# Patient Record
Sex: MALE | Race: BLACK OR AFRICAN AMERICAN | NOT HISPANIC OR LATINO | Employment: OTHER | URBAN - METROPOLITAN AREA
[De-identification: names, ages, dates, MRNs, and addresses within clinical notes are randomized per-mention and may not be internally consistent; named-entity substitution may affect disease eponyms.]

---

## 2022-01-21 ENCOUNTER — TELEPHONE (OUTPATIENT)
Dept: UROLOGY | Facility: CLINIC | Age: 67
End: 2022-01-21
Payer: COMMERCIAL

## 2022-01-21 NOTE — TELEPHONE ENCOUNTER
Called and spoke to patient. States that he was seen Dr. Arjun Gruber. States they are seeking a second opinion for a urolift consult. States that they will fax over records they have. Will also request additional records.

## 2022-01-21 NOTE — TELEPHONE ENCOUNTER
"Booked in by phone room on 2/4 for "urolift follow up"  Please call patient to clarify  Is this a consult to discuss urolift? Or has he had one elsewhere and following up now here?  Seen urology before? Get records  As well, and prior psa? Request all prior and or from prior pcp before seeing dr york for cdl    "

## 2022-02-01 NOTE — TELEPHONE ENCOUNTER
Please apologize as not avail 2/4  Will need to rebook  2/21 pvt slot ok  Can confirm we have his prior uro records

## 2022-02-02 NOTE — TELEPHONE ENCOUNTER
Called and spoke to patient. Advised that MD is not available on 2/4 and appt will need to be rescheduled. Appt scheduled 2/21 for 9:30am. Patient confirmed.

## 2022-03-10 ENCOUNTER — OFFICE VISIT (OUTPATIENT)
Dept: UROLOGY | Facility: CLINIC | Age: 67
End: 2022-03-10
Payer: COMMERCIAL

## 2022-03-10 VITALS
HEART RATE: 63 BPM | SYSTOLIC BLOOD PRESSURE: 117 MMHG | DIASTOLIC BLOOD PRESSURE: 77 MMHG | BODY MASS INDEX: 26.35 KG/M2 | WEIGHT: 184.06 LBS | HEIGHT: 70 IN

## 2022-03-10 DIAGNOSIS — R39.9 LOWER URINARY TRACT SYMPTOMS (LUTS): Primary | ICD-10-CM

## 2022-03-10 DIAGNOSIS — R35.0 URINARY FREQUENCY: ICD-10-CM

## 2022-03-10 DIAGNOSIS — N52.9 ERECTILE DYSFUNCTION, UNSPECIFIED ERECTILE DYSFUNCTION TYPE: ICD-10-CM

## 2022-03-10 LAB — POC RESIDUAL URINE VOLUME: 76 ML (ref 0–100)

## 2022-03-10 PROCEDURE — 99204 PR OFFICE/OUTPT VISIT, NEW, LEVL IV, 45-59 MIN: ICD-10-PCS | Mod: S$GLB,,, | Performed by: UROLOGY

## 2022-03-10 PROCEDURE — 51798 US URINE CAPACITY MEASURE: CPT | Mod: S$GLB,,, | Performed by: UROLOGY

## 2022-03-10 PROCEDURE — 3008F PR BODY MASS INDEX (BMI) DOCUMENTED: ICD-10-PCS | Mod: CPTII,S$GLB,, | Performed by: UROLOGY

## 2022-03-10 PROCEDURE — 1126F PR PAIN SEVERITY QUANTIFIED, NO PAIN PRESENT: ICD-10-PCS | Mod: CPTII,S$GLB,, | Performed by: UROLOGY

## 2022-03-10 PROCEDURE — 1101F PT FALLS ASSESS-DOCD LE1/YR: CPT | Mod: CPTII,S$GLB,, | Performed by: UROLOGY

## 2022-03-10 PROCEDURE — 3078F DIAST BP <80 MM HG: CPT | Mod: CPTII,S$GLB,, | Performed by: UROLOGY

## 2022-03-10 PROCEDURE — 99999 PR PBB SHADOW E&M-EST. PATIENT-LVL III: ICD-10-PCS | Mod: PBBFAC,,, | Performed by: UROLOGY

## 2022-03-10 PROCEDURE — 3008F BODY MASS INDEX DOCD: CPT | Mod: CPTII,S$GLB,, | Performed by: UROLOGY

## 2022-03-10 PROCEDURE — 1126F AMNT PAIN NOTED NONE PRSNT: CPT | Mod: CPTII,S$GLB,, | Performed by: UROLOGY

## 2022-03-10 PROCEDURE — 3078F PR MOST RECENT DIASTOLIC BLOOD PRESSURE < 80 MM HG: ICD-10-PCS | Mod: CPTII,S$GLB,, | Performed by: UROLOGY

## 2022-03-10 PROCEDURE — 1159F PR MEDICATION LIST DOCUMENTED IN MEDICAL RECORD: ICD-10-PCS | Mod: CPTII,S$GLB,, | Performed by: UROLOGY

## 2022-03-10 PROCEDURE — 3074F SYST BP LT 130 MM HG: CPT | Mod: CPTII,S$GLB,, | Performed by: UROLOGY

## 2022-03-10 PROCEDURE — 1160F PR REVIEW ALL MEDS BY PRESCRIBER/CLIN PHARMACIST DOCUMENTED: ICD-10-PCS | Mod: CPTII,S$GLB,, | Performed by: UROLOGY

## 2022-03-10 PROCEDURE — 3074F PR MOST RECENT SYSTOLIC BLOOD PRESSURE < 130 MM HG: ICD-10-PCS | Mod: CPTII,S$GLB,, | Performed by: UROLOGY

## 2022-03-10 PROCEDURE — 3288F PR FALLS RISK ASSESSMENT DOCUMENTED: ICD-10-PCS | Mod: CPTII,S$GLB,, | Performed by: UROLOGY

## 2022-03-10 PROCEDURE — 1101F PR PT FALLS ASSESS DOC 0-1 FALLS W/OUT INJ PAST YR: ICD-10-PCS | Mod: CPTII,S$GLB,, | Performed by: UROLOGY

## 2022-03-10 PROCEDURE — 99999 PR PBB SHADOW E&M-EST. PATIENT-LVL III: CPT | Mod: PBBFAC,,, | Performed by: UROLOGY

## 2022-03-10 PROCEDURE — 51798 POCT BLADDER SCAN: ICD-10-PCS | Mod: S$GLB,,, | Performed by: UROLOGY

## 2022-03-10 PROCEDURE — 3288F FALL RISK ASSESSMENT DOCD: CPT | Mod: CPTII,S$GLB,, | Performed by: UROLOGY

## 2022-03-10 PROCEDURE — 1160F RVW MEDS BY RX/DR IN RCRD: CPT | Mod: CPTII,S$GLB,, | Performed by: UROLOGY

## 2022-03-10 PROCEDURE — 99204 OFFICE O/P NEW MOD 45 MIN: CPT | Mod: S$GLB,,, | Performed by: UROLOGY

## 2022-03-10 PROCEDURE — 1159F MED LIST DOCD IN RCRD: CPT | Mod: CPTII,S$GLB,, | Performed by: UROLOGY

## 2022-03-10 RX ORDER — ACETAMINOPHEN 500 MG
1 TABLET ORAL DAILY
COMMUNITY
Start: 2022-01-01 | End: 2023-10-31

## 2022-03-10 RX ORDER — IBUPROFEN 100 MG/5ML
1 SUSPENSION, ORAL (FINAL DOSE FORM) ORAL DAILY
COMMUNITY
Start: 2022-01-01 | End: 2023-10-31

## 2022-03-10 RX ORDER — TAMSULOSIN HYDROCHLORIDE 0.4 MG/1
0.4 CAPSULE ORAL
COMMUNITY
Start: 2021-12-06 | End: 2022-06-03

## 2022-03-10 NOTE — PROGRESS NOTES
"[facility] New Patient/H&P:    Jos Espino is a 66 y.o. male who presents for lower urinary tract symptoms.    Patient with a history of HLD who has a several year history of bothersome lower urinary tract symptoms that have been progressively worsening.     He reports weak stream, frequency, incomplete emptying, intermittency, straining and nocturia x 2. He has been on Flomax for numerous years with no significant improvement. Drinks mostly water throughout the day.     He underwent attempted Urolift of an enlarged median lobe with Dr. Gruber in [place] on 1/14/22. Per the op note, he was able to place one implant in the median lobe that did not appear to be placed well. Procedure aborted. Here reporting that his symptoms have persisted and he is bothered.     UA negative on 1/20/22.     He also has moderate erectile dysfunction. He tried Viagra, but discontinued as his wife was not satisfied. Not bothered by his erectile function.     Two brothers with prostate cancer. Works as a .     Denies any fever, chills, flank pain, gross hematuria, bone pain, unintentional weight loss      PSA  1.03  12/6/21    IPSS QoL  17 4 3/10/22    PVR  76 mL  3/10/22      Past Medical History:   Diagnosis Date    Hyperlipidemia        History reviewed. No pertinent surgical history.    Family History   Problem Relation Age of Onset    Hypertension Brother     Cancer Brother         prostate       Review of patient's allergies indicates:  No Known Allergies    Medications Reviewed: see MAR      FOCUSED PHYSICAL EXAM:    Vitals:    03/10/22 0931   BP: 117/77   Pulse: 63     Body mass index is 26.41 kg/m². Weight: 83.5 kg (184 lb 1.4 oz) Height: 5' 10" (177.8 cm)       General: Alert, cooperative, no distress, appears stated age  Abdomen: Soft, non-tender, no CVA tenderness, non-distended      LABS:    Recent Results (from the past 336 hour(s))   POCT Bladder Scan    Collection Time: 03/10/22  " 9:40 AM   Result Value Ref Range    POC Residual Urine Volume 76 0 - 100 mL           Assessment/Diagnosis:    1. Lower urinary tract symptoms (LUTS)     2. Urinary frequency  POCT Bladder Scan   3. Erectile dysfunction, unspecified erectile dysfunction type         Plans:    - I spent 45 minutes of the day of this encounter preparing for, treating and managing this patient. Extensive discussion with patient regarding the etiology and management of his lower urinary tract symptoms. Explained that LUTS are multifactorial and can be secondary to an enlarged prostate, PO intake of bladder irritants, overactive bladder, constipation, malignancy, trauma, infection, stones or medications.   - Unclear based on records, if the implant is still in place. Given his symptoms, recommend further evaluation with cystoscopy and TRUS in clinic next available.   - Would likely benefit from TURP based on findings.

## 2022-03-10 NOTE — H&P (VIEW-ONLY)
"[facility] New Patient/H&P:    Jos Espino is a 66 y.o. male who presents for lower urinary tract symptoms.    Patient with a history of HLD who has a several year history of bothersome lower urinary tract symptoms that have been progressively worsening.     He reports weak stream, frequency, incomplete emptying, intermittency, straining and nocturia x 2. He has been on Flomax for numerous years with no significant improvement. Drinks mostly water throughout the day.     He underwent attempted Urolift of an enlarged median lobe with Dr. Gruber in [place] on 1/14/22. Per the op note, he was able to place one implant in the median lobe that did not appear to be placed well. Procedure aborted. Here reporting that his symptoms have persisted and he is bothered.     UA negative on 1/20/22.     He also has moderate erectile dysfunction. He tried Viagra, but discontinued as his wife was not satisfied. Not bothered by his erectile function.     Two brothers with prostate cancer. Works as a .     Denies any fever, chills, flank pain, gross hematuria, bone pain, unintentional weight loss      PSA  1.03  12/6/21    IPSS QoL  17 4 3/10/22    PVR  76 mL  3/10/22      Past Medical History:   Diagnosis Date    Hyperlipidemia        History reviewed. No pertinent surgical history.    Family History   Problem Relation Age of Onset    Hypertension Brother     Cancer Brother         prostate       Review of patient's allergies indicates:  No Known Allergies    Medications Reviewed: see MAR      FOCUSED PHYSICAL EXAM:    Vitals:    03/10/22 0931   BP: 117/77   Pulse: 63     Body mass index is 26.41 kg/m². Weight: 83.5 kg (184 lb 1.4 oz) Height: 5' 10" (177.8 cm)       General: Alert, cooperative, no distress, appears stated age  Abdomen: Soft, non-tender, no CVA tenderness, non-distended      LABS:    Recent Results (from the past 336 hour(s))   POCT Bladder Scan    Collection Time: 03/10/22  " 9:40 AM   Result Value Ref Range    POC Residual Urine Volume 76 0 - 100 mL           Assessment/Diagnosis:    1. Lower urinary tract symptoms (LUTS)     2. Urinary frequency  POCT Bladder Scan   3. Erectile dysfunction, unspecified erectile dysfunction type         Plans:    - I spent 45 minutes of the day of this encounter preparing for, treating and managing this patient. Extensive discussion with patient regarding the etiology and management of his lower urinary tract symptoms. Explained that LUTS are multifactorial and can be secondary to an enlarged prostate, PO intake of bladder irritants, overactive bladder, constipation, malignancy, trauma, infection, stones or medications.   - Unclear based on records, if the implant is still in place. Given his symptoms, recommend further evaluation with cystoscopy and TRUS in clinic next available.   - Would likely benefit from TURP based on findings.

## 2022-03-18 ENCOUNTER — PROCEDURE VISIT (OUTPATIENT)
Dept: UROLOGY | Facility: CLINIC | Age: 67
End: 2022-03-18
Payer: COMMERCIAL

## 2022-03-18 VITALS
HEART RATE: 90 BPM | WEIGHT: 184.06 LBS | DIASTOLIC BLOOD PRESSURE: 80 MMHG | HEIGHT: 70 IN | SYSTOLIC BLOOD PRESSURE: 135 MMHG | BODY MASS INDEX: 26.35 KG/M2

## 2022-03-18 DIAGNOSIS — R35.0 URINARY FREQUENCY: ICD-10-CM

## 2022-03-18 DIAGNOSIS — R39.12 BENIGN PROSTATIC HYPERPLASIA WITH WEAK URINARY STREAM: ICD-10-CM

## 2022-03-18 DIAGNOSIS — N40.1 BENIGN PROSTATIC HYPERPLASIA WITH WEAK URINARY STREAM: ICD-10-CM

## 2022-03-18 DIAGNOSIS — R39.9 LOWER URINARY TRACT SYMPTOMS (LUTS): Primary | ICD-10-CM

## 2022-03-18 PROCEDURE — 52000 PR CYSTOURETHROSCOPY: ICD-10-PCS | Mod: S$GLB,,, | Performed by: UROLOGY

## 2022-03-18 PROCEDURE — 76872 PR US TRANSRECTAL: ICD-10-PCS | Mod: 26,S$GLB,, | Performed by: UROLOGY

## 2022-03-18 PROCEDURE — 52000 CYSTOURETHROSCOPY: CPT | Mod: S$GLB,,, | Performed by: UROLOGY

## 2022-03-18 PROCEDURE — 76872 US TRANSRECTAL: CPT | Mod: 26,S$GLB,, | Performed by: UROLOGY

## 2022-03-18 PROCEDURE — 87086 URINE CULTURE/COLONY COUNT: CPT | Performed by: UROLOGY

## 2022-03-18 RX ORDER — CEPHALEXIN 500 MG/1
500 CAPSULE ORAL EVERY 8 HOURS
Qty: 9 CAPSULE | Refills: 0 | Status: SHIPPED | OUTPATIENT
Start: 2022-03-18 | End: 2022-03-21

## 2022-03-18 NOTE — PROCEDURES
[facility]  Operative/Discharge Note    Date: 03/18/2022    Pre-Op Diagnosis: BPH    Post-Op Diagnosis: Same    Procedure(s) Performed:   1.  Cystoscopy  2.  Transrectal ultrasound     Specimen(s): Urine culture    Staff Surgeon: Ricky Rousseau MD    Assistant Surgeon: Ricky Rousseau Jr, MD    Anesthesia: Local anesthesia topical 2% lidocaine gel    Indications: Jos Espino is a 66 y.o. male with BPH s/p unsuccessful Urolift.     Findings: 32 cc prostate with an enlarged median lobe and mild coaptation of the lateral lobes. Otherwise unremarkable.     Estimated Blood Loss: min    Drains: None    RYDER: 30 grams, smooth, anodular    Procedure in Detail:  After risks, benefits and possible complications of cystoscopy and TRUS were explained, the patient elected to undergo the procedure and informed consent was obtained. All questions were answered in the peggy-operative area. The patient was transferred to the cystoscopy suite and placed in the lateral position.     TRUS probe was inserted into the rectum and the prostate measurement was 32 cc.     The patient was placed in the lithotomy position and then prepped and draped in the usual sterile fashion. Lidocaine jelly was administered for local anesthesia. Time out was performed.    A flexible cystoscope was introduced into the bladder per urethra. This passed easily.  The entire urethra was visualized which showed no masses or strictures.  The prostate was 2 cm in length and appeared obstructing mostly from an enlarged median lobe. The right and left ureteral orifices were identified in the normal anatomic position and were seen effluxing clear urine.  Formal cystoscopy was performed which revealed no masses or lesions suspicious for malignancy, no trabeculations, no bladder stones and no bladder diverticuli.      The patient tolerated the procedure well and was transferred to recovery in stable condition.    Disposition: Home    Discharge home today status post  uncomplicated procedure as above  Diet - resume home diet  Follow up: Scheduled for TURP on 4/7/22. Phone pre-op prior. Could potentially go home given that it is mostly an enlarged median lobe.   Instructions: Follow up urine culture. RX for Keflex.   Meds:     Medication List          Accurate as of March 18, 2022  9:04 AM. If you have any questions, ask your nurse or doctor.            START taking these medications    cephALEXin 500 MG capsule  Commonly known as: KEFLEX  Take 1 capsule (500 mg total) by mouth every 8 (eight) hours. for 3 days  Started by: Ricky Rousseau Jr, MD        CONTINUE taking these medications    ascorbic acid (vitamin C) 1000 MG tablet  Commonly known as: VITAMIN C     atorvastatin 10 MG tablet  Commonly known as: LIPITOR     cholecalciferol (vitamin D3) 50 mcg (2,000 unit) Cap  Commonly known as: VITAMIN D3     tamsulosin 0.4 mg Cap  Commonly known as: FLOMAX           Where to Get Your Medications      These medications were sent to [place], [place] - [address] [address]    Phone: [phone]   · cephALEXin 500 MG capsule         Ricky Rousseau Jr, MD

## 2022-03-19 LAB — BACTERIA UR CULT: NO GROWTH

## 2022-03-28 ENCOUNTER — TELEPHONE (OUTPATIENT)
Dept: UROLOGY | Facility: CLINIC | Age: 67
End: 2022-03-28
Payer: MEDICARE

## 2022-03-28 NOTE — TELEPHONE ENCOUNTER
----- Message from Mauricio Ghosh sent at 3/28/2022 10:18 AM CDT -----  Regarding: advice  Contact: self  Type: Needs Medical Advice  Who Called:  wife-   Symptoms (please be specific):    How long has patient had these symptoms:    Pharmacy name and phone #:    Best Call Back Number: [phone]  Additional Information:  Patient's wife need clarification for pre-Admit appointment.

## 2022-03-28 NOTE — TELEPHONE ENCOUNTER
Spoke with SravanthiEspino and informed her the pre admit appointment is a phone call. Patients wife verbalized understanding.

## 2022-03-29 ENCOUNTER — HOSPITAL ENCOUNTER (OUTPATIENT)
Dept: PREADMISSION TESTING | Facility: HOSPITAL | Age: 67
Discharge: HOME | End: 2022-03-29
Payer: MEDICARE

## 2022-03-29 VITALS — WEIGHT: 180 LBS | BODY MASS INDEX: 25.77 KG/M2 | HEIGHT: 70 IN

## 2022-04-06 ENCOUNTER — ANESTHESIA EVENT (OUTPATIENT)
Dept: SURGERY | Facility: HOSPITAL | Age: 67
End: 2022-04-06
Payer: MEDICARE

## 2022-04-06 ENCOUNTER — TELEPHONE (OUTPATIENT)
Dept: UROLOGY | Facility: CLINIC | Age: 67
End: 2022-04-06
Payer: MEDICARE

## 2022-04-06 NOTE — TELEPHONE ENCOUNTER
Called and spoke to patient regarding procedure for tomorrow. Patient asking for arrival time for procedure. Informed that surgery will call before 6pm with arrival time. Patient voiced okay.

## 2022-04-06 NOTE — TELEPHONE ENCOUNTER
----- Message from Paola Dillard sent at 4/6/2022  1:51 PM CDT -----  Contact: Blanca at [phone]  Type: Needs Medical Advice  Who Called:  Pt wife Blanca     Best Call Back Number: [phone]    Additional Information: Pts wife is calling to get the details for his procedure. Pls call back and advise.       Pt does not use [employer] so pls call only

## 2022-04-07 ENCOUNTER — HOSPITAL ENCOUNTER (OUTPATIENT)
Facility: HOSPITAL | Age: 67
Discharge: HOME | End: 2022-04-08
Attending: UROLOGY | Admitting: UROLOGY
Payer: MEDICARE

## 2022-04-07 ENCOUNTER — ANESTHESIA (OUTPATIENT)
Dept: SURGERY | Facility: HOSPITAL | Age: 67
End: 2022-04-07
Payer: MEDICARE

## 2022-04-07 DIAGNOSIS — R39.9 LOWER URINARY TRACT SYMPTOMS (LUTS): ICD-10-CM

## 2022-04-07 DIAGNOSIS — N40.1 BENIGN PROSTATIC HYPERPLASIA WITH WEAK URINARY STREAM: ICD-10-CM

## 2022-04-07 DIAGNOSIS — R39.12 BENIGN PROSTATIC HYPERPLASIA WITH WEAK URINARY STREAM: ICD-10-CM

## 2022-04-07 DIAGNOSIS — N40.0 BPH (BENIGN PROSTATIC HYPERPLASIA): ICD-10-CM

## 2022-04-07 DIAGNOSIS — N13.8 BENIGN PROSTATIC HYPERPLASIA WITH URINARY OBSTRUCTION: Primary | ICD-10-CM

## 2022-04-07 DIAGNOSIS — N40.1 BENIGN PROSTATIC HYPERPLASIA WITH URINARY OBSTRUCTION: Primary | ICD-10-CM

## 2022-04-07 LAB
ANION GAP SERPL CALC-SCNC: 6 MMOL/L (ref 8–16)
BASOPHILS # BLD AUTO: 0.07 K/UL (ref 0–0.2)
BASOPHILS NFR BLD: 1.2 % (ref 0–1.9)
BUN SERPL-MCNC: 11 MG/DL (ref 8–23)
CALCIUM SERPL-MCNC: 9.7 MG/DL (ref 8.7–10.5)
CHLORIDE SERPL-SCNC: 106 MMOL/L (ref 95–110)
CO2 SERPL-SCNC: 28 MMOL/L (ref 23–29)
CREAT SERPL-MCNC: 1.1 MG/DL (ref 0.5–1.4)
DIFFERENTIAL METHOD: ABNORMAL
EOSINOPHIL # BLD AUTO: 0.3 K/UL (ref 0–0.5)
EOSINOPHIL NFR BLD: 4.4 % (ref 0–8)
ERYTHROCYTE [DISTWIDTH] IN BLOOD BY AUTOMATED COUNT: 12.7 % (ref 11.5–14.5)
EST. GFR  (AFRICAN AMERICAN): >60 ML/MIN/1.73 M^2
EST. GFR  (NON AFRICAN AMERICAN): >60 ML/MIN/1.73 M^2
GLUCOSE SERPL-MCNC: 99 MG/DL (ref 70–110)
HCT VFR BLD AUTO: 41.5 % (ref 40–54)
HGB BLD-MCNC: 13.8 G/DL (ref 14–18)
IMM GRANULOCYTES # BLD AUTO: 0.02 K/UL (ref 0–0.04)
IMM GRANULOCYTES NFR BLD AUTO: 0.4 % (ref 0–0.5)
LYMPHOCYTES # BLD AUTO: 2 K/UL (ref 1–4.8)
LYMPHOCYTES NFR BLD: 35.4 % (ref 18–48)
MCH RBC QN AUTO: 27.4 PG (ref 27–31)
MCHC RBC AUTO-ENTMCNC: 33.3 G/DL (ref 32–36)
MCV RBC AUTO: 82 FL (ref 82–98)
MONOCYTES # BLD AUTO: 0.6 K/UL (ref 0.3–1)
MONOCYTES NFR BLD: 11.3 % (ref 4–15)
NEUTROPHILS # BLD AUTO: 2.7 K/UL (ref 1.8–7.7)
NEUTROPHILS NFR BLD: 47.3 % (ref 38–73)
NRBC BLD-RTO: 0 /100 WBC
PLATELET # BLD AUTO: 169 K/UL (ref 150–450)
PMV BLD AUTO: 13.2 FL (ref 9.2–12.9)
POTASSIUM SERPL-SCNC: 4.2 MMOL/L (ref 3.5–5.1)
RBC # BLD AUTO: 5.04 M/UL (ref 4.6–6.2)
SODIUM SERPL-SCNC: 140 MMOL/L (ref 136–145)
WBC # BLD AUTO: 5.68 K/UL (ref 3.9–12.7)

## 2022-04-07 PROCEDURE — 88305 TISSUE EXAM BY PATHOLOGIST: CPT | Performed by: PATHOLOGY

## 2022-04-07 PROCEDURE — 63600175 PHARM REV CODE 636 W HCPCS: Performed by: NURSE ANESTHETIST, CERTIFIED REGISTERED

## 2022-04-07 PROCEDURE — 37000009 HC ANESTHESIA EA ADD 15 MINS: Performed by: UROLOGY

## 2022-04-07 PROCEDURE — D9220A PRA ANESTHESIA: ICD-10-PCS | Mod: CRNA,,, | Performed by: NURSE ANESTHETIST, CERTIFIED REGISTERED

## 2022-04-07 PROCEDURE — 94799 UNLISTED PULMONARY SVC/PX: CPT

## 2022-04-07 PROCEDURE — 37000008 HC ANESTHESIA 1ST 15 MINUTES: Performed by: UROLOGY

## 2022-04-07 PROCEDURE — 99900103 DSU ONLY-NO CHARGE-INITIAL HR (STAT): Performed by: UROLOGY

## 2022-04-07 PROCEDURE — 36415 COLL VENOUS BLD VENIPUNCTURE: CPT | Performed by: UROLOGY

## 2022-04-07 PROCEDURE — 88305 TISSUE EXAM BY PATHOLOGIST: ICD-10-PCS | Mod: 26,,, | Performed by: PATHOLOGY

## 2022-04-07 PROCEDURE — 52601 PROSTATECTOMY (TURP): CPT | Mod: ,,, | Performed by: UROLOGY

## 2022-04-07 PROCEDURE — 27201423 OPTIME MED/SURG SUP & DEVICES STERILE SUPPLY: Performed by: UROLOGY

## 2022-04-07 PROCEDURE — 25000003 PHARM REV CODE 250: Performed by: ANESTHESIOLOGY

## 2022-04-07 PROCEDURE — 36000707: Performed by: UROLOGY

## 2022-04-07 PROCEDURE — D9220A PRA ANESTHESIA: ICD-10-PCS | Mod: ANES,,, | Performed by: ANESTHESIOLOGY

## 2022-04-07 PROCEDURE — 52601 PR TRANSURETHRAL ELEC-SURG PROSTATECTOM: ICD-10-PCS | Mod: ,,, | Performed by: UROLOGY

## 2022-04-07 PROCEDURE — 71000039 HC RECOVERY, EACH ADD'L HOUR: Performed by: UROLOGY

## 2022-04-07 PROCEDURE — 85025 COMPLETE CBC W/AUTO DIFF WBC: CPT | Performed by: UROLOGY

## 2022-04-07 PROCEDURE — 99900104 DSU ONLY-NO CHARGE-EA ADD'L HR (STAT): Performed by: UROLOGY

## 2022-04-07 PROCEDURE — 71000033 HC RECOVERY, INTIAL HOUR: Performed by: UROLOGY

## 2022-04-07 PROCEDURE — 63600175 PHARM REV CODE 636 W HCPCS: Performed by: UROLOGY

## 2022-04-07 PROCEDURE — 25000003 PHARM REV CODE 250: Performed by: NURSE ANESTHETIST, CERTIFIED REGISTERED

## 2022-04-07 PROCEDURE — 27200651 HC AIRWAY, LMA: Performed by: ANESTHESIOLOGY

## 2022-04-07 PROCEDURE — 36000706: Performed by: UROLOGY

## 2022-04-07 PROCEDURE — 88305 TISSUE EXAM BY PATHOLOGIST: CPT | Mod: 26,,, | Performed by: PATHOLOGY

## 2022-04-07 PROCEDURE — 80048 BASIC METABOLIC PNL TOTAL CA: CPT | Performed by: UROLOGY

## 2022-04-07 PROCEDURE — D9220A PRA ANESTHESIA: Mod: ANES,,, | Performed by: ANESTHESIOLOGY

## 2022-04-07 PROCEDURE — D9220A PRA ANESTHESIA: Mod: CRNA,,, | Performed by: NURSE ANESTHETIST, CERTIFIED REGISTERED

## 2022-04-07 PROCEDURE — 94761 N-INVAS EAR/PLS OXIMETRY MLT: CPT

## 2022-04-07 PROCEDURE — 99900035 HC TECH TIME PER 15 MIN (STAT)

## 2022-04-07 RX ORDER — PHENYLEPHRINE HYDROCHLORIDE 10 MG/ML
INJECTION INTRAVENOUS
Status: DISCONTINUED | OUTPATIENT
Start: 2022-04-07 | End: 2022-04-07

## 2022-04-07 RX ORDER — CEFAZOLIN SODIUM 2 G/50ML
2 SOLUTION INTRAVENOUS
Status: COMPLETED | OUTPATIENT
Start: 2022-04-07 | End: 2022-04-07

## 2022-04-07 RX ORDER — METOCLOPRAMIDE HYDROCHLORIDE 5 MG/ML
10 INJECTION INTRAMUSCULAR; INTRAVENOUS EVERY 10 MIN PRN
Status: DISCONTINUED | OUTPATIENT
Start: 2022-04-07 | End: 2022-04-07 | Stop reason: HOSPADM

## 2022-04-07 RX ORDER — OXYCODONE HYDROCHLORIDE 5 MG/1
5 TABLET ORAL
Status: DISCONTINUED | OUTPATIENT
Start: 2022-04-07 | End: 2022-04-07 | Stop reason: HOSPADM

## 2022-04-07 RX ORDER — SODIUM CHLORIDE 0.9 % (FLUSH) 0.9 %
10 SYRINGE (ML) INJECTION
Status: DISCONTINUED | OUTPATIENT
Start: 2022-04-07 | End: 2022-04-08 | Stop reason: HOSPADM

## 2022-04-07 RX ORDER — ACETAMINOPHEN 10 MG/ML
INJECTION, SOLUTION INTRAVENOUS
Status: DISCONTINUED | OUTPATIENT
Start: 2022-04-07 | End: 2022-04-07

## 2022-04-07 RX ORDER — ACETAMINOPHEN 325 MG/1
650 TABLET ORAL EVERY 4 HOURS PRN
Status: DISCONTINUED | OUTPATIENT
Start: 2022-04-07 | End: 2022-04-08 | Stop reason: HOSPADM

## 2022-04-07 RX ORDER — ATORVASTATIN CALCIUM 10 MG/1
10 TABLET, FILM COATED ORAL DAILY
Status: DISCONTINUED | OUTPATIENT
Start: 2022-04-08 | End: 2022-04-08 | Stop reason: HOSPADM

## 2022-04-07 RX ORDER — SODIUM CHLORIDE, SODIUM LACTATE, POTASSIUM CHLORIDE, CALCIUM CHLORIDE 600; 310; 30; 20 MG/100ML; MG/100ML; MG/100ML; MG/100ML
INJECTION, SOLUTION INTRAVENOUS CONTINUOUS
Status: DISCONTINUED | OUTPATIENT
Start: 2022-04-07 | End: 2022-04-08

## 2022-04-07 RX ORDER — LIDOCAINE HYDROCHLORIDE 10 MG/ML
1 INJECTION, SOLUTION EPIDURAL; INFILTRATION; INTRACAUDAL; PERINEURAL ONCE
Status: DISCONTINUED | OUTPATIENT
Start: 2022-04-07 | End: 2022-04-07 | Stop reason: HOSPADM

## 2022-04-07 RX ORDER — FENTANYL CITRATE 50 UG/ML
25 INJECTION, SOLUTION INTRAMUSCULAR; INTRAVENOUS EVERY 5 MIN PRN
Status: DISCONTINUED | OUTPATIENT
Start: 2022-04-07 | End: 2022-04-07 | Stop reason: HOSPADM

## 2022-04-07 RX ORDER — HYDROCODONE BITARTRATE AND ACETAMINOPHEN 5; 325 MG/1; MG/1
1 TABLET ORAL EVERY 4 HOURS PRN
Status: DISCONTINUED | OUTPATIENT
Start: 2022-04-07 | End: 2022-04-08 | Stop reason: HOSPADM

## 2022-04-07 RX ORDER — CEFAZOLIN SODIUM 2 G/50ML
2 SOLUTION INTRAVENOUS
Status: DISCONTINUED | OUTPATIENT
Start: 2022-04-07 | End: 2022-04-08 | Stop reason: HOSPADM

## 2022-04-07 RX ORDER — ATROPA BELLADONNA AND OPIUM 16.2; 3 MG/1; MG/1
30 SUPPOSITORY RECTAL DAILY PRN
Status: DISCONTINUED | OUTPATIENT
Start: 2022-04-07 | End: 2022-04-08 | Stop reason: HOSPADM

## 2022-04-07 RX ORDER — ONDANSETRON HYDROCHLORIDE 2 MG/ML
INJECTION, SOLUTION INTRAMUSCULAR; INTRAVENOUS
Status: DISCONTINUED | OUTPATIENT
Start: 2022-04-07 | End: 2022-04-07

## 2022-04-07 RX ORDER — DEXAMETHASONE SODIUM PHOSPHATE 4 MG/ML
INJECTION, SOLUTION INTRA-ARTICULAR; INTRALESIONAL; INTRAMUSCULAR; INTRAVENOUS; SOFT TISSUE
Status: DISCONTINUED | OUTPATIENT
Start: 2022-04-07 | End: 2022-04-07

## 2022-04-07 RX ORDER — TAMSULOSIN HYDROCHLORIDE 0.4 MG/1
0.4 CAPSULE ORAL DAILY
Status: DISCONTINUED | OUTPATIENT
Start: 2022-04-08 | End: 2022-04-08 | Stop reason: HOSPADM

## 2022-04-07 RX ORDER — MIDAZOLAM HYDROCHLORIDE 1 MG/ML
INJECTION INTRAMUSCULAR; INTRAVENOUS
Status: DISCONTINUED | OUTPATIENT
Start: 2022-04-07 | End: 2022-04-07

## 2022-04-07 RX ORDER — LIDOCAINE HCL/PF 100 MG/5ML
SYRINGE (ML) INTRAVENOUS
Status: DISCONTINUED | OUTPATIENT
Start: 2022-04-07 | End: 2022-04-07

## 2022-04-07 RX ORDER — FENTANYL CITRATE 50 UG/ML
INJECTION, SOLUTION INTRAMUSCULAR; INTRAVENOUS
Status: DISCONTINUED | OUTPATIENT
Start: 2022-04-07 | End: 2022-04-07

## 2022-04-07 RX ORDER — PROPOFOL 10 MG/ML
VIAL (ML) INTRAVENOUS
Status: DISCONTINUED | OUTPATIENT
Start: 2022-04-07 | End: 2022-04-07

## 2022-04-07 RX ORDER — ONDANSETRON 2 MG/ML
4 INJECTION INTRAMUSCULAR; INTRAVENOUS EVERY 12 HOURS PRN
Status: DISCONTINUED | OUTPATIENT
Start: 2022-04-07 | End: 2022-04-08 | Stop reason: HOSPADM

## 2022-04-07 RX ADMIN — LIDOCAINE HYDROCHLORIDE 75 MG: 20 INJECTION INTRAVENOUS at 12:04

## 2022-04-07 RX ADMIN — MIDAZOLAM HYDROCHLORIDE 2 MG: 1 INJECTION, SOLUTION INTRAMUSCULAR; INTRAVENOUS at 12:04

## 2022-04-07 RX ADMIN — FENTANYL CITRATE 50 MCG: 50 INJECTION, SOLUTION INTRAMUSCULAR; INTRAVENOUS at 12:04

## 2022-04-07 RX ADMIN — SODIUM CHLORIDE, SODIUM GLUCONATE, SODIUM ACETATE, POTASSIUM CHLORIDE, MAGNESIUM CHLORIDE, SODIUM PHOSPHATE, DIBASIC, AND POTASSIUM PHOSPHATE: .53; .5; .37; .037; .03; .012; .00082 INJECTION, SOLUTION INTRAVENOUS at 11:04

## 2022-04-07 RX ADMIN — ONDANSETRON 4 MG: 2 INJECTION, SOLUTION INTRAMUSCULAR; INTRAVENOUS at 12:04

## 2022-04-07 RX ADMIN — CEFAZOLIN SODIUM 2 G: 2 SOLUTION INTRAVENOUS at 09:04

## 2022-04-07 RX ADMIN — SODIUM CHLORIDE, SODIUM LACTATE, POTASSIUM CHLORIDE, AND CALCIUM CHLORIDE: .6; .31; .03; .02 INJECTION, SOLUTION INTRAVENOUS at 03:04

## 2022-04-07 RX ADMIN — DEXAMETHASONE SODIUM PHOSPHATE 4 MG: 4 INJECTION, SOLUTION INTRA-ARTICULAR; INTRALESIONAL; INTRAMUSCULAR; INTRAVENOUS; SOFT TISSUE at 12:04

## 2022-04-07 RX ADMIN — CEFAZOLIN SODIUM 2 G: 2 SOLUTION INTRAVENOUS at 12:04

## 2022-04-07 RX ADMIN — GLYCOPYRROLATE 0.2 MG: 0.2 INJECTION, SOLUTION INTRAMUSCULAR; INTRAVITREAL at 12:04

## 2022-04-07 RX ADMIN — ACETAMINOPHEN 1000 MG: 10 INJECTION, SOLUTION INTRAVENOUS at 12:04

## 2022-04-07 RX ADMIN — PROPOFOL 120 MG: 10 INJECTION, EMULSION INTRAVENOUS at 12:04

## 2022-04-07 RX ADMIN — PHENYLEPHRINE HYDROCHLORIDE 100 MCG: 10 INJECTION INTRAVENOUS at 12:04

## 2022-04-07 NOTE — NURSING
Patient arrived from PACU, CBI infusing, clear to very light pink tinge urine noted in magdaleno bag. No complaints of pain at this time. Patient AAx4, VSS.

## 2022-04-07 NOTE — ASSESSMENT & PLAN NOTE
POD 0 s/p TURP with Dr. Rousseau  Continue to follow urology recommendations.  Needs aggressive incentive spirometry.  Follow hemoglobin and hematocrit closely.  Pain control with narcotics and antiemetics as needed.  SCDs for DVT prophylaxis

## 2022-04-07 NOTE — H&P
[facility] Medicine  History & Physical    Patient Name: Jos Espino  MRN: [id number]  Patient Class: OP- Outpatient Recovery  Admission Date: 4/7/2022  Attending Physician: Dr. Shanta Cummings MD  Primary Care Provider: Primary Doctor No         Patient information was obtained from patient and past medical records.     Subjective:     Principal Problem:Enlarged prostate with lower urinary tract symptoms (LUTS)    Chief Complaint: urinary frequency       HPI: Jos Espino is a 66 yr old male with PMHx significant for hyperlipidemia and LUTS presenting today s/p TURP with Dr. Rousseau. Pt reports he has been experiencing weak stream, frequency, incomplete emptying, straining and nocturia for the past several years.  He was taking Flomax without improvement.  He underwent an attempted urolift with Dr. Gruber in [place] 01/14/2022 and procedure was aborted due to unable to place implant in the median lobe.  Patient reports his symptopms persisted post attempted urolift and he was evaluated by Dr. Rousseau in his office and opted for surgical intervention to assess LUTS and recent UroLift procedure.  Patient seen postoperatively and doing well.  He is slightly drowsy from anesthesia but arouses easily and responds appropriately.  He denies any acute pain or complaints at this time.  He denies chest pain, shortness breast, nausea or vomiting.  Patient will be observed by Hospital Medicine overnight for any postoperative complications.          Past Medical History:   Diagnosis Date    Hyperlipidemia        History reviewed. No pertinent surgical history.    Review of patient's allergies indicates:  No Known Allergies    No current facility-administered medications on file prior to encounter.     Current Outpatient Medications on File Prior to Encounter   Medication Sig    ascorbic acid, vitamin C, (VITAMIN C) 1000 MG tablet Take 1 tablet by mouth once daily.    atorvastatin (LIPITOR) 10 MG  tablet Take 10 mg by mouth once daily.    cholecalciferol, vitamin D3, (VITAMIN D3) 50 mcg (2,000 unit) Cap Take 1 capsule by mouth once daily.    tamsulosin (FLOMAX) 0.4 mg Cap Take 0.4 mg by mouth.     Family History       Problem Relation (Age of Onset)    Cancer Brother    Hypertension Brother          Tobacco Use    Smoking status: Never Smoker    Smokeless tobacco: Never Used   Substance and Sexual Activity    Alcohol use: Yes     Comment: seldom    Drug use: No    Sexual activity: Yes     Partners: Female     Birth control/protection: None     Review of Systems   Constitutional:  Negative for chills, fatigue and fever.   HENT:  Negative for congestion, sore throat and trouble swallowing.    Respiratory:  Negative for cough and shortness of breath.    Cardiovascular:  Negative for chest pain.   Gastrointestinal:  Negative for abdominal pain, diarrhea, nausea and vomiting.   Musculoskeletal:  Negative for arthralgias, back pain and gait problem.   Skin:  Negative for rash.   Neurological:  Negative for dizziness, facial asymmetry and headaches.   Hematological:  Negative for adenopathy.   Psychiatric/Behavioral:  Negative for agitation, behavioral problems and confusion.    All other systems reviewed and are negative.  Objective:     Vital Signs (Most Recent):  Temp: 97.5 °F (36.4 °C) (04/07/22 1330)  Pulse: (!) 58 (04/07/22 1330)  Resp: 12 (04/07/22 1330)  BP: (!) 141/81 (04/07/22 1330)  SpO2: 100 % (04/07/22 1330)   Vital Signs (24h Range):  Temp:  [97.2 °F (36.2 °C)-97.9 °F (36.6 °C)] 97.5 °F (36.4 °C)  Pulse:  [58-62] 58  Resp:  [12-16] 12  SpO2:  [97 %-100 %] 100 %  BP: (141-157)/(81-91) 141/81     Weight: 79.4 kg (175 lb)  Body mass index is 25.11 kg/m².    Physical Exam  Vitals and nursing note reviewed.   Constitutional:       General: He is not in acute distress.     Appearance: Normal appearance. He is well-developed. He is not ill-appearing or diaphoretic.      Comments: Drowsy but arouses  easily   HENT:      Head: Normocephalic and atraumatic.      Right Ear: External ear normal.      Left Ear: External ear normal.      Nose: Nose normal. No congestion or rhinorrhea.      Mouth/Throat:      Mouth: Mucous membranes are moist.      Pharynx: Oropharynx is clear. No oropharyngeal exudate or posterior oropharyngeal erythema.   Eyes:      General: No scleral icterus.     Conjunctiva/sclera: Conjunctivae normal.      Pupils: Pupils are equal, round, and reactive to light.   Neck:      Vascular: No JVD.   Cardiovascular:      Rate and Rhythm: Normal rate and regular rhythm.      Pulses: Normal pulses.      Heart sounds: Normal heart sounds. No murmur heard.  Pulmonary:      Effort: Pulmonary effort is normal. No respiratory distress.      Breath sounds: Normal breath sounds. No stridor. No wheezing, rhonchi or rales.   Abdominal:      General: Bowel sounds are normal. There is no distension.      Palpations: Abdomen is soft.      Tenderness: There is no abdominal tenderness.   Genitourinary:     Comments: Garcia intact with CBI in use, clear urine with slight pink tinge color  Musculoskeletal:         General: No swelling or tenderness. Normal range of motion.      Cervical back: Normal range of motion and neck supple.   Skin:     General: Skin is warm and dry.      Capillary Refill: Capillary refill takes 2 to 3 seconds.      Coloration: Skin is not jaundiced or pale.      Findings: No erythema.   Neurological:      General: No focal deficit present.      Mental Status: He is alert and oriented to person, place, and time.      Cranial Nerves: No cranial nerve deficit.      Sensory: No sensory deficit.   Psychiatric:         Mood and Affect: Mood normal.         Behavior: Behavior normal.         Thought Content: Thought content normal.         CRANIAL NERVES     CN III, IV, VI   Pupils are equal, round, and reactive to light.     Significant Labs: All pertinent labs within the past 24 hours have been  reviewed.  CBC:   Recent Labs   Lab 04/07/22  1101   WBC 5.68   HGB 13.8*   HCT 41.5        CMP:   Recent Labs   Lab 04/07/22  1101      K 4.2      CO2 28   GLU 99   BUN 11   CREATININE 1.1   CALCIUM 9.7   ANIONGAP 6*   EGFRNONAA >60       Significant Imaging: I have reviewed all pertinent imaging results/findings within the past 24 hours.    Assessment/Plan:     * Enlarged prostate with lower urinary tract symptoms (LUTS)  POD 0 s/p TURP with Dr. Rousseau  Continue to follow urology recommendations.  Needs aggressive incentive spirometry.  Follow hemoglobin and hematocrit closely.  Pain control with narcotics and antiemetics as needed.  SCDs for DVT prophylaxis      Hypercholesteremia  Chronic, stable  Cont statin        VTE Risk Mitigation (From admission, onward)         Ordered     Place sequential compression device  Until discontinued         04/07/22 1042                   Ana Azul NP  Department of Hospital Medicine   [facility]

## 2022-04-07 NOTE — ANESTHESIA POSTPROCEDURE EVALUATION
Anesthesia Post Evaluation    Patient: Jos Espino    Procedure(s) Performed: Procedure(s) (LRB):  TURP (TRANSURETHRAL RESECTION OF PROSTATE) (N/A)    Final Anesthesia Type: general      Patient location during evaluation: PACU  Patient participation: Yes- Able to Participate  Level of consciousness: awake and alert  Post-procedure vital signs: reviewed and stable  Pain management: adequate  Airway patency: patent    PONV status at discharge: No PONV  Anesthetic complications: no      Cardiovascular status: hemodynamically stable  Respiratory status: unassisted and room air  Hydration status: euvolemic  Follow-up not needed.          Vitals Value Taken Time   /86 04/07/22 1412   Temp 36.4 °C (97.5 °F) 04/07/22 1330   Pulse 60 04/07/22 1413   Resp 15 04/07/22 1413   SpO2 100 % 04/07/22 1413   Vitals shown include unvalidated device data.      No case tracking events are documented in the log.      Pain/Natasha Score: Natasha Score: 8 (4/7/2022  1:45 PM)

## 2022-04-07 NOTE — PLAN OF CARE
Released from Pacu when criteria met pain controlled skin w+d No nausea No emesis dsg dry intact aaox4 encouraged deep breaths Pt has all belongings  With him glasses on face cont bladder irrigation going urine clr no bleeding noted or clots slowed to med rate at this time reviewed IS encouraged use offered pain pill pt declined no pain at this time

## 2022-04-07 NOTE — PLAN OF CARE
Pre op assessment in progress  Pt calm,  Pt gives permission to speak with his wife in regards to his surgery, discharge and any medical history

## 2022-04-07 NOTE — SUBJECTIVE & OBJECTIVE
Past Medical History:   Diagnosis Date    Hyperlipidemia        History reviewed. No pertinent surgical history.    Review of patient's allergies indicates:  No Known Allergies    No current facility-administered medications on file prior to encounter.     Current Outpatient Medications on File Prior to Encounter   Medication Sig    ascorbic acid, vitamin C, (VITAMIN C) 1000 MG tablet Take 1 tablet by mouth once daily.    atorvastatin (LIPITOR) 10 MG tablet Take 10 mg by mouth once daily.    cholecalciferol, vitamin D3, (VITAMIN D3) 50 mcg (2,000 unit) Cap Take 1 capsule by mouth once daily.    tamsulosin (FLOMAX) 0.4 mg Cap Take 0.4 mg by mouth.     Family History       Problem Relation (Age of Onset)    Cancer Brother    Hypertension Brother          Tobacco Use    Smoking status: Never Smoker    Smokeless tobacco: Never Used   Substance and Sexual Activity    Alcohol use: Yes     Comment: seldom    Drug use: No    Sexual activity: Yes     Partners: Female     Birth control/protection: None     Review of Systems   Constitutional:  Negative for chills, fatigue and fever.   HENT:  Negative for congestion, sore throat and trouble swallowing.    Respiratory:  Negative for cough and shortness of breath.    Cardiovascular:  Negative for chest pain.   Gastrointestinal:  Negative for abdominal pain, diarrhea, nausea and vomiting.   Musculoskeletal:  Negative for arthralgias, back pain and gait problem.   Skin:  Negative for rash.   Neurological:  Negative for dizziness, facial asymmetry and headaches.   Hematological:  Negative for adenopathy.   Psychiatric/Behavioral:  Negative for agitation, behavioral problems and confusion.    All other systems reviewed and are negative.  Objective:     Vital Signs (Most Recent):  Temp: 97.5 °F (36.4 °C) (04/07/22 1330)  Pulse: (!) 58 (04/07/22 1330)  Resp: 12 (04/07/22 1330)  BP: (!) 141/81 (04/07/22 1330)  SpO2: 100 % (04/07/22 1330)   Vital Signs (24h Range):  Temp:  [97.2 °F (36.2  °C)-97.9 °F (36.6 °C)] 97.5 °F (36.4 °C)  Pulse:  [58-62] 58  Resp:  [12-16] 12  SpO2:  [97 %-100 %] 100 %  BP: (141-157)/(81-91) 141/81     Weight: 79.4 kg (175 lb)  Body mass index is 25.11 kg/m².    Physical Exam  Vitals and nursing note reviewed.   Constitutional:       General: He is not in acute distress.     Appearance: Normal appearance. He is well-developed. He is not ill-appearing or diaphoretic.      Comments: Drowsy but arouses easily   HENT:      Head: Normocephalic and atraumatic.      Right Ear: External ear normal.      Left Ear: External ear normal.      Nose: Nose normal. No congestion or rhinorrhea.      Mouth/Throat:      Mouth: Mucous membranes are moist.      Pharynx: Oropharynx is clear. No oropharyngeal exudate or posterior oropharyngeal erythema.   Eyes:      General: No scleral icterus.     Conjunctiva/sclera: Conjunctivae normal.      Pupils: Pupils are equal, round, and reactive to light.   Neck:      Vascular: No JVD.   Cardiovascular:      Rate and Rhythm: Normal rate and regular rhythm.      Pulses: Normal pulses.      Heart sounds: Normal heart sounds. No murmur heard.  Pulmonary:      Effort: Pulmonary effort is normal. No respiratory distress.      Breath sounds: Normal breath sounds. No stridor. No wheezing, rhonchi or rales.   Abdominal:      General: Bowel sounds are normal. There is no distension.      Palpations: Abdomen is soft.      Tenderness: There is no abdominal tenderness.   Genitourinary:     Comments: Garcia intact with CBI in use, clear urine with slight pink tinge color  Musculoskeletal:         General: No swelling or tenderness. Normal range of motion.      Cervical back: Normal range of motion and neck supple.   Skin:     General: Skin is warm and dry.      Capillary Refill: Capillary refill takes 2 to 3 seconds.      Coloration: Skin is not jaundiced or pale.      Findings: No erythema.   Neurological:      General: No focal deficit present.      Mental Status: He  is alert and oriented to person, place, and time.      Cranial Nerves: No cranial nerve deficit.      Sensory: No sensory deficit.   Psychiatric:         Mood and Affect: Mood normal.         Behavior: Behavior normal.         Thought Content: Thought content normal.         CRANIAL NERVES     CN III, IV, VI   Pupils are equal, round, and reactive to light.     Significant Labs: All pertinent labs within the past 24 hours have been reviewed.  CBC:   Recent Labs   Lab 04/07/22  1101   WBC 5.68   HGB 13.8*   HCT 41.5        CMP:   Recent Labs   Lab 04/07/22  1101      K 4.2      CO2 28   GLU 99   BUN 11   CREATININE 1.1   CALCIUM 9.7   ANIONGAP 6*   EGFRNONAA >60       Significant Imaging: I have reviewed all pertinent imaging results/findings within the past 24 hours.

## 2022-04-07 NOTE — TRANSFER OF CARE
"Anesthesia Transfer of Care Note    Patient: Jos Espino    Procedure(s) Performed: Procedure(s) (LRB):  TURP (TRANSURETHRAL RESECTION OF PROSTATE) (N/A)    Patient location: PACU    Anesthesia Type: general    Transport from OR: Transported from OR on 2-3 L/min O2 by NC with adequate spontaneous ventilation    Post pain: adequate analgesia    Post assessment: no apparent anesthetic complications and tolerated procedure well    Post vital signs: stable    Level of consciousness: responds to stimulation and sedated    Nausea/Vomiting: no nausea/vomiting    Complications: none    Transfer of care protocol was followed      Last vitals:   Visit Vitals  BP (!) 157/91 (BP Location: Left arm, Patient Position: Lying)   Pulse 60   Temp 36.2 °C (97.2 °F) (Skin)   Resp 16   Ht 5' 10" (1.778 m)   Wt 79.4 kg (175 lb)   SpO2 100%   BMI 25.11 kg/m²     "

## 2022-04-07 NOTE — OP NOTE
[facility]  Operative Note    Date: 04/07/2022    Pre-Op Diagnosis: BPH with bladder outlet obstruction    Post-Op Diagnosis: same    Procedure(s) Performed:   1. TURP, Bipolar    Specimen(s): Prostate chips    Staff Surgeon: Ricky Rousseau Jr, MD    Anesthesia: General endotracheal anesthesia    Indications: Jos Espino is a 66 y.o. male with BPH s/p Urolift with no improvement.     Findings: Successful TURP. Patient's verumontanum not readily visible and prostate anatomy aberrant likely due to Urolift.   UO's in normal anatomic position effluxing clear urine.     Estimated Blood Loss: Minimal    Drains: 20 Fr 3-way magdaleno catheter    Procedure in detail: After the risks and benefits of the procedure were explained, consent was obtained, and the patient was taken to the operating suite and placed in the supine position.  General anesthesia was administered.  When adequately sedated, the patient was placed in dorsal lithotomy position and prepped and draped in normal sterile fashion.  Timeout was performed and preoperative ABx were confirmed.       Urethral dilation performed using urethral sounds sequentially from 22fr to 28fr from as the meatus was open but not large enough to allow passage of the 26 fr scope.      A 26-Romansh sheath resectoscope was placed into the bladder using a visual obturator. The visual obturator was exchanged for the resecting mechanism. The ureteral orifices were identified. The median lobe was first resected with care to avoid the ureteral orifices. Once the median lobe was resected, I then turned my attention to the left lateral lobe of the prostate.The left lateral lobe was then resected in a stepwise fashion from 7 o'clock to 12 o'clock with care to leave the verumontanum and sphincter intact. Once this was performed I then directed my attention to the right lobe of the prostate and again the lateral lobe was resected from the 5 o'clock to the 12 o'clock position.  Hemostasis was then achieved.     The ureteral orifices, verumontanum and sphincter were intact. The [employer] evacuator was used to remove all prostate chips and again hemostasis was obtained using the button.      Once the bladder was drained, I could see that he had an open channel and the scope was removed.  A 20 Fr 3-way magdaleno catheter was placed in the bladder with 30 mL of water in the balloon. I then irrigated with normal saline to clear. This irrigated easily and quickly. The patient was placed on traction and on CBI. The patient was transferred to recovery in stable condition.       Disposition:   To floor with hospital medicine for overnight observation on CBI.    Ricky Rousseau Jr, MD

## 2022-04-07 NOTE — ANESTHESIA PROCEDURE NOTES
Intubation    Date/Time: 4/7/2022 12:42 PM  Performed by: Bashir Corcoran CRNA  Authorized by: Drew Olmos MD     Intubation:     Induction:  Intravenous    Intubated:  Postinduction    Mask Ventilation:  Easy mask    Attempts:  1    Attempted By:  CRNA    Difficult Airway Encountered?: No      Complications:  None    Airway Device:  Supraglottic airway/LMA    Airway Device Size:  4.0    Style/Cuff Inflation:  Cuffed (inflated to minimal occlusive pressure)    Secured at:  The lips    Placement Verified By:  Capnometry    Complicating Factors:  None

## 2022-04-07 NOTE — HPI
Jos Espino is a 66 yr old male with PMHx significant for hyperlipidemia and LUTS presenting today s/p TURP with Dr. Rousseau. Pt reports he has been experiencing weak stream, frequency, incomplete emptying, straining and nocturia for the past several years.  He was taking Flomax without improvement.  He underwent an attempted urolift with Dr. Gruber in [place] 01/14/2022 and procedure was aborted due to unable to place implant in the median lobe.  Patient reports his symptopms persisted post attempted urolift and he was evaluated by Dr. Rousseau in his office and opted for surgical intervention to assess LUTS and recent UroLift procedure.  Patient seen postoperatively and doing well.  He is slightly drowsy from anesthesia but arouses easily and responds appropriately.  He denies any acute pain or complaints at this time.  He denies chest pain, shortness breast, nausea or vomiting.  Patient will be observed by Hospital Medicine overnight for any postoperative complications.

## 2022-04-07 NOTE — ANESTHESIA PREPROCEDURE EVALUATION
04/07/2022  Jos Espino is a 66 y.o., male.      Pre-op Assessment    I have reviewed the Patient Summary Reports.     I have reviewed the Nursing Notes. I have reviewed the NPO Status.   I have reviewed the Medications.     Review of Systems  Social:  Non-Smoker    Hematology/Oncology:  Hematology Normal   Oncology Normal     EENT/Dental:EENT/Dental Normal   Cardiovascular:   hyperlipidemia    Pulmonary:  Pulmonary Normal    Renal/:   BPH    Musculoskeletal:  Musculoskeletal Normal    Neurological:  Neurology Normal    Endocrine:  Endocrine Normal    Dermatological:  Skin Normal    Psych:  Psychiatric Normal           Physical Exam  General: Well nourished, Cooperative, Alert and Oriented    Airway:  Mallampati: II   Mouth Opening: Normal  TM Distance: Normal  Tongue: Normal  Neck ROM: Normal ROM    Dental:  Intact    Chest/Lungs:  Normal Respiratory Rate, Clear to auscultation    Heart:  Rate: Normal  Rhythm: Regular Rhythm        Anesthesia Plan  Type of Anesthesia, risks & benefits discussed:    Anesthesia Type: Gen Supraglottic Airway  Intra-op Monitoring Plan: Standard ASA Monitors  Post Op Pain Control Plan: multimodal analgesia and IV/PO Opioids PRN  Induction:  IV  Airway Plan: Direct  Informed Consent: Informed consent signed with the Patient and all parties understand the risks and agree with anesthesia plan.  All questions answered.   ASA Score: 2  Day of Surgery Review of History & Physical: H&P Update referred to the surgeon/provider.    Ready For Surgery From Anesthesia Perspective.     .

## 2022-04-08 VITALS
HEART RATE: 54 BPM | OXYGEN SATURATION: 100 % | DIASTOLIC BLOOD PRESSURE: 77 MMHG | RESPIRATION RATE: 18 BRPM | SYSTOLIC BLOOD PRESSURE: 146 MMHG | TEMPERATURE: 98 F | HEIGHT: 70 IN | WEIGHT: 175 LBS | BODY MASS INDEX: 25.05 KG/M2

## 2022-04-08 PROCEDURE — 25000003 PHARM REV CODE 250: Performed by: UROLOGY

## 2022-04-08 PROCEDURE — 63600175 PHARM REV CODE 636 W HCPCS: Performed by: UROLOGY

## 2022-04-08 PROCEDURE — 94761 N-INVAS EAR/PLS OXIMETRY MLT: CPT

## 2022-04-08 PROCEDURE — 94799 UNLISTED PULMONARY SVC/PX: CPT

## 2022-04-08 PROCEDURE — 25000003 PHARM REV CODE 250: Performed by: NURSE PRACTITIONER

## 2022-04-08 RX ORDER — CEPHALEXIN 500 MG/1
500 CAPSULE ORAL EVERY 8 HOURS
Qty: 9 CAPSULE | Refills: 0 | Status: SHIPPED | OUTPATIENT
Start: 2022-04-08 | End: 2022-04-11

## 2022-04-08 RX ADMIN — HYDROCODONE BITARTRATE AND ACETAMINOPHEN 1 TABLET: 5; 325 TABLET ORAL at 06:04

## 2022-04-08 RX ADMIN — ATORVASTATIN CALCIUM 10 MG: 10 TABLET, FILM COATED ORAL at 08:04

## 2022-04-08 RX ADMIN — TAMSULOSIN HYDROCHLORIDE 0.4 MG: 0.4 CAPSULE ORAL at 08:04

## 2022-04-08 RX ADMIN — CEFAZOLIN SODIUM 2 G: 2 SOLUTION INTRAVENOUS at 05:04

## 2022-04-08 NOTE — CARE UPDATE
04/08/22 0810   Patient Assessment/Suction   Level of Consciousness (AVPU) alert   PRE-TX-O2   O2 Device (Oxygen Therapy) room air   SpO2 100 %   Pulse Oximetry Type Intermittent   $ Pulse Oximetry - Multiple Charge Pulse Oximetry - Multiple   Incentive Spirometer   $ Incentive Spirometer Charges done with encouragement   Administration (IS) mouthpiece utilized   Number of Repetitions (IS) 10   Level Incentive Spirometer (mL) 4000   Patient Tolerance (IS) good

## 2022-04-08 NOTE — PLAN OF CARE
POC discussed with patient, verbalized understanding. Patient with uneventful night, slept off and on between care. VS stable. CBI in progress with slow drip, urine remain clear, occasionally slightly pink, no clots noted. No complaints of pain voiced. IVF infusing, ABX received. IS @ bedside, encouraged use. SCD in place.

## 2022-04-08 NOTE — CONSULTS
[facility]  Telemedicine Consult Note      Thank you for your consult to [facility]. We have reviewed the patient chart. This patient does meet criteria for [facility] service at this time. Will assume care on 04/08/22 at 7AM.      Uvaldo Jung MD  Department of Hospital Medicine   [facility]

## 2022-04-08 NOTE — PROGRESS NOTES
Patient POD 1 s/p uncomplicated TURP.     No acute events overnight.     Urine clear off CBI this AM.     Plan  - Ok to remove magdaleno this AM.   - Scan bladder after he voids. Ok to discharge without catheter if PVR less than 150 mL.   - Home with Keflex TID x 3 days.   - RTC in 6 weeks with symptom score and PVR.

## 2022-04-14 LAB
FINAL PATHOLOGIC DIAGNOSIS: NORMAL
GROSS: NORMAL
Lab: NORMAL

## 2022-04-22 NOTE — DISCHARGE SUMMARY
[facility]  Discharge Summary      Patient Name: Jos Espino  MRN: [id number]  Patient Class: OP- Outpatient Recovery  Admission Date: 4/7/2022  Hospital Length of Stay: 0 days  Discharge Date and Time: 4/8/22  Attending Physician: No att. providers found   Discharging Provider: Uvaldo Jung MD  Primary Care Provider: Primary Doctor No      HPI:   Jos Espino is a 66 yr old male with PMHx significant for hyperlipidemia and LUTS presenting today s/p TURP with Dr. Rousseau. Pt reports he has been experiencing weak stream, frequency, incomplete emptying, straining and nocturia for the past several years.  He was taking Flomax without improvement.  He underwent an attempted urolift with Dr. Gruber in [place] 01/14/2022 and procedure was aborted due to unable to place implant in the median lobe.  Patient reports his symptopms persisted post attempted urolift and he was evaluated by Dr. Rousseau in his office and opted for surgical intervention to assess LUTS and recent UroLift procedure.  Patient seen postoperatively and doing well.  He is slightly drowsy from anesthesia but arouses easily and responds appropriately.  He denies any acute pain or complaints at this time.  He denies chest pain, shortness breast, nausea or vomiting.  Patient will be observed by Hospital Medicine overnight for any postoperative complications.          Procedure(s) (LRB):  TURP (TRANSURETHRAL RESECTION OF PROSTATE) (N/A)      Hospital Course:   No notes on file     Goals of Care Treatment Preferences:         Consults:   Consults (From admission, onward)        Status Ordering Provider     Inpatient virtual consult to Hospital Medicine  Once        Provider:  (Not yet assigned)    BLAYNE Green          * Enlarged prostate with lower urinary tract symptoms (LUTS)  POD 0 s/p TURP with Dr. Rousseau  Continue to follow urology recommendations.  Needs aggressive incentive spirometry.  Follow  hemoglobin and hematocrit closely.  Pain control with narcotics and antiemetics as needed.  SCDs for DVT prophylaxis    4/8/22    As per urology note:      - Ok to remove magdaleno this AM.   - Scan bladder after he voids. Ok to discharge without catheter if PVR less than 150 mL.   - Home with Keflex TID x 3 days.   - RTC in 6 weeks with symptom score and PVR.       Final Active Diagnoses:    Diagnosis Date Noted POA    PRINCIPAL PROBLEM:  Enlarged prostate with lower urinary tract symptoms (LUTS) [N40.1] 04/07/2022 Yes    Hypercholesteremia [E78.00] 12/09/2013 Yes     Chronic      Problems Resolved During this Admission:       Discharged Condition: good    Disposition: Home or Self Care    Follow Up:    Patient Instructions:      Ambulatory referral/consult to Urology   Standing Status: Future   Referral Priority: Routine Referral Type: Consultation   Referral Reason: Specialty Services Required   Requested Specialty: Urology   Number of Visits Requested: 1       Significant Diagnostic Studies: reviewed     Pending Diagnostic Studies:     None         Medications:  Reconciled Home Medications:      Medication List      CONTINUE taking these medications    ascorbic acid (vitamin C) 1000 MG tablet  Commonly known as: VITAMIN C  Take 1 tablet by mouth once daily.     atorvastatin 10 MG tablet  Commonly known as: LIPITOR  Take 10 mg by mouth once daily.     cholecalciferol (vitamin D3) 50 mcg (2,000 unit) Cap capsule  Commonly known as: VITAMIN D3  Take 1 capsule by mouth once daily.     tamsulosin 0.4 mg Cap  Commonly known as: FLOMAX  Take 0.4 mg by mouth.        ASK your doctor about these medications    cephALEXin 500 MG capsule  Commonly known as: KEFLEX  Take 1 capsule (500 mg total) by mouth every 8 (eight) hours. for 3 days  Ask about: Should I take this medication?            Indwelling Lines/Drains at time of discharge:   Lines/Drains/Airways     None                 Time spent on the discharge of patient: 35  minutes         The attending portion of this evaluation, treatment, and documentation was performed per Uvaldo Jung MD via Telemedicine AudioVisual using the secure [employer] software platform with 2 way audio/video. The provider was located off-site and the patient is located in the hospital. The aforementioned video software was utilized to document the relevant history and physical exam    Uvaldo Jung MD  Department of Hospital Medicine  [facility]

## 2022-04-22 NOTE — ASSESSMENT & PLAN NOTE
POD 0 s/p TURP with Dr. Rousseau  Continue to follow urology recommendations.  Needs aggressive incentive spirometry.  Follow hemoglobin and hematocrit closely.  Pain control with narcotics and antiemetics as needed.  SCDs for DVT prophylaxis    4/8/22    As per urology note:      - Ok to remove magdaleno this AM.   - Scan bladder after he voids. Ok to discharge without catheter if PVR less than 150 mL.   - Home with Keflex TID x 3 days.   - RTC in 6 weeks with symptom score and PVR.

## 2022-06-03 ENCOUNTER — OFFICE VISIT (OUTPATIENT)
Dept: UROLOGY | Facility: CLINIC | Age: 67
End: 2022-06-03
Payer: MEDICARE

## 2022-06-03 VITALS
BODY MASS INDEX: 25.05 KG/M2 | HEIGHT: 70 IN | WEIGHT: 175 LBS | DIASTOLIC BLOOD PRESSURE: 74 MMHG | HEART RATE: 67 BPM | SYSTOLIC BLOOD PRESSURE: 117 MMHG

## 2022-06-03 DIAGNOSIS — N40.1 BENIGN PROSTATIC HYPERPLASIA WITH URINARY OBSTRUCTION: ICD-10-CM

## 2022-06-03 DIAGNOSIS — N13.8 BENIGN PROSTATIC HYPERPLASIA WITH URINARY OBSTRUCTION: ICD-10-CM

## 2022-06-03 PROCEDURE — 1126F PR PAIN SEVERITY QUANTIFIED, NO PAIN PRESENT: ICD-10-PCS | Mod: CPTII,S$GLB,, | Performed by: UROLOGY

## 2022-06-03 PROCEDURE — 3078F PR MOST RECENT DIASTOLIC BLOOD PRESSURE < 80 MM HG: ICD-10-PCS | Mod: CPTII,S$GLB,, | Performed by: UROLOGY

## 2022-06-03 PROCEDURE — 1126F AMNT PAIN NOTED NONE PRSNT: CPT | Mod: CPTII,S$GLB,, | Performed by: UROLOGY

## 2022-06-03 PROCEDURE — 1101F PR PT FALLS ASSESS DOC 0-1 FALLS W/OUT INJ PAST YR: ICD-10-PCS | Mod: CPTII,S$GLB,, | Performed by: UROLOGY

## 2022-06-03 PROCEDURE — 3008F BODY MASS INDEX DOCD: CPT | Mod: CPTII,S$GLB,, | Performed by: UROLOGY

## 2022-06-03 PROCEDURE — 3288F FALL RISK ASSESSMENT DOCD: CPT | Mod: CPTII,S$GLB,, | Performed by: UROLOGY

## 2022-06-03 PROCEDURE — 3074F SYST BP LT 130 MM HG: CPT | Mod: CPTII,S$GLB,, | Performed by: UROLOGY

## 2022-06-03 PROCEDURE — 1160F PR REVIEW ALL MEDS BY PRESCRIBER/CLIN PHARMACIST DOCUMENTED: ICD-10-PCS | Mod: CPTII,S$GLB,, | Performed by: UROLOGY

## 2022-06-03 PROCEDURE — 1160F RVW MEDS BY RX/DR IN RCRD: CPT | Mod: CPTII,S$GLB,, | Performed by: UROLOGY

## 2022-06-03 PROCEDURE — 3288F PR FALLS RISK ASSESSMENT DOCUMENTED: ICD-10-PCS | Mod: CPTII,S$GLB,, | Performed by: UROLOGY

## 2022-06-03 PROCEDURE — 3008F PR BODY MASS INDEX (BMI) DOCUMENTED: ICD-10-PCS | Mod: CPTII,S$GLB,, | Performed by: UROLOGY

## 2022-06-03 PROCEDURE — 99024 PR POST-OP FOLLOW-UP VISIT: ICD-10-PCS | Mod: S$GLB,,, | Performed by: UROLOGY

## 2022-06-03 PROCEDURE — 1101F PT FALLS ASSESS-DOCD LE1/YR: CPT | Mod: CPTII,S$GLB,, | Performed by: UROLOGY

## 2022-06-03 PROCEDURE — 1159F PR MEDICATION LIST DOCUMENTED IN MEDICAL RECORD: ICD-10-PCS | Mod: CPTII,S$GLB,, | Performed by: UROLOGY

## 2022-06-03 PROCEDURE — 1159F MED LIST DOCD IN RCRD: CPT | Mod: CPTII,S$GLB,, | Performed by: UROLOGY

## 2022-06-03 PROCEDURE — 99999 PR PBB SHADOW E&M-EST. PATIENT-LVL III: CPT | Mod: PBBFAC,,, | Performed by: UROLOGY

## 2022-06-03 PROCEDURE — 99024 POSTOP FOLLOW-UP VISIT: CPT | Mod: S$GLB,,, | Performed by: UROLOGY

## 2022-06-03 PROCEDURE — 3078F DIAST BP <80 MM HG: CPT | Mod: CPTII,S$GLB,, | Performed by: UROLOGY

## 2022-06-03 PROCEDURE — 99999 PR PBB SHADOW E&M-EST. PATIENT-LVL III: ICD-10-PCS | Mod: PBBFAC,,, | Performed by: UROLOGY

## 2022-06-03 PROCEDURE — 3074F PR MOST RECENT SYSTOLIC BLOOD PRESSURE < 130 MM HG: ICD-10-PCS | Mod: CPTII,S$GLB,, | Performed by: UROLOGY

## 2022-06-03 RX ORDER — CIPROFLOXACIN 500 MG/1
500 TABLET ORAL 2 TIMES DAILY
COMMUNITY
Start: 2022-01-14 | End: 2022-06-03

## 2022-06-03 NOTE — PROGRESS NOTES
[facility] Established Patient/H&P:    Jos Espino is a 66 y.o. male who presents for follow up for lower urinary tract symptoms.     Patient with a history of HLD who has a several year history of bothersome lower urinary tract symptoms that have been progressively worsening.      He reports weak stream, frequency, incomplete emptying, intermittency, straining and nocturia x 2. He has been on Flomax for numerous years with no significant improvement. Drinks mostly water throughout the day.      He underwent attempted Urolift of an enlarged median lobe with Dr. Gruber in [place] on 1/14/22. Per the op note, he was able to place one implant in the median lobe that did not appear to be placed well. Procedure aborted. Here reporting that his symptoms have persisted and he is bothered.      UA negative on 1/20/22.      He also has moderate erectile dysfunction. He tried Viagra, but discontinued as his wife was not satisfied. Not bothered by his erectile function.      Two brothers with prostate cancer. Works as a .      Interval History    6/3/22: He is now s/p uncomplicated TURP on 4/7/22. Path benign. Discharged home on POD 1. States he has been voiding well. IPSS improved to 4 from 17 pre-operatively. PVR 0 today. He has not discontinued Flomax.      Denies any fever, chills, flank pain, gross hematuria, bone pain, unintentional weight loss        PSA  1.03                 12/6/21     IPSS    QoL  4 1 6/3/22  17        4          3/10/22     PVR  0 mL  6/3/22  76 mL              3/10/22       Past Medical History:   Diagnosis Date    Hyperlipidemia        Past Surgical History:   Procedure Laterality Date    TRANSURETHRAL RESECTION OF PROSTATE N/A 4/7/2022    Procedure: TURP (TRANSURETHRAL RESECTION OF PROSTATE);  Surgeon: Ricky Rousseau Jr., MD;  Location: [facility] OR;  Service: Urology;  Laterality: N/A;       Review of patient's allergies indicates:  No Known  "Allergies    Medications Reviewed: see MAR    FOCUSED PHYSICAL EXAM:    Vitals:    06/03/22 1337   BP: 117/74   Pulse: 67     Body mass index is 25.11 kg/m². Weight: 79.4 kg (175 lb) Height: 5' 10" (177.8 cm)       General: Alert, cooperative, no distress, appears stated age  Abdomen: Soft, non-tender, no CVA tenderness, non-distended      LABS:    No results found for this or any previous visit (from the past 336 hour(s)).        Assessment/Diagnosis:    1. Benign prostatic hyperplasia with urinary obstruction  Ambulatory referral/consult to Urology       Plans:    - Doing well s/p uncomplicated TURP on 4/7/22. Ok to discontinue Flomax given his significant improvement in his symptoms.   - RTC in 1 year with symptom score and PVR.     "

## 2023-01-04 ENCOUNTER — TELEPHONE (OUTPATIENT)
Dept: UROLOGY | Facility: CLINIC | Age: 68
End: 2023-01-04
Payer: MEDICARE

## 2023-01-04 NOTE — TELEPHONE ENCOUNTER
----- Message from Maude Mcmillan sent at 1/4/2023 12:18 PM CST -----  Contact: wife  Type: Needs Medical Advice  Who Called:  Wife    Best Call Back Number: [phone]    Additional Information: Please call back trying to luc appt

## 2023-01-04 NOTE — TELEPHONE ENCOUNTER
Called and spoke to patient wife regarding scheduling annual appt. Appt scheduled 6/5/2023 for 10:15am. Appt confirmed.

## 2023-06-05 ENCOUNTER — LAB VISIT (OUTPATIENT)
Dept: LAB | Facility: HOSPITAL | Age: 68
End: 2023-06-05
Attending: UROLOGY
Payer: MEDICARE

## 2023-06-05 ENCOUNTER — OFFICE VISIT (OUTPATIENT)
Dept: UROLOGY | Facility: CLINIC | Age: 68
End: 2023-06-05
Payer: MEDICARE

## 2023-06-05 VITALS
HEART RATE: 73 BPM | WEIGHT: 182.31 LBS | DIASTOLIC BLOOD PRESSURE: 77 MMHG | BODY MASS INDEX: 26.1 KG/M2 | SYSTOLIC BLOOD PRESSURE: 144 MMHG | HEIGHT: 70 IN

## 2023-06-05 DIAGNOSIS — N52.8 OTHER MALE ERECTILE DYSFUNCTION: ICD-10-CM

## 2023-06-05 DIAGNOSIS — N13.8 BENIGN PROSTATIC HYPERPLASIA WITH URINARY OBSTRUCTION: Primary | ICD-10-CM

## 2023-06-05 DIAGNOSIS — Z12.5 SCREENING FOR PROSTATE CANCER: ICD-10-CM

## 2023-06-05 DIAGNOSIS — N40.1 BENIGN PROSTATIC HYPERPLASIA WITH URINARY OBSTRUCTION: Primary | ICD-10-CM

## 2023-06-05 LAB
COMPLEXED PSA SERPL-MCNC: 1.1 NG/ML (ref 0–4)
POC RESIDUAL URINE VOLUME: 0 ML (ref 0–100)

## 2023-06-05 PROCEDURE — 3078F DIAST BP <80 MM HG: CPT | Mod: CPTII,S$GLB,, | Performed by: UROLOGY

## 2023-06-05 PROCEDURE — 3288F FALL RISK ASSESSMENT DOCD: CPT | Mod: CPTII,S$GLB,, | Performed by: UROLOGY

## 2023-06-05 PROCEDURE — 3008F PR BODY MASS INDEX (BMI) DOCUMENTED: ICD-10-PCS | Mod: CPTII,S$GLB,, | Performed by: UROLOGY

## 2023-06-05 PROCEDURE — 3288F PR FALLS RISK ASSESSMENT DOCUMENTED: ICD-10-PCS | Mod: CPTII,S$GLB,, | Performed by: UROLOGY

## 2023-06-05 PROCEDURE — 1159F MED LIST DOCD IN RCRD: CPT | Mod: CPTII,S$GLB,, | Performed by: UROLOGY

## 2023-06-05 PROCEDURE — 51798 US URINE CAPACITY MEASURE: CPT | Mod: S$GLB,,, | Performed by: UROLOGY

## 2023-06-05 PROCEDURE — 3077F PR MOST RECENT SYSTOLIC BLOOD PRESSURE >= 140 MM HG: ICD-10-PCS | Mod: CPTII,S$GLB,, | Performed by: UROLOGY

## 2023-06-05 PROCEDURE — 1160F PR REVIEW ALL MEDS BY PRESCRIBER/CLIN PHARMACIST DOCUMENTED: ICD-10-PCS | Mod: CPTII,S$GLB,, | Performed by: UROLOGY

## 2023-06-05 PROCEDURE — 84153 ASSAY OF PSA TOTAL: CPT | Performed by: UROLOGY

## 2023-06-05 PROCEDURE — 3008F BODY MASS INDEX DOCD: CPT | Mod: CPTII,S$GLB,, | Performed by: UROLOGY

## 2023-06-05 PROCEDURE — 99999 PR PBB SHADOW E&M-EST. PATIENT-LVL III: ICD-10-PCS | Mod: PBBFAC,,, | Performed by: UROLOGY

## 2023-06-05 PROCEDURE — 99999 PR PBB SHADOW E&M-EST. PATIENT-LVL III: CPT | Mod: PBBFAC,,, | Performed by: UROLOGY

## 2023-06-05 PROCEDURE — 1159F PR MEDICATION LIST DOCUMENTED IN MEDICAL RECORD: ICD-10-PCS | Mod: CPTII,S$GLB,, | Performed by: UROLOGY

## 2023-06-05 PROCEDURE — 1126F AMNT PAIN NOTED NONE PRSNT: CPT | Mod: CPTII,S$GLB,, | Performed by: UROLOGY

## 2023-06-05 PROCEDURE — 51798 POCT BLADDER SCAN: ICD-10-PCS | Mod: S$GLB,,, | Performed by: UROLOGY

## 2023-06-05 PROCEDURE — 36415 COLL VENOUS BLD VENIPUNCTURE: CPT | Performed by: UROLOGY

## 2023-06-05 PROCEDURE — 99214 OFFICE O/P EST MOD 30 MIN: CPT | Mod: S$GLB,,, | Performed by: UROLOGY

## 2023-06-05 PROCEDURE — 3078F PR MOST RECENT DIASTOLIC BLOOD PRESSURE < 80 MM HG: ICD-10-PCS | Mod: CPTII,S$GLB,, | Performed by: UROLOGY

## 2023-06-05 PROCEDURE — 3077F SYST BP >= 140 MM HG: CPT | Mod: CPTII,S$GLB,, | Performed by: UROLOGY

## 2023-06-05 PROCEDURE — 99214 PR OFFICE/OUTPT VISIT, EST, LEVL IV, 30-39 MIN: ICD-10-PCS | Mod: S$GLB,,, | Performed by: UROLOGY

## 2023-06-05 PROCEDURE — 1101F PT FALLS ASSESS-DOCD LE1/YR: CPT | Mod: CPTII,S$GLB,, | Performed by: UROLOGY

## 2023-06-05 PROCEDURE — 1126F PR PAIN SEVERITY QUANTIFIED, NO PAIN PRESENT: ICD-10-PCS | Mod: CPTII,S$GLB,, | Performed by: UROLOGY

## 2023-06-05 PROCEDURE — 1101F PR PT FALLS ASSESS DOC 0-1 FALLS W/OUT INJ PAST YR: ICD-10-PCS | Mod: CPTII,S$GLB,, | Performed by: UROLOGY

## 2023-06-05 PROCEDURE — 1160F RVW MEDS BY RX/DR IN RCRD: CPT | Mod: CPTII,S$GLB,, | Performed by: UROLOGY

## 2023-06-05 RX ORDER — TADALAFIL 5 MG/1
5 TABLET ORAL DAILY PRN
Qty: 30 TABLET | Refills: 11 | Status: SHIPPED | OUTPATIENT
Start: 2023-06-05 | End: 2024-06-04

## 2023-06-05 RX ORDER — SODIUM PICOSULFATE, MAGNESIUM OXIDE, AND ANHYDROUS CITRIC ACID 10; 3.5; 12 MG/160ML; G/160ML; G/160ML
LIQUID ORAL
COMMUNITY
Start: 2023-02-21 | End: 2023-10-31

## 2023-06-05 NOTE — PROGRESS NOTES
[facility] Established Patient/H&P:    Jos Espino is a 67 y.o. male who presents for follow up for lower urinary tract symptoms.     Patient with a history of HLD who has a several year history of bothersome lower urinary tract symptoms that have been progressively worsening.      He reports weak stream, frequency, incomplete emptying, intermittency, straining and nocturia x 2. He has been on Flomax for numerous years with no significant improvement. Drinks mostly water throughout the day.      He underwent attempted Urolift of an enlarged median lobe with Dr. Gruber in [place] on 1/14/22. Per the op note, he was able to place one implant in the median lobe that did not appear to be placed well. Procedure aborted. Here reporting that his symptoms have persisted and he is bothered.      UA negative on 1/20/22.      He also has moderate erectile dysfunction. He tried Viagra, but discontinued as his wife was not satisfied. Not bothered by his erectile function.      Two brothers with prostate cancer. Works as a .        Interval History     6/3/22: He is now s/p uncomplicated TURP on 4/7/22. Path benign. Discharged home on POD 1. States he has been voiding well. IPSS improved to 4 from 17 pre-operatively. PVR 0 today. He has not discontinued Flomax.    6/5/23: Here today for follow up. States that he is continuing to do well. IPSS 1. PVR 0 mL. He has discontinued Flomax. No complaints.  He also reports mild erectile dysfunction. Has trouble obtaining an erection intermittently. Has not tried any medication. Has tried Viagra several years ago, but stopped as he had side effects.      Denies any fever, chills, flank pain, gross hematuria, bone pain, unintentional weight loss        PSA  1.03                 12/6/21     IPSS    QoL  1 0 6/5/23  4          1          6/3/22  17        4          3/10/22     PVR  0 mL  6/5/23  0 mL                6/3/22  76 mL               "3/10/22    Past Medical History:   Diagnosis Date    Hyperlipidemia        Past Surgical History:   Procedure Laterality Date    TRANSURETHRAL RESECTION OF PROSTATE N/A 4/7/2022    Procedure: TURP (TRANSURETHRAL RESECTION OF PROSTATE);  Surgeon: Ricky Rousseau Jr., MD;  Location: [facility];  Service: Urology;  Laterality: N/A;       Review of patient's allergies indicates:  No Known Allergies    Medications Reviewed: see MAR    FOCUSED PHYSICAL EXAM:    Vitals:    06/05/23 1001   BP: (!) 144/77   Pulse: 73     Body mass index is 26.16 kg/m². Weight: 82.7 kg (182 lb 5.1 oz) Height: 5' 10" (177.8 cm)       General: Alert, cooperative, no distress, appears stated age  Abdomen: Soft, non-tender, no CVA tenderness, non-distended      LABS:    Recent Results (from the past 336 hour(s))   POCT Bladder Scan    Collection Time: 06/05/23 10:02 AM   Result Value Ref Range    POC Residual Urine Volume 0 0 - 100 mL             Assessment/Diagnosis:    1. Benign prostatic hyperplasia with urinary obstruction  POCT Bladder Scan      2. Screening for prostate cancer  PSA, Screening      3. Other male erectile dysfunction  tadalafiL (CIALIS) 5 MG tablet          Plans:    - I spent 30 minutes of the day of this encounter preparing for, treating and managing this patient. Extensive discussion with patient regarding the etiology and management of his lower urinary tract symptoms. Explained that LUTS are multifactorial and can be secondary to an enlarged prostate, PO intake of bladder irritants, overactive bladder, constipation, malignancy, trauma, infection, stones or medications.   - LUTS have improved significantly s/p TURP.  - PSA today.   - RX for Cialis 5 mg PO daily as needed for his ED.   - RTC in 1 year with symptom score and PSA.       "

## 2023-10-30 ENCOUNTER — HOSPITAL ENCOUNTER (INPATIENT)
Facility: HOSPITAL | Age: 68
LOS: 8 days | Discharge: NURSING, REHAB OR LONG-TERM CARE | DRG: 500 | End: 2023-11-09
Attending: EMERGENCY MEDICINE | Admitting: INTERNAL MEDICINE
Payer: MEDICARE

## 2023-10-30 DIAGNOSIS — N52.8 OTHER MALE ERECTILE DYSFUNCTION: ICD-10-CM

## 2023-10-30 DIAGNOSIS — R58 RETROPERITONEAL HEMORRHAGE: Primary | ICD-10-CM

## 2023-10-30 LAB
ALBUMIN SERPL BCP-MCNC: 4.4 G/DL (ref 3.5–5.2)
ALP SERPL-CCNC: 58 U/L (ref 55–135)
ALT SERPL W/O P-5'-P-CCNC: 15 U/L (ref 10–44)
ANION GAP SERPL CALC-SCNC: 8 MMOL/L (ref 8–16)
AST SERPL-CCNC: 22 U/L (ref 10–40)
BASOPHILS # BLD AUTO: 0.05 K/UL (ref 0–0.2)
BASOPHILS NFR BLD: 0.5 % (ref 0–1.9)
BILIRUB SERPL-MCNC: 0.7 MG/DL (ref 0.1–1)
BILIRUB UR QL STRIP: NEGATIVE
BUN SERPL-MCNC: 13 MG/DL (ref 8–23)
CALCIUM SERPL-MCNC: 10 MG/DL (ref 8.7–10.5)
CHLORIDE SERPL-SCNC: 105 MMOL/L (ref 95–110)
CLARITY UR: CLEAR
CO2 SERPL-SCNC: 26 MMOL/L (ref 23–29)
COLOR UR: YELLOW
CREAT SERPL-MCNC: 1.1 MG/DL (ref 0.5–1.4)
CREAT SERPL-MCNC: 1.1 MG/DL (ref 0.5–1.4)
DIFFERENTIAL METHOD: ABNORMAL
EOSINOPHIL # BLD AUTO: 0.2 K/UL (ref 0–0.5)
EOSINOPHIL NFR BLD: 2.2 % (ref 0–8)
ERYTHROCYTE [DISTWIDTH] IN BLOOD BY AUTOMATED COUNT: 12.6 % (ref 11.5–14.5)
EST. GFR  (NO RACE VARIABLE): >60 ML/MIN/1.73 M^2
GLUCOSE SERPL-MCNC: 135 MG/DL (ref 70–110)
GLUCOSE UR QL STRIP: NEGATIVE
HCT VFR BLD AUTO: 38.4 % (ref 40–54)
HGB BLD-MCNC: 12.9 G/DL (ref 14–18)
HGB UR QL STRIP: NEGATIVE
IMM GRANULOCYTES # BLD AUTO: 0.04 K/UL (ref 0–0.04)
IMM GRANULOCYTES NFR BLD AUTO: 0.4 % (ref 0–0.5)
KETONES UR QL STRIP: NEGATIVE
LEUKOCYTE ESTERASE UR QL STRIP: NEGATIVE
LYMPHOCYTES # BLD AUTO: 1.7 K/UL (ref 1–4.8)
LYMPHOCYTES NFR BLD: 17.6 % (ref 18–48)
MCH RBC QN AUTO: 27.4 PG (ref 27–31)
MCHC RBC AUTO-ENTMCNC: 33.6 G/DL (ref 32–36)
MCV RBC AUTO: 82 FL (ref 82–98)
MONOCYTES # BLD AUTO: 1 K/UL (ref 0.3–1)
MONOCYTES NFR BLD: 9.8 % (ref 4–15)
NEUTROPHILS # BLD AUTO: 6.8 K/UL (ref 1.8–7.7)
NEUTROPHILS NFR BLD: 69.5 % (ref 38–73)
NITRITE UR QL STRIP: NEGATIVE
NRBC BLD-RTO: 0 /100 WBC
PH UR STRIP: 7 [PH] (ref 5–8)
PLATELET # BLD AUTO: 205 K/UL (ref 150–450)
PMV BLD AUTO: 13.5 FL (ref 9.2–12.9)
POTASSIUM SERPL-SCNC: 3.8 MMOL/L (ref 3.5–5.1)
PROT SERPL-MCNC: 7.6 G/DL (ref 6–8.4)
PROT UR QL STRIP: NEGATIVE
RBC # BLD AUTO: 4.7 M/UL (ref 4.6–6.2)
SAMPLE: NORMAL
SODIUM SERPL-SCNC: 139 MMOL/L (ref 136–145)
SP GR UR STRIP: 1.01 (ref 1–1.03)
URN SPEC COLLECT METH UR: NORMAL
UROBILINOGEN UR STRIP-ACNC: NEGATIVE EU/DL
WBC # BLD AUTO: 9.73 K/UL (ref 3.9–12.7)

## 2023-10-30 PROCEDURE — 85025 COMPLETE CBC W/AUTO DIFF WBC: CPT

## 2023-10-30 PROCEDURE — 63600175 PHARM REV CODE 636 W HCPCS

## 2023-10-30 PROCEDURE — 25000003 PHARM REV CODE 250

## 2023-10-30 PROCEDURE — 96374 THER/PROPH/DIAG INJ IV PUSH: CPT

## 2023-10-30 PROCEDURE — 80053 COMPREHEN METABOLIC PANEL: CPT

## 2023-10-30 PROCEDURE — 82565 ASSAY OF CREATININE: CPT

## 2023-10-30 PROCEDURE — 81003 URINALYSIS AUTO W/O SCOPE: CPT

## 2023-10-30 PROCEDURE — 25500020 PHARM REV CODE 255

## 2023-10-30 RX ORDER — ONDANSETRON 2 MG/ML
4 INJECTION INTRAMUSCULAR; INTRAVENOUS
Status: COMPLETED | OUTPATIENT
Start: 2023-10-30 | End: 2023-10-30

## 2023-10-30 RX ORDER — OXYCODONE AND ACETAMINOPHEN 5; 325 MG/1; MG/1
1 TABLET ORAL
Status: COMPLETED | OUTPATIENT
Start: 2023-10-30 | End: 2023-10-30

## 2023-10-30 RX ADMIN — IOHEXOL 100 ML: 350 INJECTION, SOLUTION INTRAVENOUS at 08:10

## 2023-10-30 RX ADMIN — ONDANSETRON 4 MG: 2 INJECTION INTRAMUSCULAR; INTRAVENOUS at 07:10

## 2023-10-30 RX ADMIN — OXYCODONE AND ACETAMINOPHEN 1 TABLET: 5; 325 TABLET ORAL at 07:10

## 2023-10-31 PROBLEM — R58 RETROPERITONEAL HEMORRHAGE: Status: ACTIVE | Noted: 2023-10-31

## 2023-10-31 LAB
ALBUMIN SERPL BCP-MCNC: 4.4 G/DL (ref 3.5–5.2)
ALP SERPL-CCNC: 56 U/L (ref 55–135)
ALT SERPL W/O P-5'-P-CCNC: 15 U/L (ref 10–44)
ANION GAP SERPL CALC-SCNC: 7 MMOL/L (ref 8–16)
AST SERPL-CCNC: 18 U/L (ref 10–40)
BASOPHILS # BLD AUTO: 0.02 K/UL (ref 0–0.2)
BASOPHILS NFR BLD: 0.2 % (ref 0–1.9)
BILIRUB SERPL-MCNC: 0.9 MG/DL (ref 0.1–1)
BUN SERPL-MCNC: 11 MG/DL (ref 8–23)
CALCIUM SERPL-MCNC: 9.9 MG/DL (ref 8.7–10.5)
CHLORIDE SERPL-SCNC: 105 MMOL/L (ref 95–110)
CO2 SERPL-SCNC: 25 MMOL/L (ref 23–29)
CREAT SERPL-MCNC: 1.1 MG/DL (ref 0.5–1.4)
DIFFERENTIAL METHOD: ABNORMAL
EOSINOPHIL # BLD AUTO: 0 K/UL (ref 0–0.5)
EOSINOPHIL NFR BLD: 0.2 % (ref 0–8)
ERYTHROCYTE [DISTWIDTH] IN BLOOD BY AUTOMATED COUNT: 12.6 % (ref 11.5–14.5)
EST. GFR  (NO RACE VARIABLE): >60 ML/MIN/1.73 M^2
GIANT PLATELETS BLD QL SMEAR: PRESENT
GLUCOSE SERPL-MCNC: 153 MG/DL (ref 70–110)
HCT VFR BLD AUTO: 35.4 % (ref 40–54)
HGB BLD-MCNC: 12.3 G/DL (ref 14–18)
IMM GRANULOCYTES # BLD AUTO: 0.05 K/UL (ref 0–0.04)
IMM GRANULOCYTES NFR BLD AUTO: 0.4 % (ref 0–0.5)
LYMPHOCYTES # BLD AUTO: 1.2 K/UL (ref 1–4.8)
LYMPHOCYTES NFR BLD: 9.1 % (ref 18–48)
MCH RBC QN AUTO: 28 PG (ref 27–31)
MCHC RBC AUTO-ENTMCNC: 34.7 G/DL (ref 32–36)
MCV RBC AUTO: 81 FL (ref 82–98)
MONOCYTES # BLD AUTO: 1.2 K/UL (ref 0.3–1)
MONOCYTES NFR BLD: 9 % (ref 4–15)
NEUTROPHILS # BLD AUTO: 10.3 K/UL (ref 1.8–7.7)
NEUTROPHILS NFR BLD: 81.1 % (ref 38–73)
NRBC BLD-RTO: 0 /100 WBC
PLATELET # BLD AUTO: 189 K/UL (ref 150–450)
PLATELET BLD QL SMEAR: ABNORMAL
PMV BLD AUTO: 12.8 FL (ref 9.2–12.9)
POTASSIUM SERPL-SCNC: 4.2 MMOL/L (ref 3.5–5.1)
PROT SERPL-MCNC: 7.7 G/DL (ref 6–8.4)
RBC # BLD AUTO: 4.4 M/UL (ref 4.6–6.2)
SODIUM SERPL-SCNC: 137 MMOL/L (ref 136–145)
TROPONIN I SERPL HS-MCNC: 3.9 PG/ML (ref 0–14.9)
TROPONIN I SERPL HS-MCNC: 5.8 PG/ML (ref 0–14.9)
WBC # BLD AUTO: 12.73 K/UL (ref 3.9–12.7)

## 2023-10-31 PROCEDURE — 99900031 HC PATIENT EDUCATION (STAT)

## 2023-10-31 PROCEDURE — G0378 HOSPITAL OBSERVATION PER HR: HCPCS

## 2023-10-31 PROCEDURE — 99223 1ST HOSP IP/OBS HIGH 75: CPT | Mod: 57,25,, | Performed by: SURGERY

## 2023-10-31 PROCEDURE — 85025 COMPLETE CBC W/AUTO DIFF WBC: CPT | Performed by: NURSE PRACTITIONER

## 2023-10-31 PROCEDURE — 99223 PR INITIAL HOSPITAL CARE,LEVL III: ICD-10-PCS | Mod: 57,25,, | Performed by: SURGERY

## 2023-10-31 PROCEDURE — 99285 EMERGENCY DEPT VISIT HI MDM: CPT | Mod: 25

## 2023-10-31 PROCEDURE — 96376 TX/PRO/DX INJ SAME DRUG ADON: CPT

## 2023-10-31 PROCEDURE — 25000003 PHARM REV CODE 250: Performed by: INTERNAL MEDICINE

## 2023-10-31 PROCEDURE — 63600175 PHARM REV CODE 636 W HCPCS: Performed by: INTERNAL MEDICINE

## 2023-10-31 PROCEDURE — 63600175 PHARM REV CODE 636 W HCPCS: Performed by: NURSE PRACTITIONER

## 2023-10-31 PROCEDURE — 25000003 PHARM REV CODE 250: Performed by: NURSE PRACTITIONER

## 2023-10-31 PROCEDURE — 80053 COMPREHEN METABOLIC PANEL: CPT | Performed by: NURSE PRACTITIONER

## 2023-10-31 PROCEDURE — 84484 ASSAY OF TROPONIN QUANT: CPT | Performed by: NURSE PRACTITIONER

## 2023-10-31 PROCEDURE — 96375 TX/PRO/DX INJ NEW DRUG ADDON: CPT

## 2023-10-31 PROCEDURE — 94761 N-INVAS EAR/PLS OXIMETRY MLT: CPT

## 2023-10-31 PROCEDURE — 63600175 PHARM REV CODE 636 W HCPCS: Performed by: EMERGENCY MEDICINE

## 2023-10-31 RX ORDER — ACETAMINOPHEN 325 MG/1
650 TABLET ORAL EVERY 8 HOURS PRN
Status: DISCONTINUED | OUTPATIENT
Start: 2023-10-31 | End: 2023-11-01

## 2023-10-31 RX ORDER — HYDROMORPHONE HYDROCHLORIDE 1 MG/ML
1 INJECTION, SOLUTION INTRAMUSCULAR; INTRAVENOUS; SUBCUTANEOUS EVERY 6 HOURS PRN
Status: DISCONTINUED | OUTPATIENT
Start: 2023-10-31 | End: 2023-10-31

## 2023-10-31 RX ORDER — TALC
6 POWDER (GRAM) TOPICAL NIGHTLY PRN
Status: DISCONTINUED | OUTPATIENT
Start: 2023-10-31 | End: 2023-11-09 | Stop reason: HOSPADM

## 2023-10-31 RX ORDER — FENTANYL 25 UG/1
1 PATCH TRANSDERMAL
Status: DISCONTINUED | OUTPATIENT
Start: 2023-11-01 | End: 2023-11-07

## 2023-10-31 RX ORDER — ONDANSETRON 2 MG/ML
4 INJECTION INTRAMUSCULAR; INTRAVENOUS EVERY 8 HOURS PRN
Status: DISCONTINUED | OUTPATIENT
Start: 2023-10-31 | End: 2023-11-09 | Stop reason: HOSPADM

## 2023-10-31 RX ORDER — HYDROMORPHONE HYDROCHLORIDE 1 MG/ML
1 INJECTION, SOLUTION INTRAMUSCULAR; INTRAVENOUS; SUBCUTANEOUS
Status: COMPLETED | OUTPATIENT
Start: 2023-10-31 | End: 2023-10-31

## 2023-10-31 RX ORDER — ATORVASTATIN CALCIUM 10 MG/1
10 TABLET, FILM COATED ORAL DAILY
Status: DISCONTINUED | OUTPATIENT
Start: 2023-10-31 | End: 2023-11-05

## 2023-10-31 RX ORDER — HYDROMORPHONE HYDROCHLORIDE 1 MG/ML
2 INJECTION, SOLUTION INTRAMUSCULAR; INTRAVENOUS; SUBCUTANEOUS EVERY 6 HOURS PRN
Status: DISCONTINUED | OUTPATIENT
Start: 2023-10-31 | End: 2023-11-01

## 2023-10-31 RX ORDER — MULTIVIT-MIN/FA/LYCOPEN/LUTEIN .4-300-25
1 TABLET ORAL DAILY
COMMUNITY

## 2023-10-31 RX ORDER — SODIUM CHLORIDE 0.9 % (FLUSH) 0.9 %
10 SYRINGE (ML) INJECTION
Status: DISCONTINUED | OUTPATIENT
Start: 2023-10-31 | End: 2023-11-09 | Stop reason: HOSPADM

## 2023-10-31 RX ORDER — HYDROQUINONE 40 MG/G
1 CREAM TOPICAL 2 TIMES DAILY
Status: ON HOLD | COMMUNITY
Start: 2023-07-19 | End: 2023-11-07 | Stop reason: HOSPADM

## 2023-10-31 RX ORDER — ONDANSETRON 2 MG/ML
4 INJECTION INTRAMUSCULAR; INTRAVENOUS
Status: COMPLETED | OUTPATIENT
Start: 2023-10-31 | End: 2023-10-31

## 2023-10-31 RX ORDER — HYDRALAZINE HYDROCHLORIDE 20 MG/ML
10 INJECTION INTRAMUSCULAR; INTRAVENOUS
Status: COMPLETED | OUTPATIENT
Start: 2023-10-31 | End: 2023-10-31

## 2023-10-31 RX ADMIN — HYDRALAZINE HYDROCHLORIDE 10 MG: 20 INJECTION INTRAMUSCULAR; INTRAVENOUS at 01:10

## 2023-10-31 RX ADMIN — HYDROMORPHONE HYDROCHLORIDE 1 MG: 1 INJECTION, SOLUTION INTRAMUSCULAR; INTRAVENOUS; SUBCUTANEOUS at 07:10

## 2023-10-31 RX ADMIN — ONDANSETRON 4 MG: 2 INJECTION INTRAMUSCULAR; INTRAVENOUS at 12:10

## 2023-10-31 RX ADMIN — ACETAMINOPHEN 650 MG: 325 TABLET ORAL at 10:10

## 2023-10-31 RX ADMIN — HYDROMORPHONE HYDROCHLORIDE 1 MG: 1 INJECTION, SOLUTION INTRAMUSCULAR; INTRAVENOUS; SUBCUTANEOUS at 02:10

## 2023-10-31 RX ADMIN — HYDROMORPHONE HYDROCHLORIDE 1 MG: 1 INJECTION, SOLUTION INTRAMUSCULAR; INTRAVENOUS; SUBCUTANEOUS at 12:10

## 2023-10-31 RX ADMIN — HYDROMORPHONE HYDROCHLORIDE 1 MG: 1 INJECTION, SOLUTION INTRAMUSCULAR; INTRAVENOUS; SUBCUTANEOUS at 01:10

## 2023-10-31 RX ADMIN — FENTANYL 1 PATCH: 25 PATCH TRANSDERMAL at 11:10

## 2023-10-31 RX ADMIN — HYDROMORPHONE HYDROCHLORIDE 2 MG: 1 INJECTION, SOLUTION INTRAMUSCULAR; INTRAVENOUS; SUBCUTANEOUS at 08:10

## 2023-10-31 NOTE — FIRST PROVIDER EVALUATION
"Medical screening examination initiated.  I have conducted a focused provider triage encounter, findings are as follows:    Brief history of present illness:  Left flank and left groin pain started today.     Vitals:    10/30/23 1902   BP: (!) 203/97   BP Location: Left arm   Patient Position: Sitting   Pulse: (!) 59   Resp: 18   Temp: 97.8 °F (36.6 °C)   TempSrc: Oral   SpO2: 98%   Weight: 81.6 kg (180 lb)   Height: 5' 10" (1.778 m)       Pertinent physical exam:  Hypertension    Brief workup plan:  Labs and imaging    Preliminary workup initiated; this workup will be continued and followed by the physician or advanced practice provider that is assigned to the patient when roomed.  "

## 2023-10-31 NOTE — H&P
Count includes the [facility] Medicine History & Physical Examination   Patient Name: Jos Espino  MRN: [id number]  Patient Class: Emergency   Admission Date: 10/30/2023  7:00 PM  Length of Stay: 0  Attending Physician:   Primary Care Provider: Abiel Salguero MD  Face-to-Face encounter date: 10/31/2023  Code Status: Full Code  MPOA:  Chief Complaint: Groin Pain (Left groin pain radiates from left flank down to left knee started yesterday)        Patient information was obtained from patient, past medical records and ER records.   HISTORY OF PRESENT ILLNESS:   Jos Espino is a 68 y.o. old  male who  has a past medical history of Hyperlipidemia.. The patient presented to [facility] on 10/30/2023 with a primary complaint of Groin Pain (Left groin pain radiates from left flank down to left knee started yesterday)      68 year old  male presents emergency room with left pelvis/groin pain.  The patient states the pain began yesterday.  He does admit to attending exercise classes that includes exercising his legs with weights about 4 to 5 times a week at the [employer] + exercise gym.  But he denied actual trauma.  The patient states his left thigh pain became excruciating with minimal range of motion so he came to emergency room for further evaluation      In the emergency room radiographic imaging revealed a Iliopsoas and left pelvic side wall intramuscular hematomas with a small amount of what is likely hemorrhage within the left retroperitoneum    General surgery was consulted and agreed to see the patient in consult.  The patient will be given pain management and monitored closely      Past medical history significant for hyperlipidemia ED and hypertension that is diet-controlled      The patient is not on anticoagulation and denies excessive NSAID use    REVIEW OF SYSTEMS:   10 Point Review of System was performed and was found to be negative except for that mentioned already in  the HPI and   Review of Systems (Negative unless checked off)  Review of Systems   Constitutional: Negative.    HENT: Negative.     Eyes: Negative.    Respiratory: Negative.     Cardiovascular: Negative.    Gastrointestinal: Negative.    Genitourinary: Negative.    Musculoskeletal:  Positive for joint pain.        Left leg pain (POA)   Skin: Negative.    Neurological: Negative.    Endo/Heme/Allergies: Negative.    Psychiatric/Behavioral: Negative.             PAST MEDICAL HISTORY:     Past Medical History:   Diagnosis Date    Hyperlipidemia        PAST SURGICAL HISTORY:     Past Surgical History:   Procedure Laterality Date    TRANSURETHRAL RESECTION OF PROSTATE N/A 4/7/2022    Procedure: TURP (TRANSURETHRAL RESECTION OF PROSTATE);  Surgeon: Ricky Rousseau Jr., MD;  Location: [facility];  Service: Urology;  Laterality: N/A;       ALLERGIES:   Patient has no known allergies.    FAMILY HISTORY:     Family History   Problem Relation Age of Onset    Hypertension Brother     Cancer Brother         prostate       SOCIAL HISTORY:     Social History     Tobacco Use    Smoking status: Never    Smokeless tobacco: Never   Substance Use Topics    Alcohol use: Yes     Comment: seldom        Social History     Substance and Sexual Activity   Sexual Activity Yes    Partners: Female    Birth control/protection: None        HOME MEDICATIONS:     Prior to Admission medications    Medication Sig Start Date End Date Taking? Authorizing Provider   ascorbic acid, vitamin C, (VITAMIN C) 1000 MG tablet Take 1 tablet by mouth once daily. 1/1/22   Provider, Historical   atorvastatin (LIPITOR) 10 MG tablet Take 10 mg by mouth once daily.    Provider, Historical   cholecalciferol, vitamin D3, (VITAMIN D3) 50 mcg (2,000 unit) Cap Take 1 capsule by mouth once daily. 1/1/22   Provider, Historical   CLENPIQ 10 mg-3.5 gram- 12 gram/160 mL Soln Take by mouth. 2/21/23   Provider, Historical   hydroquinone 4 % Crea Apply 1 application  topically 2 (two)  "times daily. 7/19/23   Provider, Historical   tadalafiL (CIALIS) 5 MG tablet Take 1 tablet (5 mg total) by mouth daily as needed for Erectile Dysfunction. 6/5/23 6/4/24  Ricky Rousseau Jr., MD         PHYSICAL EXAM:   BP (!) 205/99   Pulse (!) 59   Temp 97.8 °F (36.6 °C) (Oral)   Resp 15   Ht 5' 10" (1.778 m)   Wt 81.6 kg (180 lb)   SpO2 98%   BMI 25.83 kg/m²   Vitals Reviewed  General appearance: Well-developed, well-nourished male in no apparent distress.  Skin: No Rash.   Neuro: Motor and sensory exams grossly intact. Good tone. Power in all 4 extremities 5/5.   HENT: Atraumatic head. Moist mucous membranes of oral cavity.  Eyes: Normal extraocular movements.   Neck: Supple. No evidence of lymphadenopathy. No thyroidomegaly.  Lungs: Clear to auscultation bilaterally. No wheezing present.   Heart: Regular rate and rhythm. S1 and S2 present with no murmurs/gallop/rub. No pedal edema. No JVD present.   Abdomen: Soft, non-distended, + tenderness to lower abdomen on left side r. No rebound tenderness/guarding. No masses or organomegaly. Bowel sounds are normal. Bladder is not palpable.   Extremities: No cyanosis, clubbing, or edema.  Psych/mental status: Alert and oriented. Cooperative. Responds appropriately to questions.   EMERGENCY DEPARTMENT LABS AND IMAGING:   Following labs were Reviewed   Recent Labs   Lab 10/30/23  1934   WBC 9.73   HGB 12.9*   HCT 38.4*      CALCIUM 10.0   ALBUMIN 4.4   PROT 7.6      K 3.8   CO2 26      BUN 13   CREATININE 1.1   ALKPHOS 58   ALT 15   AST 22   BILITOT 0.7         BMP:   Recent Labs   Lab 10/30/23  1934   *      K 3.8      CO2 26   BUN 13   CREATININE 1.1   CALCIUM 10.0   , CMP   Recent Labs   Lab 10/30/23  1934      K 3.8      CO2 26   *   BUN 13   CREATININE 1.1   CALCIUM 10.0   PROT 7.6   ALBUMIN 4.4   BILITOT 0.7   ALKPHOS 58   AST 22   ALT 15   ANIONGAP 8   , CBC   Recent Labs   Lab 10/30/23  1934   WBC 9.73 " "  HGB 12.9*   HCT 38.4*      , INR No results found for: "INR", "PROTIME", Lipid Panel No results found for: "CHOL", "HDL", "LDLCALC", "TRIG", "CHOLHDL", Troponin No results for input(s): "TROPONINI" in the last 168 hours., A1C: No results for input(s): "HGBA1C" in the last 4320 hours., and All labs within the past 24 hours have been reviewed  Microbiology Results (last 7 days)       ** No results found for the last 168 hours. **          CT Abdomen Pelvis With IV Contrast   Final Result      CT Abdomen Pelvis With IV Contrast    Result Date: 10/30/2023  CT ABDOMEN PELVIS WITH IV CONTRAST COMPARISONS:  none. ADDITIONAL PERTINENT HISTORY:   Flank pain with possible kidney stone TECHNIQUE:   Multiple axial images were obtained from the lung bases through the lesser trochanters after the adminstration of intravenous contrast.  One of the following dose optimization techniques was utilized in the performance of this exam: Automated exposure control; adjustment of the mA and/or kV according to the patient's size; or use of an iterative  reconstruction technique.  Specific details can be referenced in the facility's radiology CT exam operational policy. CONTRAST:   100 mL of IV Omnipaque 350. FINDINGS: Lung bases: negative Free air/free fluid: Small amount of free fluid within the inferior aspects of the left retroperitoneum and extending along the left pelvic sidewall. Liver:  negative Spleen: negative Adrenal glands: negative Kidneys /ureters/urinary bladder: negative Pancreas: negative Gall Bladder:   negative Bowel / mesentery: Negative including a normal-appearing appendix in the right lower quadrant. Lymph node assessment: negative Pelvic contents: negative Abdominal vasculature: Atherosclerotic disease of the abdominal aorta and its major branches. Surrounding soft tissues: Enlargement of the left iliopsoas musculature is well as the left pelvic sidewall musculature compatible with intramuscular hematomas. " Osseous structures: negative IMPRESSION: 1. Iliopsoas and left pelvic side wall intramuscular hematomas with a small amount of what is likely hemorrhage within the left retroperitoneum. No active extravasation of contrast noted at the time of the CT scan.. 2. No other acute intra-abdominal or intrapelvic process. Electronically signed by:  Luke Britt MD  10/30/2023 08:42 PM CDT Workstation: [id number]        I personally revlewed and agree with the radiologist findings      ASSESSMENT & PLAN:   Jos Espino is a 68 y.o. male admitted for      Iliopsoas and left pelvic side wall intramuscular hematomas with a small amount of what is likely hemorrhage within the left retroperitoneum  - General Surgery consult  - pain management  - trend H&H  -strong posterior tibiotalar and dorsalis pedis pulses bilaterally  - Monitor for worsening bleeding or swelling    2. Essential HTN  - PRN Hydralazine  or higher    3. Hyperlipidemia  - continue statin          DVT Prophylaxis: will be placed on encourage ambulation for DVT prophylaxis and will be advised to be as mobile as possible and sit in a chair as tolerated.   ________________________________________________________________  Face-to-Face encounter date: 10/31/2023  Encounter included review of the medical records, interviewing and examining the patient face-to-face, discussion with family and other health care providers including emergency medicine physician, admission orders, interpreting lab/test results and formulating a plan of care.   Medical Decision Making during this encounter was  [_] Low Complexity  [_] Moderate Complexity  [x] High Complexity  _________________________________________________________________________________    INPATIENT LIST OF MEDICATIONS   No current facility-administered medications for this encounter.    Current Outpatient Medications:     ascorbic acid, vitamin C, (VITAMIN C) 1000 MG tablet, Take 1 tablet by mouth once daily.,  Disp: , Rfl:     atorvastatin (LIPITOR) 10 MG tablet, Take 10 mg by mouth once daily., Disp: , Rfl:     cholecalciferol, vitamin D3, (VITAMIN D3) 50 mcg (2,000 unit) Cap, Take 1 capsule by mouth once daily., Disp: , Rfl:     CLENPIQ 10 mg-3.5 gram- 12 gram/160 mL Soln, Take by mouth., Disp: , Rfl:     hydroquinone 4 % Crea, Apply 1 application  topically 2 (two) times daily., Disp: , Rfl:     tadalafiL (CIALIS) 5 MG tablet, Take 1 tablet (5 mg total) by mouth daily as needed for Erectile Dysfunction., Disp: 30 tablet, Rfl: 11      Scheduled Meds:  Continuous Infusions:  PRN Meds:.      Karuna Montiel  [facility] Hospitalist NP  10/31/2023

## 2023-10-31 NOTE — CARE UPDATE
10/31/23 1050   Patient Assessment/Suction   Level of Consciousness (AVPU) alert   PRE-TX-O2   Device (Oxygen Therapy) room air   SpO2 97 %   Pulse Oximetry Type Intermittent   $ Pulse Oximetry - Multiple Charge Pulse Oximetry - Multiple   Education   $ Education Other (see comment);15 min

## 2023-10-31 NOTE — PLAN OF CARE
[facility]  Initial Discharge Assessment       Primary Care Provider: Abiel Salguero MD    Admission Diagnosis: Retroperitoneal hemorrhage [R58]    Admission Date: 10/30/2023  Expected Discharge Date:     Transition of Care Barriers: None     completed discharge assessment with Pt at bedside. Pt AAOx4s. Pt lives at address in chart. Demographics, PCP, and insurance verified. No home health. No dialysis. Pt completes ADLs without assistance. Pt to discharge home via family transport. Pt has no other needs to be addressed at this time. CM will continue to follow.     Payor: [email or web address] MANAGED MEDICARE / Plan: [employer] MEDICARE PPO / Product Type: Medicare Advantage /     Extended Emergency Contact Information  Primary Emergency Contact: Blanca Espino   [facility]  Home Phone: [phone]  Relation: Other    Discharge Plan A: Home with family  Discharge Plan B: Home with family      [facility] - [place], [place] - [address]  [address] [address]  Phone: [phone] Fax: [phone]    [id number] - [place], [place] - [address]  [address] [address]  Phone: [phone] Fax: [phone]      Initial Assessment (most recent)       Adult Discharge Assessment - 10/31/23 1608          Discharge Assessment    Assessment Type Discharge Planning Assessment     Confirmed/corrected address, phone number and insurance Yes     Confirmed Demographics Correct on Facesheet     Source of Information patient     Does patient/caregiver understand observation status Yes     Reason For Admission Retroperitoneal hemorrhage     People in Home spouse     Facility Arrived From: home     Do you expect to return to your current living situation? Yes     Do you have help at home or someone to help you manage your care at home? Yes     Who are your caregiver(s) and their phone number(s)? Blanca Espino (Other)   [phone] (Home Phone)     Prior to  hospitilization cognitive status: Unable to Assess     Current cognitive status: Alert/Oriented     Equipment Currently Used at Home none     Readmission within 30 days? No     Patient currently being followed by outpatient case management? No     Do you currently have service(s) that help you manage your care at home? No     Do you take prescription medications? Yes     Do you have prescription coverage? Yes     Coverage [employer] MANAGED MEDICARE - [employer] MEDICARE PPO     Do you have any problems affording any of your prescribed medications? No     Is the patient taking medications as prescribed? yes     Who is going to help you get home at discharge? Blanca Espino (Other)   [phone] (Home Phone)     How do you get to doctors appointments? car, drives self     Are you on dialysis? No     Do you take coumadin? No     DME Needed Upon Discharge  none     Discharge Plan discussed with: Patient;Spouse/sig other     Name(s) and Number(s) Blanca Espino (Other)   [phone] (Home Phone)     Transition of Care Barriers None     Discharge Plan A Home with family     Discharge Plan B Home with family

## 2023-10-31 NOTE — CONSULTS
Critical access hospital  General Surgery  Consult Note    Patient Name: Jos Espino  MRN: [id number]  Code Status: Full Code  Admission Date: 10/30/2023  Hospital Length of Stay: 0 days  Attending Physician: Wong Taylor DO  Primary Care Provider: Abiel Salguero MD    Patient information was obtained from patient and ER records.     Inpatient consult to General surgery  Consult performed by: Melba Farias MD  Consult ordered by: Blaise Hanley MD  Reason for consult: retroperitoneal hematoma  Assessment/Recommendations: observation        Subjective:     Principal Problem: Retroperitoneal hemorrhage    History of Present Illness: 67 y/o male presents with left-sided abdominal pain radiating down to his leg.  He does note some left thigh and knee numbness as well.  He notes this has been present starting over about 24-48 hrs.  He denies any traumatic events and does not take any blood thinners.  He does report he has been exercising more and specifically doing leg presses.      No current facility-administered medications on file prior to encounter.     Current Outpatient Medications on File Prior to Encounter   Medication Sig    atorvastatin (LIPITOR) 10 MG tablet Take 10 mg by mouth once daily.    hydroquinone 4 % Crea Apply 1 application  topically 2 (two) times daily.    multivit-min-FA-lycopen-lutein (CENTRUM SILVER) 0.4 mg-300 mcg- 250 mcg Tab Take 1 tablet by mouth once daily.    tadalafiL (CIALIS) 5 MG tablet Take 1 tablet (5 mg total) by mouth daily as needed for Erectile Dysfunction.    [DISCONTINUED] ascorbic acid, vitamin C, (VITAMIN C) 1000 MG tablet Take 1 tablet by mouth once daily.    [DISCONTINUED] cholecalciferol, vitamin D3, (VITAMIN D3) 50 mcg (2,000 unit) Cap Take 1 capsule by mouth once daily.    [DISCONTINUED] CLENPIQ 10 mg-3.5 gram- 12 gram/160 mL Soln Take by mouth.       Review of patient's allergies indicates:  No Known Allergies    Past Medical History:   Diagnosis  Date    Hyperlipidemia      Past Surgical History:   Procedure Laterality Date    TRANSURETHRAL RESECTION OF PROSTATE N/A 4/7/2022    Procedure: TURP (TRANSURETHRAL RESECTION OF PROSTATE);  Surgeon: Ricky Rousseau Jr., MD;  Location: [facility];  Service: Urology;  Laterality: N/A;     Family History       Problem Relation (Age of Onset)    Cancer Brother    Hypertension Brother          Tobacco Use    Smoking status: Never    Smokeless tobacco: Never   Substance and Sexual Activity    Alcohol use: Yes     Comment: seldom    Drug use: No    Sexual activity: Yes     Partners: Female     Birth control/protection: None     Review of Systems   Constitutional:  Negative for chills, fatigue and fever.   HENT:  Negative for congestion, sore throat and trouble swallowing.    Respiratory:  Negative for cough and shortness of breath.    Cardiovascular:  Negative for chest pain.   Gastrointestinal:  Positive for abdominal pain. Negative for diarrhea, nausea and vomiting.   Musculoskeletal:  Negative for arthralgias, back pain and gait problem.   Skin:  Negative for rash.   Neurological:  Positive for numbness. Negative for dizziness, facial asymmetry and headaches.        Leg pain   Hematological:  Negative for adenopathy.   Psychiatric/Behavioral:  Negative for agitation, behavioral problems and confusion.    All other systems reviewed and are negative.    Objective:     Vital Signs (Most Recent):  Temp: 98.1 °F (36.7 °C) (10/31/23 1129)  Pulse: 65 (10/31/23 1129)  Resp: 11 (10/31/23 0800)  BP: (!) 166/79 (10/31/23 1129)  SpO2: 97 % (10/31/23 1129) Vital Signs (24h Range):  Temp:  [97.8 °F (36.6 °C)-98.1 °F (36.7 °C)] 98.1 °F (36.7 °C)  Pulse:  [58-96] 65  Resp:  [10-23] 11  SpO2:  [95 %-100 %] 97 %  BP: (129-205)/() 166/79     Weight: 81.6 kg (180 lb)  Body mass index is 25.83 kg/m².     Physical Exam       I have reviewed all pertinent lab results within the past 24 hours.  CBC:   Recent Labs   Lab 10/31/23  2506    WBC 12.73*   RBC 4.40*   HGB 12.3*   HCT 35.4*      MCV 81*   MCH 28.0   MCHC 34.7     CMP:   Recent Labs   Lab 10/31/23  0421   *   CALCIUM 9.9   ALBUMIN 4.4   PROT 7.7      K 4.2   CO2 25      BUN 11   CREATININE 1.1   ALKPHOS 56   ALT 15   AST 18   BILITOT 0.9       Significant Diagnostics:  I have reviewed all pertinent imaging results/findings within the past 24 hours.  CT: I have reviewed all pertinent results/findings within the past 24 hours and my personal findings are:  retroperitoneal hematoma on left      Assessment/Plan:     * Retroperitoneal hemorrhage  Not on blood thinner  Not clear cause, per haps exercising?  No surgical intervention for hematoma currently, monitor blood levels over the next 24 hours  Consult neurology for leg numbness- perhaps related to hematoma and femoral nerve?  No gross motor deficits     Spoke with primary team      VTE Risk Mitigation (From admission, onward)         Ordered     IP VTE LOW RISK PATIENT  Once         10/31/23 0209                Thank you for your consult. I will follow-up with patient. Please contact us if you have any additional questions.    Melba Farias MD  General Surgery  [facility]

## 2023-10-31 NOTE — SUBJECTIVE & OBJECTIVE
No current facility-administered medications on file prior to encounter.     Current Outpatient Medications on File Prior to Encounter   Medication Sig    atorvastatin (LIPITOR) 10 MG tablet Take 10 mg by mouth once daily.    hydroquinone 4 % Crea Apply 1 application  topically 2 (two) times daily.    multivit-min-FA-lycopen-lutein (CENTRUM SILVER) 0.4 mg-300 mcg- 250 mcg Tab Take 1 tablet by mouth once daily.    tadalafiL (CIALIS) 5 MG tablet Take 1 tablet (5 mg total) by mouth daily as needed for Erectile Dysfunction.    [DISCONTINUED] ascorbic acid, vitamin C, (VITAMIN C) 1000 MG tablet Take 1 tablet by mouth once daily.    [DISCONTINUED] cholecalciferol, vitamin D3, (VITAMIN D3) 50 mcg (2,000 unit) Cap Take 1 capsule by mouth once daily.    [DISCONTINUED] CLENPIQ 10 mg-3.5 gram- 12 gram/160 mL Soln Take by mouth.       Review of patient's allergies indicates:  No Known Allergies    Past Medical History:   Diagnosis Date    Hyperlipidemia      Past Surgical History:   Procedure Laterality Date    TRANSURETHRAL RESECTION OF PROSTATE N/A 4/7/2022    Procedure: TURP (TRANSURETHRAL RESECTION OF PROSTATE);  Surgeon: Ricky Rousseau Jr., MD;  Location: [facility];  Service: Urology;  Laterality: N/A;     Family History       Problem Relation (Age of Onset)    Cancer Brother    Hypertension Brother          Tobacco Use    Smoking status: Never    Smokeless tobacco: Never   Substance and Sexual Activity    Alcohol use: Yes     Comment: seldom    Drug use: No    Sexual activity: Yes     Partners: Female     Birth control/protection: None     Review of Systems   Constitutional:  Negative for chills, fatigue and fever.   HENT:  Negative for congestion, sore throat and trouble swallowing.    Respiratory:  Negative for cough and shortness of breath.    Cardiovascular:  Negative for chest pain.   Gastrointestinal:  Positive for abdominal pain. Negative for diarrhea, nausea and vomiting.   Musculoskeletal:  Negative for  arthralgias, back pain and gait problem.   Skin:  Negative for rash.   Neurological:  Positive for numbness. Negative for dizziness, facial asymmetry and headaches.        Leg pain   Hematological:  Negative for adenopathy.   Psychiatric/Behavioral:  Negative for agitation, behavioral problems and confusion.    All other systems reviewed and are negative.    Objective:     Vital Signs (Most Recent):  Temp: 98.1 °F (36.7 °C) (10/31/23 1129)  Pulse: 65 (10/31/23 1129)  Resp: 11 (10/31/23 0800)  BP: (!) 166/79 (10/31/23 1129)  SpO2: 97 % (10/31/23 1129) Vital Signs (24h Range):  Temp:  [97.8 °F (36.6 °C)-98.1 °F (36.7 °C)] 98.1 °F (36.7 °C)  Pulse:  [58-96] 65  Resp:  [10-23] 11  SpO2:  [95 %-100 %] 97 %  BP: (129-205)/() 166/79     Weight: 81.6 kg (180 lb)  Body mass index is 25.83 kg/m².     Physical Exam       I have reviewed all pertinent lab results within the past 24 hours.  CBC:   Recent Labs   Lab 10/31/23  0421   WBC 12.73*   RBC 4.40*   HGB 12.3*   HCT 35.4*      MCV 81*   MCH 28.0   MCHC 34.7     CMP:   Recent Labs   Lab 10/31/23  0421   *   CALCIUM 9.9   ALBUMIN 4.4   PROT 7.7      K 4.2   CO2 25      BUN 11   CREATININE 1.1   ALKPHOS 56   ALT 15   AST 18   BILITOT 0.9       Significant Diagnostics:  I have reviewed all pertinent imaging results/findings within the past 24 hours.  CT: I have reviewed all pertinent results/findings within the past 24 hours and my personal findings are:  retroperitoneal hematoma on left

## 2023-10-31 NOTE — PHARMACY MED REC
"              .        Admission Medication History     The home medication history was taken by Lucy Ghosh.    You may go to "Admission" then "Reconcile Home Medications" tabs to review and/or act upon these items.     The home medication list has been updated by the Pharmacy department.   Please read ALL comments highlighted in yellow.   Please address this information as you see fit.    Feel free to contact us if you have any questions or require assistance.      The medications listed below were removed from the home medication list. Please reorder if appropriate:  Patient reports no longer taking the following medication(s):  Clenpiq  Vitamin C  Vitamin D3    Medications listed below were obtained from: Patient/family and Analytic software- [employer]  No current facility-administered medications on file prior to encounter.     Current Outpatient Medications on File Prior to Encounter   Medication Sig Dispense Refill    atorvastatin (LIPITOR) 10 MG tablet Take 10 mg by mouth once daily.      hydroquinone 4 % Crea Apply 1 application  topically 2 (two) times daily.      multivit-min-FA-lycopen-lutein (CENTRUM SILVER) 0.4 mg-300 mcg- 250 mcg Tab Take 1 tablet by mouth once daily.      tadalafiL (CIALIS) 5 MG tablet Take 1 tablet (5 mg total) by mouth daily as needed for Erectile Dysfunction. 30 tablet 11    [DISCONTINUED] ascorbic acid, vitamin C, (VITAMIN C) 1000 MG tablet Take 1 tablet by mouth once daily.      [DISCONTINUED] cholecalciferol, vitamin D3, (VITAMIN D3) 50 mcg (2,000 unit) Cap Take 1 capsule by mouth once daily.      [DISCONTINUED] CLENPIQ 10 mg-3.5 gram- 12 gram/160 mL Soln Take by mouth.             Lucy Ghosh  EXT 1924      "

## 2023-10-31 NOTE — PLAN OF CARE
Pt was explained ARNOLD. Pt verbalized understanding of ARNOLD and signed. ARNOLD scanned to .    10/31/23 1612   ARNOLD Message   Medicare Outpatient and Observation Notification regarding financial responsibility Given to patient/caregiver;Explained to patient/caregiver;Signed/date by patient/caregiver   Date ARNOLD was signed 10/31/23   Time ARNOLD was signed 1447

## 2023-10-31 NOTE — HPI
69 y/o male presents with left-sided abdominal pain radiating down to his leg.  He does note some left thigh and knee numbness as well.  He notes this has been present starting over about 24-48 hrs.  He denies any traumatic events and does not take any blood thinners.  He does report he has been exercising more and specifically doing leg presses.

## 2023-10-31 NOTE — ASSESSMENT & PLAN NOTE
Not on blood thinner  Not clear cause, per haps exercising?  No surgical intervention for hematoma currently, monitor blood levels over the next 24 hours  Consult neurology for leg numbness- perhaps related to hematoma and femoral nerve?  No gross motor deficits     Spoke with primary team

## 2023-10-31 NOTE — ED PROVIDER NOTES
Encounter Date: 10/30/2023       History     Chief Complaint   Patient presents with    Groin Pain     Left groin pain radiates from left flank down to left knee started yesterday     This is a 68-year-old male presenting with complaint of left pelvic/groin pain.  The pain began mid day yesterday.  It radiates to his left flank and down into his left thigh and is exacerbated with hip range of motion.  The pain is severe.  He denies any known inciting factors.  He denies any trauma or strenuous activity.  He denies any dysuria or hematuria or testicular pain or swelling.    The history is provided by the patient.     Review of patient's allergies indicates:  No Known Allergies  Past Medical History:   Diagnosis Date    Hyperlipidemia      Past Surgical History:   Procedure Laterality Date    TRANSURETHRAL RESECTION OF PROSTATE N/A 4/7/2022    Procedure: TURP (TRANSURETHRAL RESECTION OF PROSTATE);  Surgeon: Ricky Rousseau Jr., MD;  Location: [facility];  Service: Urology;  Laterality: N/A;     Family History   Problem Relation Age of Onset    Hypertension Brother     Cancer Brother         prostate     Social History     Tobacco Use    Smoking status: Never    Smokeless tobacco: Never   Substance Use Topics    Alcohol use: Yes     Comment: seldom    Drug use: No     Review of Systems   Genitourinary:  Positive for flank pain.   Musculoskeletal:  Positive for myalgias.   All other systems reviewed and are negative.      Physical Exam     Initial Vitals [10/30/23 1902]   BP Pulse Resp Temp SpO2   (!) 203/97 (!) 59 18 97.8 °F (36.6 °C) 98 %      MAP       --         Physical Exam    Nursing note and vitals reviewed.  Constitutional: He appears well-developed and well-nourished. He is not diaphoretic.   Appears uncomfortable   HENT:   Head: Normocephalic and atraumatic.   Eyes: Conjunctivae are normal.   Neck: Neck supple.   Normal range of motion.  Cardiovascular:  Normal rate.           Pulmonary/Chest: No respiratory  distress.   Abdominal: Abdomen is soft. He exhibits no distension. There is no abdominal tenderness.   Nontender abdomen   Musculoskeletal:         General: No edema.      Cervical back: Normal range of motion and neck supple.      Comments: Full hip range of motion with the pain.  No obvious thigh or groin swelling.  2+ femoral pulse.     Neurological: He is alert. He has normal strength.   Skin: Skin is warm and dry. No rash noted. No erythema.   Psychiatric: He has a normal mood and affect.         ED Course   Procedures  Labs Reviewed   CBC W/ AUTO DIFFERENTIAL - Abnormal; Notable for the following components:       Result Value    Hemoglobin 12.9 (*)     Hematocrit 38.4 (*)     MPV 13.5 (*)     Lymph % 17.6 (*)     All other components within normal limits   COMPREHENSIVE METABOLIC PANEL - Abnormal; Notable for the following components:    Glucose 135 (*)     All other components within normal limits   URINALYSIS, REFLEX TO URINE CULTURE    Narrative:     Specimen Source->Urine   ISTAT CREATININE   POCT CREATININE          Imaging Results              CT Abdomen Pelvis With IV Contrast (Final result)  Result time 10/30/23 20:42:37      Final result by Luke Britt MD (10/30/23 20:42:37)                   Narrative:    CT ABDOMEN PELVIS WITH IV CONTRAST    COMPARISONS:  none.    ADDITIONAL PERTINENT HISTORY:   Flank pain with possible kidney stone    TECHNIQUE:   Multiple axial images were obtained from the lung bases through the lesser trochanters after the adminstration of intravenous contrast.  One of the following dose optimization techniques was utilized in the performance of this exam: Automated exposure control; adjustment of the mA and/or kV according to the patient's size; or use of an iterative  reconstruction technique.  Specific details can be referenced in the facility's radiology CT exam operational policy.    CONTRAST:   100 mL of IV Omnipaque 350.    FINDINGS:  Lung bases: negative    Free  air/free fluid: Small amount of free fluid within the inferior aspects of the left retroperitoneum and extending along the left pelvic sidewall.  Liver:  negative  Spleen: negative  Adrenal glands: negative  Kidneys /ureters/urinary bladder: negative  Pancreas: negative  Gall Bladder:   negative    Bowel / mesentery: Negative including a normal-appearing appendix in the right lower quadrant.    Lymph node assessment: negative    Pelvic contents: negative  Abdominal vasculature: Atherosclerotic disease of the abdominal aorta and its major branches.    Surrounding soft tissues: Enlargement of the left iliopsoas musculature is well as the left pelvic sidewall musculature compatible with intramuscular hematomas.  Osseous structures: negative      IMPRESSION:    1. Iliopsoas and left pelvic side wall intramuscular hematomas with a small amount of what is likely hemorrhage within the left retroperitoneum. No active extravasation of contrast noted at the time of the CT scan..  2. No other acute intra-abdominal or intrapelvic process.    Electronically signed by:  Luke Britt MD  10/30/2023 08:42 PM CDT Workstation: [id number]                                     Medications   oxyCODONE-acetaminophen 5-325 mg per tablet 1 tablet (1 tablet Oral Given 10/30/23 1936)   ondansetron injection 4 mg (4 mg Intravenous Given 10/30/23 1936)   iohexoL (OMNIPAQUE 350) injection 100 mL (100 mLs Intravenous Given 10/30/23 2030)   HYDROmorphone injection 1 mg (1 mg Intravenous Given 10/31/23 0026)   ondansetron injection 4 mg (4 mg Intravenous Given 10/31/23 0022)     Medical Decision Making  68-year-old with left groin and flank pain since yesterday.  CT was obtained which shows left iliopsoas and left pelvic sidewall intramuscular hematomas, with a small amount retroperitoneal hemorrhage, no evidence for active extravasation.  Hemoglobin is 12.9.  Serum chemistries unremarkable.  Patient is not on anticoagulants, denies any trauma or  recent procedure.  He was quite hypotensive, says it is due to his pain.  I have ordered IV Dilaudid.  I discussed with General surgery, Dr. Farias, who will see in consultation in the morning.  Hospitalist has been consulted for admission.    Risk  Prescription drug management.                               Clinical Impression:   Final diagnoses:  [R58] Retroperitoneal hemorrhage (Primary)        ED Disposition Condition    Observation Stable                HarborcreekBlaise ramos MD  10/31/23 0038

## 2023-10-31 NOTE — CONSULTS
[facility]  Department of Neurology  Neurology Consultation Note        PATIENT NAME: Jos Espino  MRN: [id number]  CSN: [id number]      TODAY'S DATE: 10/31/2023  ADMIT DATE: 10/30/2023                            CONSULTING PROVIDER: Johnathon Reddy MD  CONSULT REQUESTED BY: Wong Taylor DO      Reason for consult: Numbness and weakness       History obtained from chart review and the patient.    HPI per EMR: Jos Espino is a 68 y.o. male with a history of  presents emergency room with left pelvis/groin pain.  The patient states the pain began yesterday.  He does admit to attending exercise classes that includes exercising his legs with weights about 4 to 5 times a week at the [employer] exercise gym.  But he denied actual trauma.  The patient states his left thigh pain became excruciating with minimal range of motion so he came to emergency room for further evaluation        In the emergency room radiographic imaging revealed a Iliopsoas and left pelvic side wall intramuscular hematomas with a small amount of what is likely hemorrhage within the left retroperitoneum     General surgery was consulted and agreed to see the patient in consult.  The patient will be given pain management and monitored closely        Past medical history significant for hyperlipidemia ED and hypertension that is diet-controlled        The patient is not on anticoagulation and denies excessive NSAID use    Neurology consult:  Patient was seen and examined me.  States that he presented to the hospital for pain and weakness left groin/left lower extremity.  He states that it started yesterday and admits doing exercises with his legs.  Denies any recent trauma.  In the ER, he had a CT abdomen pelvis with contrast which revealed iliopsoas and left pelvic side intramuscular hematomas.  He is not been on any blood thinners.  General surgery consulted and recommended no surgical intervention.  He describes of numbness in the  anterior thigh and leg on the left side and associated weakness in his left lower extremity proximal greater than distal.  Neurology consulted for numbness and weakness.    PREVIOUS MEDICAL HISTORY:  Past Medical History:   Diagnosis Date    Hyperlipidemia      PREVIOUS SURGICAL HISTORY:  Past Surgical History:   Procedure Laterality Date    TRANSURETHRAL RESECTION OF PROSTATE N/A 4/7/2022    Procedure: TURP (TRANSURETHRAL RESECTION OF PROSTATE);  Surgeon: Ricky Rousseau Jr., MD;  Location: [facility];  Service: Urology;  Laterality: N/A;     FAMILY MEDICAL HISTORY:  Family History   Problem Relation Age of Onset    Hypertension Brother     Cancer Brother         prostate     SOCIAL HISTORY:  Social History     Tobacco Use    Smoking status: Never    Smokeless tobacco: Never   Substance Use Topics    Alcohol use: Yes     Comment: seldom    Drug use: No     ALLERGIES:  Review of patient's allergies indicates:  No Known Allergies  HOME MEDICATIONS:  Prior to Admission medications    Medication Sig Start Date End Date Taking? Authorizing Provider   atorvastatin (LIPITOR) 10 MG tablet Take 10 mg by mouth once daily.   Yes Provider, Historical   hydroquinone 4 % Crea Apply 1 application  topically 2 (two) times daily. 7/19/23  Yes Provider, Historical   multivit-min-FA-lycopen-lutein (CENTRUM SILVER) 0.4 mg-300 mcg- 250 mcg Tab Take 1 tablet by mouth once daily.   Yes Provider, Historical   tadalafiL (CIALIS) 5 MG tablet Take 1 tablet (5 mg total) by mouth daily as needed for Erectile Dysfunction. 6/5/23 6/4/24 Yes Ricky Rousseau Jr., MD   ascorbic acid, vitamin C, (VITAMIN C) 1000 MG tablet Take 1 tablet by mouth once daily. 1/1/22 10/31/23  Provider, Historical   cholecalciferol, vitamin D3, (VITAMIN D3) 50 mcg (2,000 unit) Cap Take 1 capsule by mouth once daily. 1/1/22 10/31/23  Provider, Historical   CLENPIQ 10 mg-3.5 gram- 12 gram/160 mL Soln Take by mouth. 2/21/23 10/31/23  Provider, Historical     CURRENT SCHEDULED  "MEDICATIONS:   atorvastatin  10 mg Oral Daily     CURRENT INFUSIONS:    CURRENT PRN MEDICATIONS:  acetaminophen, HYDROmorphone, melatonin, ondansetron, sodium chloride 0.9%    REVIEW OF SYSTEMS:  Please refer to the HPI for all pertinent positive and negative findings. A comprehensive review of all other systems was negative.       PHYSICAL EXAM:  Patient Vitals for the past 24 hrs:   BP Temp Temp src Pulse Resp SpO2 Height Weight   10/31/23 0800 (!) 143/67 -- -- 68 11 96 % -- --   10/31/23 0708 -- -- -- -- 20 -- -- --   10/31/23 0700 (!) 144/78 -- -- 67 13 96 % -- --   10/31/23 0600 138/61 -- -- 74 16 96 % -- --   10/31/23 0500 (!) 129/97 -- -- 92 19 99 % -- --   10/31/23 0400 (!) 142/80 -- -- 89 17 95 % -- --   10/31/23 0340 131/82 -- -- 90 11 95 % -- --   10/31/23 0330 133/80 -- -- 91 13 95 % -- --   10/31/23 0320 (!) 155/83 -- -- 90 13 95 % -- --   10/31/23 0310 (!) 143/78 -- -- -- -- -- -- --   10/31/23 0300 (!) 143/75 -- -- -- -- -- -- --   10/31/23 0250 (!) 148/68 -- -- 93 10 96 % -- --   10/31/23 0240 (!) 183/93 -- -- 96 -- -- -- --   10/31/23 0230 -- -- -- 93 -- -- -- --   10/31/23 0220 -- -- -- 76 15 98 % -- --   10/31/23 0218 -- -- -- -- 16 -- -- --   10/31/23 0200 (!) 166/82 -- -- 84 15 97 % -- --   10/31/23 0134 -- -- -- 80 19 97 % -- --   10/31/23 0130 (!) 184/88 -- -- 87 -- 97 % -- --   10/31/23 0110 (!) 198/105 -- -- 63 18 100 % -- --   10/31/23 0100 (!) 198/105 -- -- 72 -- 98 % -- --   10/31/23 0030 (!) 194/92 -- -- 70 (!) 23 98 % -- --   10/31/23 0026 -- -- -- (!) 59 15 98 % -- --   10/31/23 0015 (!) 205/99 -- -- (!) 58 -- 99 % -- --   10/30/23 1936 -- -- -- -- 18 -- -- --   10/30/23 1902 (!) 203/97 97.8 °F (36.6 °C) Oral (!) 59 18 98 % 5' 10" (1.778 m) 81.6 kg (180 lb)       GENERAL APPEARANCE: Alert, well-developed, well-nourished male in no acute distress.  HEENT: Normocephalic and atraumatic. PERRL. Oropharynx unremarkable.  PULM: Normal respiratory effort. No accessory muscle use.  CV: " "RRR.  ABDOMEN: Soft, nontender.  EXTREMITIES: No obvious signs of vascular compromise. Pulses present. No cyanosis, clubbing or edema.  SKIN: Clear; no rashes, lesions or skin breaks in exposed areas.    NEURO:  MENTAL STATUS: Patient awake and oriented to time, place, and person, recent/remote memory normal, attention span/concentration normal, and speech fluent without paraphasic errors.  Affect euthymic.    CRANIAL NERVES:  CN I: Not tested.  CN II: Fundoscopic exam deferred.  CN III, IV, VI: Pupils equal, round and reactive to light.  Extraocular movements full and intact.  CN V: Facial sensation normal.  CN VII: Facial asymmetry absent.  CN VIII: Hearing grossly normal and equal bilaterally.  No skew deviation or pathologic nystagmus.  CN IX, X: Palate elevates symmetrically. Speech/articulation is clear without dysarthria.  CN XI: Shoulder shrug and chin rotation equal with good strength.  CN XII: Tongue protrusion midline.    MOTOR:  Bulk normal. Tone normal and symmetric throughout.  Abnormal movements absent.  Tremor: none present.  Strength  5/5 in all extremities except left lower extremity proximally 2/5, knee flexion and extension 3+/5, dorsiflexion and plantar flexion in the ankle 4/5. .    REFLEXES:  DTRs 1+ throughout have sudden left patella.  Plantar response equivocal bilaterally.  SENSATION:  Decreased light touch on the left anterior thigh and anterior leg. .  COORDINATION:  Intact .  STATION: not tested.  GAIT: not tested.        Labs:  Recent Labs   Lab 10/30/23  1934 10/31/23  0421    137   K 3.8 4.2    105   CO2 26 25   BUN 13 11   CREATININE 1.1 1.1   * 153*   CALCIUM 10.0 9.9     Recent Labs   Lab 10/30/23  1934 10/31/23  0421   WBC 9.73 12.73*   HGB 12.9* 12.3*   HCT 38.4* 35.4*    189     Recent Labs   Lab 10/30/23  1934 10/31/23  0421   ALBUMIN 4.4 4.4   PROT 7.6 7.7   BILITOT 0.7 0.9   ALKPHOS 58 56   ALT 15 15   AST 22 18     No results found for: "PT", " ""PTT", "INR"  No results found for: "CHOLTOT", "TRIG", "HDL", "CHOLHDL", "LDL"  No results found for: "HGBA1C"  No results found for: "PROTEINCSF", "GLUCCSF", "WBCCSF"    Imaging:  I have reviewed and interpreted the pertinent imaging and lab results.      CT Abdomen Pelvis With IV Contrast  CT ABDOMEN PELVIS WITH IV CONTRAST    COMPARISONS:  none.    ADDITIONAL PERTINENT HISTORY:   Flank pain with possible kidney stone    TECHNIQUE:   Multiple axial images were obtained from the lung bases through the lesser trochanters after the adminstration of intravenous contrast.  One of the following dose optimization techniques was utilized in the performance of this exam: Automated exposure control; adjustment of the mA and/or kV according to the patient's size; or use of an iterative  reconstruction technique.  Specific details can be referenced in the facility's radiology CT exam operational policy.    CONTRAST:   100 mL of IV Omnipaque 350.    FINDINGS:  Lung bases: negative    Free air/free fluid: Small amount of free fluid within the inferior aspects of the left retroperitoneum and extending along the left pelvic sidewall.  Liver:  negative  Spleen: negative  Adrenal glands: negative  Kidneys /ureters/urinary bladder: negative  Pancreas: negative  Gall Bladder:   negative    Bowel / mesentery: Negative including a normal-appearing appendix in the right lower quadrant.    Lymph node assessment: negative    Pelvic contents: negative  Abdominal vasculature: Atherosclerotic disease of the abdominal aorta and its major branches.    Surrounding soft tissues: Enlargement of the left iliopsoas musculature is well as the left pelvic sidewall musculature compatible with intramuscular hematomas.  Osseous structures: negative    IMPRESSION:    1. Iliopsoas and left pelvic side wall intramuscular hematomas with a small amount of what is likely hemorrhage within the left retroperitoneum. No active extravasation of contrast noted at " the time of the CT scan..  2. No other acute intra-abdominal or intrapelvic process.    Electronically signed by:  Luke Britt MD  10/30/2023 08:42 PM CDT Workstation: [id number]         ASSESSMENT & PLAN:      Numbness and weakness of left lower extremity  Intramuscular and retroperitoneal hematoma      Plan:   Patient presented with numbness/pain and weakness in his left lower extremity.  On exam, he has numbness in the anterior femoral cutaneous nerve and saphenous nerve distribution and some weakness in hip flexion/extension, knee flexion and extension.  Symptoms likely secondary to entrapment neuropathy in the setting of hematoma  Management of intramuscular/retroperitoneal hematoma per General surgery and primary team.  Conservative management for entrapment neuropathy with physical and occupational therapy including stretching and strengthening exercises  Will follow             Thank you kindly for including us in the care of this patient. Please do not hesitate to contact us with any questions.             Johnathon Reddy MD  Neurology/vascular Neurology  Date of Service: 10/31/2023  10:03 AM    --------------------------------------------------------------------------------------------------------------------------------------------------------------------------------------------------------------------------------------------------------------  Please note: This note was transcribed using voice recognition software. Because of this technology there are often uinintended grammatical, spelling, and other transcription errors. Please disregard these errors.

## 2023-11-01 ENCOUNTER — ANESTHESIA (OUTPATIENT)
Dept: SURGERY | Facility: HOSPITAL | Age: 68
DRG: 500 | End: 2023-11-01
Payer: MEDICARE

## 2023-11-01 ENCOUNTER — ANESTHESIA EVENT (OUTPATIENT)
Dept: SURGERY | Facility: HOSPITAL | Age: 68
DRG: 500 | End: 2023-11-01
Payer: MEDICARE

## 2023-11-01 PROBLEM — M79.81 NONTRAUMATIC PSOAS HEMATOMA: Status: ACTIVE | Noted: 2023-10-31

## 2023-11-01 LAB
ALBUMIN SERPL BCP-MCNC: 4.2 G/DL (ref 3.5–5.2)
ALP SERPL-CCNC: 53 U/L (ref 55–135)
ALT SERPL W/O P-5'-P-CCNC: 17 U/L (ref 10–44)
ANION GAP SERPL CALC-SCNC: 5 MMOL/L (ref 8–16)
AST SERPL-CCNC: 30 U/L (ref 10–40)
BASOPHILS # BLD AUTO: 0.04 K/UL (ref 0–0.2)
BASOPHILS NFR BLD: 0.3 % (ref 0–1.9)
BILIRUB SERPL-MCNC: 1.2 MG/DL (ref 0.1–1)
BUN SERPL-MCNC: 14 MG/DL (ref 8–23)
CALCIUM SERPL-MCNC: 9.8 MG/DL (ref 8.7–10.5)
CHLORIDE SERPL-SCNC: 102 MMOL/L (ref 95–110)
CO2 SERPL-SCNC: 30 MMOL/L (ref 23–29)
CREAT SERPL-MCNC: 1 MG/DL (ref 0.5–1.4)
DIFFERENTIAL METHOD: ABNORMAL
EOSINOPHIL # BLD AUTO: 0.1 K/UL (ref 0–0.5)
EOSINOPHIL NFR BLD: 0.6 % (ref 0–8)
ERYTHROCYTE [DISTWIDTH] IN BLOOD BY AUTOMATED COUNT: 12.7 % (ref 11.5–14.5)
ERYTHROCYTE [DISTWIDTH] IN BLOOD BY AUTOMATED COUNT: 12.9 % (ref 11.5–14.5)
EST. GFR  (NO RACE VARIABLE): >60 ML/MIN/1.73 M^2
GLUCOSE SERPL-MCNC: 120 MG/DL (ref 70–110)
HCT VFR BLD AUTO: 30.2 % (ref 40–54)
HCT VFR BLD AUTO: 34.5 % (ref 40–54)
HGB BLD-MCNC: 10.2 G/DL (ref 14–18)
HGB BLD-MCNC: 11.6 G/DL (ref 14–18)
IMM GRANULOCYTES # BLD AUTO: 0.05 K/UL (ref 0–0.04)
IMM GRANULOCYTES NFR BLD AUTO: 0.4 % (ref 0–0.5)
LYMPHOCYTES # BLD AUTO: 1 K/UL (ref 1–4.8)
LYMPHOCYTES NFR BLD: 8 % (ref 18–48)
MCH RBC QN AUTO: 27.5 PG (ref 27–31)
MCH RBC QN AUTO: 27.6 PG (ref 27–31)
MCHC RBC AUTO-ENTMCNC: 33.6 G/DL (ref 32–36)
MCHC RBC AUTO-ENTMCNC: 33.8 G/DL (ref 32–36)
MCV RBC AUTO: 81 FL (ref 82–98)
MCV RBC AUTO: 82 FL (ref 82–98)
MONOCYTES # BLD AUTO: 1.6 K/UL (ref 0.3–1)
MONOCYTES NFR BLD: 12 % (ref 4–15)
NEUTROPHILS # BLD AUTO: 10.2 K/UL (ref 1.8–7.7)
NEUTROPHILS NFR BLD: 78.7 % (ref 38–73)
NRBC BLD-RTO: 0 /100 WBC
PLATELET # BLD AUTO: 180 K/UL (ref 150–450)
PLATELET # BLD AUTO: 206 K/UL (ref 150–450)
PMV BLD AUTO: 12.8 FL (ref 9.2–12.9)
PMV BLD AUTO: 13.2 FL (ref 9.2–12.9)
POTASSIUM SERPL-SCNC: 3.9 MMOL/L (ref 3.5–5.1)
PROT SERPL-MCNC: 7.5 G/DL (ref 6–8.4)
RBC # BLD AUTO: 3.71 M/UL (ref 4.6–6.2)
RBC # BLD AUTO: 4.2 M/UL (ref 4.6–6.2)
SODIUM SERPL-SCNC: 137 MMOL/L (ref 136–145)
WBC # BLD AUTO: 12.9 K/UL (ref 3.9–12.7)
WBC # BLD AUTO: 15.86 K/UL (ref 3.9–12.7)

## 2023-11-01 PROCEDURE — 25000003 PHARM REV CODE 250

## 2023-11-01 PROCEDURE — 80053 COMPREHEN METABOLIC PANEL: CPT | Performed by: NURSE PRACTITIONER

## 2023-11-01 PROCEDURE — 88305 TISSUE EXAM BY PATHOLOGIST: CPT | Mod: TC | Performed by: PATHOLOGY

## 2023-11-01 PROCEDURE — 49060 DRAIN OPEN RETROPERI ABSCESS: CPT | Mod: ,,, | Performed by: SURGERY

## 2023-11-01 PROCEDURE — 63600175 PHARM REV CODE 636 W HCPCS: Performed by: SURGERY

## 2023-11-01 PROCEDURE — 63600175 PHARM REV CODE 636 W HCPCS: Performed by: ANESTHESIOLOGY

## 2023-11-01 PROCEDURE — 36000704 HC OR TIME LEV I 1ST 15 MIN: Performed by: SURGERY

## 2023-11-01 PROCEDURE — 37000008 HC ANESTHESIA 1ST 15 MINUTES: Performed by: SURGERY

## 2023-11-01 PROCEDURE — D9220A PRA ANESTHESIA: Mod: CRNA,,, | Performed by: NURSE ANESTHETIST, CERTIFIED REGISTERED

## 2023-11-01 PROCEDURE — 25000003 PHARM REV CODE 250: Performed by: NURSE PRACTITIONER

## 2023-11-01 PROCEDURE — 63600175 PHARM REV CODE 636 W HCPCS: Performed by: STUDENT IN AN ORGANIZED HEALTH CARE EDUCATION/TRAINING PROGRAM

## 2023-11-01 PROCEDURE — 25000003 PHARM REV CODE 250: Performed by: SURGERY

## 2023-11-01 PROCEDURE — 20205 DEEP MUSCLE BIOPSY: CPT | Mod: 51,,, | Performed by: SURGERY

## 2023-11-01 PROCEDURE — 36415 COLL VENOUS BLD VENIPUNCTURE: CPT | Performed by: SURGERY

## 2023-11-01 PROCEDURE — 49060 PR DRAIN RETROPERITONEAL ABSCESS,OPEN: ICD-10-PCS | Mod: ,,, | Performed by: SURGERY

## 2023-11-01 PROCEDURE — 63600175 PHARM REV CODE 636 W HCPCS: Performed by: INTERNAL MEDICINE

## 2023-11-01 PROCEDURE — D9220A PRA ANESTHESIA: ICD-10-PCS | Mod: CRNA,,, | Performed by: NURSE ANESTHETIST, CERTIFIED REGISTERED

## 2023-11-01 PROCEDURE — D9220A PRA ANESTHESIA: Mod: ANES,,, | Performed by: ANESTHESIOLOGY

## 2023-11-01 PROCEDURE — 85025 COMPLETE CBC W/AUTO DIFF WBC: CPT | Performed by: NURSE PRACTITIONER

## 2023-11-01 PROCEDURE — 63600175 PHARM REV CODE 636 W HCPCS: Performed by: NURSE PRACTITIONER

## 2023-11-01 PROCEDURE — 21400001 HC TELEMETRY ROOM

## 2023-11-01 PROCEDURE — 37000009 HC ANESTHESIA EA ADD 15 MINS: Performed by: SURGERY

## 2023-11-01 PROCEDURE — 63600175 PHARM REV CODE 636 W HCPCS: Performed by: NURSE ANESTHETIST, CERTIFIED REGISTERED

## 2023-11-01 PROCEDURE — 96376 TX/PRO/DX INJ SAME DRUG ADON: CPT

## 2023-11-01 PROCEDURE — 85027 COMPLETE CBC AUTOMATED: CPT | Performed by: SURGERY

## 2023-11-01 PROCEDURE — 27201423 OPTIME MED/SURG SUP & DEVICES STERILE SUPPLY: Performed by: SURGERY

## 2023-11-01 PROCEDURE — 36000705 HC OR TIME LEV I EA ADD 15 MIN: Performed by: SURGERY

## 2023-11-01 PROCEDURE — 71000039 HC RECOVERY, EACH ADD'L HOUR: Performed by: SURGERY

## 2023-11-01 PROCEDURE — 36415 COLL VENOUS BLD VENIPUNCTURE: CPT | Performed by: NURSE PRACTITIONER

## 2023-11-01 PROCEDURE — 25000003 PHARM REV CODE 250: Performed by: NURSE ANESTHETIST, CERTIFIED REGISTERED

## 2023-11-01 PROCEDURE — 71000033 HC RECOVERY, INTIAL HOUR: Performed by: SURGERY

## 2023-11-01 PROCEDURE — 20205 PR DEEP MUSCLE BIOPSY: ICD-10-PCS | Mod: 51,,, | Performed by: SURGERY

## 2023-11-01 PROCEDURE — D9220A PRA ANESTHESIA: ICD-10-PCS | Mod: ANES,,, | Performed by: ANESTHESIOLOGY

## 2023-11-01 PROCEDURE — 96375 TX/PRO/DX INJ NEW DRUG ADDON: CPT

## 2023-11-01 RX ORDER — ONDANSETRON 2 MG/ML
4 INJECTION INTRAMUSCULAR; INTRAVENOUS DAILY PRN
Status: DISCONTINUED | OUTPATIENT
Start: 2023-11-01 | End: 2023-11-01 | Stop reason: HOSPADM

## 2023-11-01 RX ORDER — HYDROMORPHONE HYDROCHLORIDE 1 MG/ML
0.2 INJECTION, SOLUTION INTRAMUSCULAR; INTRAVENOUS; SUBCUTANEOUS EVERY 5 MIN PRN
Status: DISCONTINUED | OUTPATIENT
Start: 2023-11-01 | End: 2023-11-01 | Stop reason: HOSPADM

## 2023-11-01 RX ORDER — HYDROMORPHONE HYDROCHLORIDE 1 MG/ML
1 INJECTION, SOLUTION INTRAMUSCULAR; INTRAVENOUS; SUBCUTANEOUS EVERY 6 HOURS PRN
Status: DISCONTINUED | OUTPATIENT
Start: 2023-11-01 | End: 2023-11-01

## 2023-11-01 RX ORDER — OXYCODONE HYDROCHLORIDE 5 MG/1
5 TABLET ORAL
Status: DISCONTINUED | OUTPATIENT
Start: 2023-11-01 | End: 2023-11-01 | Stop reason: HOSPADM

## 2023-11-01 RX ORDER — MORPHINE SULFATE 4 MG/ML
4 INJECTION, SOLUTION INTRAMUSCULAR; INTRAVENOUS EVERY 4 HOURS PRN
Status: DISCONTINUED | OUTPATIENT
Start: 2023-11-01 | End: 2023-11-09 | Stop reason: HOSPADM

## 2023-11-01 RX ORDER — EPHEDRINE SULFATE 50 MG/ML
INJECTION, SOLUTION INTRAVENOUS
Status: DISCONTINUED | OUTPATIENT
Start: 2023-11-01 | End: 2023-11-01

## 2023-11-01 RX ORDER — PHENYLEPHRINE HYDROCHLORIDE 10 MG/ML
INJECTION INTRAVENOUS
Status: DISCONTINUED | OUTPATIENT
Start: 2023-11-01 | End: 2023-11-01

## 2023-11-01 RX ORDER — FENTANYL CITRATE 50 UG/ML
INJECTION, SOLUTION INTRAMUSCULAR; INTRAVENOUS
Status: DISCONTINUED | OUTPATIENT
Start: 2023-11-01 | End: 2023-11-01

## 2023-11-01 RX ORDER — CEFAZOLIN SODIUM 1 G/50ML
SOLUTION INTRAVENOUS
Status: DISCONTINUED | OUTPATIENT
Start: 2023-11-01 | End: 2023-11-01

## 2023-11-01 RX ORDER — FAMOTIDINE 10 MG/ML
INJECTION INTRAVENOUS
Status: DISCONTINUED | OUTPATIENT
Start: 2023-11-01 | End: 2023-11-01

## 2023-11-01 RX ORDER — LIDOCAINE HYDROCHLORIDE 10 MG/ML
4 INJECTION, SOLUTION EPIDURAL; INFILTRATION; INTRACAUDAL; PERINEURAL ONCE
Status: COMPLETED | OUTPATIENT
Start: 2023-11-02 | End: 2023-11-02

## 2023-11-01 RX ORDER — OXYCODONE HYDROCHLORIDE 5 MG/1
10 TABLET ORAL EVERY 4 HOURS PRN
Status: DISPENSED | OUTPATIENT
Start: 2023-11-01 | End: 2023-11-02

## 2023-11-01 RX ORDER — BUPIVACAINE HCL/EPINEPHRINE 0.25-.0005
VIAL (ML) INJECTION
Status: DISCONTINUED | OUTPATIENT
Start: 2023-11-01 | End: 2023-11-01 | Stop reason: HOSPADM

## 2023-11-01 RX ORDER — LIDOCAINE HYDROCHLORIDE 20 MG/ML
INJECTION, SOLUTION EPIDURAL; INFILTRATION; INTRACAUDAL; PERINEURAL
Status: DISCONTINUED | OUTPATIENT
Start: 2023-11-01 | End: 2023-11-01

## 2023-11-01 RX ORDER — LIDOCAINE HYDROCHLORIDE 10 MG/ML
2 INJECTION, SOLUTION EPIDURAL; INFILTRATION; INTRACAUDAL; PERINEURAL ONCE
Status: COMPLETED | OUTPATIENT
Start: 2023-11-01 | End: 2023-11-02

## 2023-11-01 RX ORDER — HYDROCODONE BITARTRATE AND ACETAMINOPHEN 10; 325 MG/1; MG/1
1 TABLET ORAL EVERY 6 HOURS PRN
Status: DISCONTINUED | OUTPATIENT
Start: 2023-11-01 | End: 2023-11-03

## 2023-11-01 RX ORDER — PROPOFOL 10 MG/ML
VIAL (ML) INTRAVENOUS
Status: DISCONTINUED | OUTPATIENT
Start: 2023-11-01 | End: 2023-11-01

## 2023-11-01 RX ORDER — ACETAMINOPHEN 325 MG/1
650 TABLET ORAL EVERY 6 HOURS PRN
Status: DISCONTINUED | OUTPATIENT
Start: 2023-11-01 | End: 2023-11-09 | Stop reason: HOSPADM

## 2023-11-01 RX ORDER — MIDAZOLAM HYDROCHLORIDE 1 MG/ML
INJECTION INTRAMUSCULAR; INTRAVENOUS
Status: DISCONTINUED | OUTPATIENT
Start: 2023-11-01 | End: 2023-11-01

## 2023-11-01 RX ORDER — HYDROCODONE BITARTRATE AND ACETAMINOPHEN 5; 325 MG/1; MG/1
1 TABLET ORAL EVERY 6 HOURS PRN
Status: DISCONTINUED | OUTPATIENT
Start: 2023-11-01 | End: 2023-11-03

## 2023-11-01 RX ORDER — DIPHENHYDRAMINE HYDROCHLORIDE 50 MG/ML
12.5 INJECTION INTRAMUSCULAR; INTRAVENOUS
Status: DISCONTINUED | OUTPATIENT
Start: 2023-11-01 | End: 2023-11-01 | Stop reason: HOSPADM

## 2023-11-01 RX ORDER — SUCCINYLCHOLINE CHLORIDE 20 MG/ML
INJECTION INTRAMUSCULAR; INTRAVENOUS
Status: DISCONTINUED | OUTPATIENT
Start: 2023-11-01 | End: 2023-11-01

## 2023-11-01 RX ADMIN — EPHEDRINE SULFATE 10 MG: 50 INJECTION INTRAVENOUS at 11:11

## 2023-11-01 RX ADMIN — MIDAZOLAM HYDROCHLORIDE 2 MG: 1 INJECTION, SOLUTION INTRAMUSCULAR; INTRAVENOUS at 10:11

## 2023-11-01 RX ADMIN — PROPOFOL 150 MG: 10 INJECTION, EMULSION INTRAVENOUS at 10:11

## 2023-11-01 RX ADMIN — FENTANYL CITRATE 50 MCG: 0.05 INJECTION, SOLUTION INTRAMUSCULAR; INTRAVENOUS at 10:11

## 2023-11-01 RX ADMIN — MORPHINE SULFATE 4 MG: 4 INJECTION, SOLUTION INTRAMUSCULAR; INTRAVENOUS at 07:11

## 2023-11-01 RX ADMIN — LIDOCAINE HYDROCHLORIDE 50 MG: 20 INJECTION, SOLUTION INTRAVENOUS at 10:11

## 2023-11-01 RX ADMIN — ATORVASTATIN CALCIUM 10 MG: 10 TABLET, FILM COATED ORAL at 08:11

## 2023-11-01 RX ADMIN — MEPERIDINE HYDROCHLORIDE 12.5 MG: 50 INJECTION, SOLUTION INTRAMUSCULAR; INTRAVENOUS; SUBCUTANEOUS at 01:11

## 2023-11-01 RX ADMIN — ONDANSETRON 4 MG: 2 INJECTION INTRAMUSCULAR; INTRAVENOUS at 07:11

## 2023-11-01 RX ADMIN — HYDROMORPHONE HYDROCHLORIDE 1 MG: 1 INJECTION, SOLUTION INTRAMUSCULAR; INTRAVENOUS; SUBCUTANEOUS at 08:11

## 2023-11-01 RX ADMIN — OXYCODONE HYDROCHLORIDE 10 MG: 5 TABLET ORAL at 05:11

## 2023-11-01 RX ADMIN — Medication 120 MG: at 10:11

## 2023-11-01 RX ADMIN — HYDROMORPHONE HYDROCHLORIDE 2 MG: 1 INJECTION, SOLUTION INTRAMUSCULAR; INTRAVENOUS; SUBCUTANEOUS at 02:11

## 2023-11-01 RX ADMIN — PHENYLEPHRINE HYDROCHLORIDE 100 MCG: 10 INJECTION INTRAVENOUS at 11:11

## 2023-11-01 RX ADMIN — SODIUM CHLORIDE, SODIUM LACTATE, POTASSIUM CHLORIDE, AND CALCIUM CHLORIDE: .6; .31; .03; .02 INJECTION, SOLUTION INTRAVENOUS at 10:11

## 2023-11-01 RX ADMIN — CEFAZOLIN SODIUM 2 G: 1 SOLUTION INTRAVENOUS at 10:11

## 2023-11-01 RX ADMIN — FAMOTIDINE 20 MG: 10 INJECTION, SOLUTION INTRAVENOUS at 10:11

## 2023-11-01 RX ADMIN — MORPHINE SULFATE 4 MG: 4 INJECTION, SOLUTION INTRAMUSCULAR; INTRAVENOUS at 09:11

## 2023-11-01 RX ADMIN — ONDANSETRON 4 MG: 2 INJECTION INTRAMUSCULAR; INTRAVENOUS at 10:11

## 2023-11-01 RX ADMIN — FIBRINOGEN HUMAN, HUMAN THROMBIN 10 ML: 90; 500 SOLUTION TOPICAL at 11:11

## 2023-11-01 NOTE — ANESTHESIA POSTPROCEDURE EVALUATION
Anesthesia Post Evaluation    Patient: Jos Espino    Procedure(s) Performed: Procedure(s) (LRB):  INCISION AND DRAINAGE, HEMATOMA (Left)    Final Anesthesia Type: general      Patient location during evaluation: PACU  Patient participation: Yes- Able to Participate  Level of consciousness: awake and alert  Post-procedure vital signs: reviewed and stable  Pain management: adequate  Airway patency: patent    PONV status at discharge: No PONV  Anesthetic complications: no      Cardiovascular status: blood pressure returned to baseline and stable  Respiratory status: unassisted and room air  Hydration status: euvolemic  Follow-up not needed.  Comments: Motor function assessed by  in PACU, and it had improved.          Vitals Value Taken Time   /76 11/01/23 1500   Temp 37.1 °C (98.7 °F) 11/01/23 1500   Pulse 90 11/01/23 1500   Resp 16 11/01/23 1500   SpO2 97 % 11/01/23 1500         Event Time   Out of Recovery 11/01/2023 13:44:00         Pain/Natasha Score: Pain Rating Prior to Med Admin: 10 (11/1/2023  9:40 AM)  Pain Rating Post Med Admin: 8 (11/1/2023 10:10 AM)  Natasha Score: 10 (11/1/2023  3:00 PM)

## 2023-11-01 NOTE — ANESTHESIA PROCEDURE NOTES
Intubation    Date/Time: 11/1/2023 10:31 AM    Performed by: Gary Monroe CRNA  Authorized by: Babar Ruead MD    Intubation:     Induction:  Intravenous    Intubated:  Postinduction    Mask Ventilation:  Easy mask    Attempts:  1    Attempted By:  CRNA    Method of Intubation:  Video laryngoscopy    Blade:  Leahy 3    Laryngeal View Grade: Grade I - full view of cords      Difficult Airway Encountered?: No      Complications:  None    Airway Device:  Oral endotracheal tube    Airway Device Size:  7.5    Style/Cuff Inflation:  Cuffed (inflated to minimal occlusive pressure)    Inflation Amount (mL):  5    Tube secured:  21    Secured at:  The lips    Placement Verified By:  Capnometry    Complicating Factors:  None    Findings Post-Intubation:  BS equal bilateral

## 2023-11-01 NOTE — PT/OT/SLP PROGRESS
Occupational Therapy      Patient Name:  Jos Espino   MRN:  [id number]    Patient not seen today secondary to  (Off floor for I&D.). Will follow-up tomorrow.    11/1/2023

## 2023-11-01 NOTE — NURSING
1004 patient off the floor for surgery. Spouse at the bedside.     1357 patient back from surgery. Patient awake and alert. No acute distress noted. Surgical site noted without any complications. Collins drain in place with bloody drainage noted. Safety precautions in place. Will continue to monitor.

## 2023-11-01 NOTE — SUBJECTIVE & OBJECTIVE
Interval History: Patient seen and examined. Had psoas hematoma evacuation today. Seen post-op. Feeling better than this AM. Some pain to LLE starting to come back.      Objective:     Vital Signs (Most Recent):  Temp: 98.7 °F (37.1 °C) (11/01/23 1500)  Pulse: 90 (11/01/23 1500)  Resp: 16 (11/01/23 1500)  BP: (!) 149/76 (11/01/23 1500)  SpO2: 97 % (11/01/23 1500) Vital Signs (24h Range):  Temp:  [97.8 °F (36.6 °C)-98.9 °F (37.2 °C)] 98.7 °F (37.1 °C)  Pulse:  [73-97] 90  Resp:  [10-20] 16  SpO2:  [96 %-100 %] 97 %  BP: (149-195)/(74-86) 149/76     Weight: 81.6 kg (180 lb)  Body mass index is 25.83 kg/m².    Intake/Output Summary (Last 24 hours) at 11/1/2023 1645  Last data filed at 11/1/2023 1616  Gross per 24 hour   Intake 2120 ml   Output 530 ml   Net 1590 ml         Physical Exam  Vitals reviewed.   Constitutional:       General: He is not in acute distress.     Appearance: He is not ill-appearing.   HENT:      Head: Normocephalic and atraumatic.   Cardiovascular:      Rate and Rhythm: Normal rate and regular rhythm.      Heart sounds: Normal heart sounds.   Pulmonary:      Effort: Pulmonary effort is normal. No respiratory distress.   Musculoskeletal:         General: Normal range of motion.   Skin:     General: Skin is warm and dry.      Comments: Left groin dressing intact  Left groin drain with mild sanguinous output   Neurological:      General: No focal deficit present.      Mental Status: He is alert and oriented to person, place, and time. Mental status is at baseline.   Psychiatric:         Mood and Affect: Affect normal.         Behavior: Behavior normal.             Significant Labs: All pertinent labs within the past 24 hours have been reviewed.    Significant Imaging: I have reviewed all pertinent imaging results/findings within the past 24 hours.

## 2023-11-01 NOTE — HPI
68 year old  male presents emergency room with left pelvis/groin pain.  The patient states the pain began yesterday.  He does admit to attending exercise classes that includes exercising his legs with weights about 4 to 5 times a week at the [employer] exercise gym.  But he denied actual trauma.  The patient states his left thigh pain became excruciating with minimal range of motion so he came to emergency room for further evaluation. In the emergency room radiographic imaging revealed a Iliopsoas and left pelvic side wall intramuscular hematomas with a small amount of what is likely hemorrhage within the left retroperitoneum. General surgery was consulted and agreed to see the patient in consult.  The patient will be given pain management and monitored closely. Past medical history significant for hyperlipidemia ED and hypertension that is diet-controlled. The patient is not on anticoagulation and denies excessive NSAID use

## 2023-11-01 NOTE — PLAN OF CARE
Problem: Adult Inpatient Plan of Care  Goal: Plan of Care Review  Outcome: Met  Goal: Patient-Specific Goal (Individualized)  Outcome: Met  Goal: Absence of Hospital-Acquired Illness or Injury  Outcome: Met  Goal: Optimal Comfort and Wellbeing  Outcome: Met  Goal: Readiness for Transition of Care  Outcome: Met     Problem: Fall Injury Risk  Goal: Absence of Fall and Fall-Related Injury  Outcome: Met     Problem: Surgical Site Infection  Goal: Absence of Infection Signs and Symptoms  Outcome: Met

## 2023-11-01 NOTE — ANESTHESIA PREPROCEDURE EVALUATION
11/01/2023  Jos Espino is a 68 y.o., male.      Patient Active Problem List   Diagnosis    Hypercholesteremia    Enlarged prostate with lower urinary tract symptoms (LUTS)    Retroperitoneal hemorrhage       Past Surgical History:   Procedure Laterality Date    TRANSURETHRAL RESECTION OF PROSTATE N/A 4/7/2022    Procedure: TURP (TRANSURETHRAL RESECTION OF PROSTATE);  Surgeon: Ricky Frances Jr., MD;  Location: [facility];  Service: Urology;  Laterality: N/A;        Tobacco Use:  The patient  reports that he has never smoked. He has never used smokeless tobacco.     Results for orders placed or performed in visit on 04/07/22   EKG 12-lead    Collection Time: 04/07/22 10:58 AM    Narrative    Test Reason : Z01.818    Vent. Rate : 063 BPM     Atrial Rate : 063 BPM     P-R Int : 148 ms          QRS Dur : 080 ms      QT Int : 392 ms       P-R-T Axes : 057 031 045 degrees     QTc Int : 401 ms    Normal sinus rhythm  Minimal voltage criteria for LVH, may be normal variant  Borderline Abnormal ECG  No previous ECGs available  Confirmed by Ana Maria CASTILLO, Titi PROCTOR ([id number]) on 4/13/2022 10:23:04 PM    Referred By: RICKY FRANCES JR           Confirmed By:Titi Rodgers MD        Imaging Results          CT Abdomen Pelvis With IV Contrast (Final result)  Result time 10/30/23 20:42:37    Final result by Luke Britt MD (10/30/23 20:42:37)                 Narrative:    CT ABDOMEN PELVIS WITH IV CONTRAST    COMPARISONS:  none.    ADDITIONAL PERTINENT HISTORY:   Flank pain with possible kidney stone    TECHNIQUE:   Multiple axial images were obtained from the lung bases through the lesser trochanters after the adminstration of intravenous contrast.  One of the following dose optimization techniques was utilized in the performance of this exam: Automated exposure control; adjustment of the mA and/or kV according to the  patient's size; or use of an iterative  reconstruction technique.  Specific details can be referenced in the facility's radiology CT exam operational policy.    CONTRAST:   100 mL of IV Omnipaque 350.    FINDINGS:  Lung bases: negative    Free air/free fluid: Small amount of free fluid within the inferior aspects of the left retroperitoneum and extending along the left pelvic sidewall.  Liver:  negative  Spleen: negative  Adrenal glands: negative  Kidneys /ureters/urinary bladder: negative  Pancreas: negative  Gall Bladder:   negative    Bowel / mesentery: Negative including a normal-appearing appendix in the right lower quadrant.    Lymph node assessment: negative    Pelvic contents: negative  Abdominal vasculature: Atherosclerotic disease of the abdominal aorta and its major branches.    Surrounding soft tissues: Enlargement of the left iliopsoas musculature is well as the left pelvic sidewall musculature compatible with intramuscular hematomas.  Osseous structures: negative      IMPRESSION:    1. Iliopsoas and left pelvic side wall intramuscular hematomas with a small amount of what is likely hemorrhage within the left retroperitoneum. No active extravasation of contrast noted at the time of the CT scan..  2. No other acute intra-abdominal or intrapelvic process.    Electronically signed by:  Luke Britt MD  10/30/2023 08:42 PM CDT Workstation: [id number]                               Lab Results   Component Value Date    WBC 12.90 (H) 11/01/2023    HGB 11.6 (L) 11/01/2023    HCT 34.5 (L) 11/01/2023    MCV 82 11/01/2023     11/01/2023     BMP  Lab Results   Component Value Date     11/01/2023    K 3.9 11/01/2023     11/01/2023    CO2 30 (H) 11/01/2023    BUN 14 11/01/2023    CREATININE 1.0 11/01/2023    CALCIUM 9.8 11/01/2023    ANIONGAP 5 (L) 11/01/2023     (H) 11/01/2023     (H) 10/31/2023     (H) 10/30/2023       No results found for this or any previous  visit.              Pre-op Assessment    I have reviewed the Patient Summary Reports.     I have reviewed the Nursing Notes. I have reviewed the NPO Status.   I have reviewed the Medications.     Review of Systems  Anesthesia Hx:  No problems with previous Anesthesia  Denies Family Hx of Anesthesia complications.   Denies Personal Hx of Anesthesia complications.   Social:  Non-Smoker    Hematology/Oncology:  Hematology Normal        Cardiovascular:   hyperlipidemia    Pulmonary:  Pulmonary Normal    Renal/:   BPH    Hepatic/GI:  Hepatic/GI Normal Abdominal wall/ retroperitoneal hematoma   Musculoskeletal:  Musculoskeletal Normal    Neurological:  Neurology Normal    Endocrine:  Endocrine Normal        Physical Exam  General: Well nourished, Cooperative, Alert and Oriented    Airway:  Mallampati: III / II  Mouth Opening: Normal  TM Distance: Normal  Tongue: Normal  Neck ROM: Normal ROM    Chest/Lungs:  Clear to auscultation    Heart:  Rate: Normal  Rhythm: Regular Rhythm  Sounds: Normal    Abdomen:  Normal, Soft, Nontender        Anesthesia Plan  Type of Anesthesia, risks & benefits discussed:    Anesthesia Type: Gen ETT  Intra-op Monitoring Plan: Standard ASA Monitors  Post Op Pain Control Plan: multimodal analgesia and IV/PO Opioids PRN  Induction:  IV  Airway Plan: Video, Post-Induction  Informed Consent: Informed consent signed with the Patient and all parties understand the risks and agree with anesthesia plan.  All questions answered.   ASA Score: 3 Emergent  Anesthesia Plan Notes:   GETA - RSI  IV tylenol  Zofran Pepcid     Ready For Surgery From Anesthesia Perspective.     .

## 2023-11-01 NOTE — ASSESSMENT & PLAN NOTE
Developed femoral neuropathy  -yesterday was able to extend at knee, now completely unable  -worsening neuropathy, will plan to decompress hematoma  -unable to control pain

## 2023-11-01 NOTE — HOSPITAL COURSE
68M with PMH HTN, HLD, and BPH s/p TURP was admitted with nontraumatic hematoma to left iliopsoas and pelvic wall. Hemoglobin was monitored and down trended and he was transfused 1 unit PRBC.  His hemoglobin has been stable since.  Gen surg consulted and he underwent hematoma evacuation with drain placement.  Drain output was monitored and slowly improved.  His drain was removed on 11/06. Neurology was also consulted for numbness and weakness to LLE.  This improved with drainage of the hematoma.  He had a couple fever spikes and has been on empiric Zosyn.  Fever has resolved.  He has been following with PT and OT.  His strength is slowly improving but not back to baseline.  He is awaiting placement in rehab for further therapy. Drain is now removed. Patient is subsequently discharged to SNF.

## 2023-11-01 NOTE — TRANSFER OF CARE
"Anesthesia Transfer of Care Note    Patient: Jos Espino    Procedure(s) Performed: Procedure(s) (LRB):  INCISION AND DRAINAGE, HEMATOMA (Left)    Patient location: PACU    Anesthesia Type: general    Transport from OR: Transported from OR on room air with adequate spontaneous ventilation    Post pain: adequate analgesia    Post assessment: no apparent anesthetic complications and tolerated procedure well    Post vital signs: stable    Level of consciousness: responds to stimulation    Nausea/Vomiting: no nausea/vomiting    Complications: none    Transfer of care protocol was followed      Last vitals:   Visit Vitals  BP (!) 168/77   Pulse 73   Temp 36.6 °C (97.8 °F) (Oral)   Resp 18   Ht 5' 10" (1.778 m)   Wt 81.6 kg (180 lb)   SpO2 96%   BMI 25.83 kg/m²     "

## 2023-11-01 NOTE — SUBJECTIVE & OBJECTIVE
Interval History: leg pain much worse  Cannot move leg now    Medications:  Continuous Infusions:  Scheduled Meds:   atorvastatin  10 mg Oral Daily    fentaNYL  1 patch Transdermal Q72H     PRN Meds:acetaminophen, HYDROcodone-acetaminophen, HYDROcodone-acetaminophen, HYDROmorphone, melatonin, morphine, ondansetron, sodium chloride 0.9%     Review of patient's allergies indicates:  No Known Allergies  Objective:     Vital Signs (Most Recent):  Temp: 97.8 °F (36.6 °C) (11/01/23 0714)  Pulse: 73 (11/01/23 0714)  Resp: 18 (11/01/23 0940)  BP: (!) 168/77 (11/01/23 0714)  SpO2: 96 % (11/01/23 0714) Vital Signs (24h Range):  Temp:  [97.8 °F (36.6 °C)-98.9 °F (37.2 °C)] 97.8 °F (36.6 °C)  Pulse:  [65-87] 73  Resp:  [18-20] 18  SpO2:  [96 %-100 %] 96 %  BP: (138-195)/(68-86) 168/77     Weight: 81.6 kg (180 lb)  Body mass index is 25.83 kg/m².    Intake/Output - Last 3 Shifts         10/30 0700  10/31 0659 10/31 0700  11/01 0659 11/01 0700  11/02 0659    P.O.  360 240    Total Intake(mL/kg)  360 (4.4) 240 (2.9)    Urine (mL/kg/hr) 400  200 (0.8)    Total Output 400  200    Net -400 +360 +40           Urine Occurrence  2 x              Physical Exam  Abdominal:      General: There is no distension.      Palpations: Abdomen is soft.      Tenderness: There is no abdominal tenderness.   Musculoskeletal:      Comments: Left upper thigh very painful  Some swelling   Skin:     Comments: Palpable pulses in both feet  Sensation intact in feet  Grossly able to move feet  Can extend at knee on right  Cannot extend at all on knee on left          Significant Labs:  I have reviewed all pertinent lab results within the past 24 hours.  CBC:   Recent Labs   Lab 11/01/23  0238   WBC 12.90*   RBC 4.20*   HGB 11.6*   HCT 34.5*      MCV 82   MCH 27.6   MCHC 33.6     CMP:   Recent Labs   Lab 11/01/23  0238   *   CALCIUM 9.8   ALBUMIN 4.2   PROT 7.5      K 3.9   CO2 30*      BUN 14   CREATININE 1.0   ALKPHOS 53*   ALT 17    AST 30   BILITOT 1.2*       Significant Diagnostics:  I have reviewed all pertinent imaging results/findings within the past 24 hours.

## 2023-11-01 NOTE — PROGRESS NOTES
[facility] Medicine  Progress Note    Patient Name: Jos Espino  MRN: [id number]  Patient Class: IP- Inpatient   Admission Date: 10/30/2023  Length of Stay: 0 days  Attending Physician: Krishna Schneider MD  Primary Care Provider: Abiel Salguero MD        Subjective:     Principal Problem:Nontraumatic psoas hematoma        HPI:  68 year old  male presents emergency room with left pelvis/groin pain.  The patient states the pain began yesterday.  He does admit to attending exercise classes that includes exercising his legs with weights about 4 to 5 times a week at the [employer] exercise gym.  But he denied actual trauma.  The patient states his left thigh pain became excruciating with minimal range of motion so he came to emergency room for further evaluation. In the emergency room radiographic imaging revealed a Iliopsoas and left pelvic side wall intramuscular hematomas with a small amount of what is likely hemorrhage within the left retroperitoneum. General surgery was consulted and agreed to see the patient in consult.  The patient will be given pain management and monitored closely. Past medical history significant for hyperlipidemia ED and hypertension that is diet-controlled. The patient is not on anticoagulation and denies excessive NSAID use      Overview/Hospital Course:  68M with PMH HTN, HLD, and BPH s/p TURP is admitted with nontraumatic hematoma to left iliopsoas and pelvic wall. Hemoglobin monitored. Gen surg consulted. Neurology consulted as well for numbness to LLE. Taken to OR for hematoma evacuation with general surgery to inability to extend left knee. Drain in place.      Interval History: Patient seen and examined. Had psoas hematoma evacuation today. Seen post-op. Feeling better than this AM. Some pain to LLE starting to come back.      Objective:     Vital Signs (Most Recent):  Temp: 98.7 °F (37.1 °C) (11/01/23 1500)  Pulse: 90 (11/01/23 1500)  Resp: 16  (11/01/23 1500)  BP: (!) 149/76 (11/01/23 1500)  SpO2: 97 % (11/01/23 1500) Vital Signs (24h Range):  Temp:  [97.8 °F (36.6 °C)-98.9 °F (37.2 °C)] 98.7 °F (37.1 °C)  Pulse:  [73-97] 90  Resp:  [10-20] 16  SpO2:  [96 %-100 %] 97 %  BP: (149-195)/(74-86) 149/76     Weight: 81.6 kg (180 lb)  Body mass index is 25.83 kg/m².    Intake/Output Summary (Last 24 hours) at 11/1/2023 1645  Last data filed at 11/1/2023 1616  Gross per 24 hour   Intake 2120 ml   Output 530 ml   Net 1590 ml         Physical Exam  Vitals reviewed.   Constitutional:       General: He is not in acute distress.     Appearance: He is not ill-appearing.   HENT:      Head: Normocephalic and atraumatic.   Cardiovascular:      Rate and Rhythm: Normal rate and regular rhythm.      Heart sounds: Normal heart sounds.   Pulmonary:      Effort: Pulmonary effort is normal. No respiratory distress.   Musculoskeletal:         General: Normal range of motion.   Skin:     General: Skin is warm and dry.      Comments: Left groin dressing intact  Left groin drain with mild sanguinous output   Neurological:      General: No focal deficit present.      Mental Status: He is alert and oriented to person, place, and time. Mental status is at baseline.   Psychiatric:         Mood and Affect: Affect normal.         Behavior: Behavior normal.             Significant Labs: All pertinent labs within the past 24 hours have been reviewed.    Significant Imaging: I have reviewed all pertinent imaging results/findings within the past 24 hours.      Assessment/Plan:      * Nontraumatic psoas hematoma  CT reviewed  Now s/p hematoma evacuation 11/1 with gen surg  - trend hgb  - pain med prn  - drain management per gen surg    Hypercholesteremia  Chronic, continue statin      VTE Risk Mitigation (From admission, onward)         Ordered     IP VTE LOW RISK PATIENT  Once         10/31/23 0209                Discharge Planning   MAGDALENA:  11/3    Code Status: Full Code   Is the patient  medically ready for discharge?:     Reason for patient still in hospital (select all that apply): Patient trending condition  Discharge Plan A: Home with family                  Krishna Schneider MD  Department of Hospital Medicine   [facility]

## 2023-11-01 NOTE — PROGRESS NOTES
[facility]  Department of Neurology  Progress Note  Date: 2023 1:48 PM          Patient Name: Jos Espino   MRN: [id number]   : 1955    AGE: 68 y.o.    LOS: 0 days Hospital Day: 3  Admit date: 10/30/2023  7:00 PM       HPI per EMR: Jos Espino is a 68 y.o. male with a history of  presents emergency room with left pelvis/groin pain.  The patient states the pain began yesterday.  He does admit to attending exercise classes that includes exercising his legs with weights about 4 to 5 times a week at the [employer] exercise gym.  But he denied actual trauma.  The patient states his left thigh pain became excruciating with minimal range of motion so he came to emergency room for further evaluation        In the emergency room radiographic imaging revealed a Iliopsoas and left pelvic side wall intramuscular hematomas with a small amount of what is likely hemorrhage within the left retroperitoneum     General surgery was consulted and agreed to see the patient in consult.  The patient will be given pain management and monitored closely        Past medical history significant for hyperlipidemia ED and hypertension that is diet-controlled        The patient is not on anticoagulation and denies excessive NSAID use     Neurology consult:  Patient was seen and examined me.  States that he presented to the hospital for pain and weakness left groin/left lower extremity.  He states that it started yesterday and admits doing exercises with his legs.  Denies any recent trauma.  In the ER, he had a CT abdomen pelvis with contrast which revealed iliopsoas and left pelvic side intramuscular hematomas.  He is not been on any blood thinners.  General surgery consulted and recommended no surgical intervention.  He describes of numbness in the anterior thigh and leg on the left side and associated weakness in his left lower extremity proximal greater than distal.  Neurology consulted for numbness and  weakness.    11/01/2023: No acute events overnight. Patient was seen and examined by me this morning. Neuro exam worsened this morning.  He would significant weakness in his left lower extremity especially in hip flexion/extension and knee flexion/extension.       Vitals:  Patient Vitals for the past 24 hrs:   BP Temp Temp src Pulse Resp SpO2   11/01/23 1315 (!) 165/78 98 °F (36.7 °C) Temporal 84 11 100 %   11/01/23 1311 (!) 165/78 -- -- 90 20 100 %   11/01/23 1300 (!) 165/77 -- -- 84 10 100 %   11/01/23 1245 (!) 151/74 -- -- 85 11 100 %   11/01/23 1235 (!) 164/80 -- -- 87 10 100 %   11/01/23 1230 (!) 153/79 -- -- 87 10 100 %   11/01/23 1225 (!) 158/76 -- -- 89 11 100 %   11/01/23 1220 (!) 156/81 -- -- 97 11 99 %   11/01/23 0940 -- -- -- -- 18 --   11/01/23 0850 -- -- -- -- 18 --   11/01/23 0714 (!) 168/77 97.8 °F (36.6 °C) Oral 73 18 96 %   11/01/23 0300 (!) 176/86 98.9 °F (37.2 °C) -- 87 18 98 %   11/01/23 0243 -- -- -- -- 18 --   10/31/23 2356 -- -- -- -- 18 --   10/31/23 2031 -- -- -- -- 18 --   10/31/23 1902 (!) 195/81 -- -- -- -- --   10/31/23 1901 (!) 182/83 98.9 °F (37.2 °C) -- 75 18 99 %   10/31/23 1530 138/68 98.2 °F (36.8 °C) Oral 78 18 100 %     PHYSICAL EXAM:     GENERAL APPEARANCE: Well-developed, well-nourished male in no acute distress.  HEENT: Normocephalic and atraumatic. PERRL. Oropharynx unremarkable.  PULM: Comfortable on room air.  CV: RRR.  ABDOMEN: Soft, nontender.  EXTREMITIES: No signs of vascular compromise. Pulses present. No cyanosis, clubbing or edema.  SKIN: Clear; no rashes, lesions or skin breaks in exposed areas.      NEURO:   MENTAL STATUS: Patient awake and oriented to time, place, and person. Affect normal.  CRANIAL NERVES II-XII: Pupils equal, round and reactive to light. Extraocular movements full and intact. No facial asymmetry.  MOTOR: Normal bulk. Tone normal and symmetrical throughout.  No abnormal movements. No tremor.   Strength 5/5 throughout except left lower extremity  "hip flexion and extension 2/5, knee flexion and extension 2/5, ankle dorsiflexion and plantar flexion 3+/5.  REFLEXES: DTRs 1+ throughout and absent on left patellar  SENSATION:  Decreased sensation to light touch on the left anterior thigh and anterior leg.  COORDINATION: Finger-to-nose normal for age and symmetric.  STATION: Romberg deferred.  GAIT: Deferred.    CURRENT SCHEDULED MEDICATIONS:   atorvastatin  10 mg Oral Daily    fentaNYL  1 patch Transdermal Q72H     CURRENT INFUSIONS:    DATA:  Recent Labs   Lab 10/30/23  1934 10/31/23  0421 11/01/23  0238    137 137   K 3.8 4.2 3.9    105 102   CO2 26 25 30*   BUN 13 11 14   CREATININE 1.1 1.1 1.0   * 153* 120*   CALCIUM 10.0 9.9 9.8   AST 22 18 30   ALT 15 15 17     Recent Labs   Lab 10/30/23  1934 10/31/23  0421 11/01/23  0238   WBC 9.73 12.73* 12.90*   HGB 12.9* 12.3* 11.6*   HCT 38.4* 35.4* 34.5*    189 206     No results found for: "PROTEINCSF", "GLUCCSF", "WBCCSF", "RBCCSF", "PMNCSF"  No results found for: "HGBA1C"         I have personally reviewed and interpreted the pertinent imaging and lab results.  Imaging Results              CT Abdomen Pelvis With IV Contrast (Final result)  Result time 10/30/23 20:42:37      Final result by Luke Britt MD (10/30/23 20:42:37)                   Narrative:    CT ABDOMEN PELVIS WITH IV CONTRAST    COMPARISONS:  none.    ADDITIONAL PERTINENT HISTORY:   Flank pain with possible kidney stone    TECHNIQUE:   Multiple axial images were obtained from the lung bases through the lesser trochanters after the adminstration of intravenous contrast.  One of the following dose optimization techniques was utilized in the performance of this exam: Automated exposure control; adjustment of the mA and/or kV according to the patient's size; or use of an iterative  reconstruction technique.  Specific details can be referenced in the facility's radiology CT exam operational policy.    CONTRAST:   100 mL of IV " Omnipaque 350.    FINDINGS:  Lung bases: negative    Free air/free fluid: Small amount of free fluid within the inferior aspects of the left retroperitoneum and extending along the left pelvic sidewall.  Liver:  negative  Spleen: negative  Adrenal glands: negative  Kidneys /ureters/urinary bladder: negative  Pancreas: negative  Gall Bladder:   negative    Bowel / mesentery: Negative including a normal-appearing appendix in the right lower quadrant.    Lymph node assessment: negative    Pelvic contents: negative  Abdominal vasculature: Atherosclerotic disease of the abdominal aorta and its major branches.    Surrounding soft tissues: Enlargement of the left iliopsoas musculature is well as the left pelvic sidewall musculature compatible with intramuscular hematomas.  Osseous structures: negative      IMPRESSION:    1. Iliopsoas and left pelvic side wall intramuscular hematomas with a small amount of what is likely hemorrhage within the left retroperitoneum. No active extravasation of contrast noted at the time of the CT scan..  2. No other acute intra-abdominal or intrapelvic process.    Electronically signed by:  Luke Britt MD  10/30/2023 08:42 PM CDT Workstation: [id number]                                            ASSESSMENT AND PLAN:     Entrapment neuropathy  Intramuscular and retroperitoneal hematoma        Plan:   Patient presented with numbness/pain and weakness in his left lower extremity.  On exam, he has numbness in the anterior femoral cutaneous nerve and saphenous nerve distribution and weakness in hip flexion/extension, knee flexion and extension.  Symptoms likely secondary to entrapment neuropathy in the setting of hematoma  Management of intramuscular/retroperitoneal hematoma per General surgery and primary team.  I personally discussed with surgical team Dr. Farias and due to patient's worsening symptoms, plan was made to perform decompression of the hematoma.  Avoid any blood thinners and  antiplatelet at this time.  PT OT evaluate and treat  DVT prophylaxis with SCds  Will follow             Johnathon Reddy MD  Neurology/vascular Neurology  Date of Service: 11/01/2023  1:48 PM    Please note: This note was transcribed using voice recognition software. Because of this technology there are often uinintended grammatical, spelling, and other transcription errors. Please disregard these errors.

## 2023-11-01 NOTE — PT/OT/SLP PROGRESS
Physical Therapy      Patient Name:  Jos Espino   MRN:  [id number]    Patient not seen today secondary to Off the floor for procedure/surgery. Will follow-up 11/2/23.

## 2023-11-01 NOTE — OP NOTE
Psoas hematoma evacuation  Procedure Note    Date of procedure:   11/01/2023    Indications:  See 68-year-old male with spontaneous retroperitoneal hematoma for unknown reasons develops femoral neuropathy with the inability to extend at the knee, urgent surgical intervention undertaken.    Pre-operative Diagnosis: retroperitoneal hematoma, femoral neuropathy    Post-operative Diagnosis: Same    Surgeon: Melba Farias MD    Assistants: none    Anesthesia: General endotracheal anesthesia    ASA Class: 3    Procedure Details   The patient was seen in the Holding Room. The risks, benefits, complications, treatment options, and expected outcomes were discussed with the patient. The possibilities of reaction to medication, pulmonary aspiration, perforation of viscus, bleeding, recurrent infection, the need for additional procedures, failure to diagnose a condition, and creating a complication requiring transfusion or operation were discussed with the patient. The patient concurred with the proposed plan, giving informed consent. The site of surgery properly noted/marked. The patient was taken to Operating Room identified as [place] Espino and the procedure verified as evacuation of hematoma. A Time Out was held and the above information confirmed.    Full general anesthesia was induced with orotracheal intubation. The patient was prepped and draped in a supine position. Appropriate antibiotics were given intravenously. Arms were out.    An oblique incision was created between the anterior superior iliac spine and the pubic tubercle on the proposed left side.  Cautery was used to transect the Festus's fascia.  The external oblique aponeurosis was easily identified and sharply incised and opened.  The external inguinal ring was also opened.  Dissection was undertaken above and below the inguinal ligament.  Below the inguinal ligament the fascia erin was obliquely incised.  This was approximately 2 cm.  This expose the  sartorius which was then bluntly divided to allow access into the iliacus and psoas.  There was clearly hematoma at this level.  The hematoma was bluntly opened and old blood clot quickly came out of the opening as if it was on some pressure.  This was suctioned and bluntly followed superiorly above the inguinal ligament.  There was some adherent clot to what appeared to be the iliacus.  Small portion of this muscle was biopsied.  The hematoma was completely evacuated from below the inguinal ligament.  Above the inguinal ligament at the level of the psoas I could feel the rest of the hematoma.  The internal oblique was found and bluntly .  Ensuring that no structures were between my finger which was in the hematoma and posterior to the internal oblique I carefully opened the peritoneum and allowed access into the psoas and exposed my finger.  There was a very large hematoma which was suctioned.  Portions had to be removed as it had adhered to the surrounding muscle.  No obvious bleeding was found.  The area was irrigated both above and below the ileo inguinal ligament.  A drain was then left starting within the psoas and tracking down below the inguinal ligament where the hematoma had been.  Tisseel was sprayed into the psoas muscle. This was sutured in place using a nylon suture.  The fascia erin was then approximated with PDS suture.  The external oblique aponeurosis was then approximated using Vicryl suture.  Festus's fascia was then approximated with interrupted Vicryl and skin closed with Monocryl.  Dressings were placed.  Drain was placed the suctioned.  Patient tolerated the procedure well.      Instrument, sponge, and needle counts were correct prior to wound closure and at the conclusion of the case.     Findings:  retroperitoneal hematoma    Estimated Blood Loss: 300.0 cc    Drains: retroperitoneum    Total IV Fluids: see anesthesia    Specimens: muscle biopsy     Implants:  tiseel    Complications:  None; patient tolerated the procedure well.    Disposition: PACU - hemodynamically stable.    Condition: stable    Attending Attestation: I was present and scrubbed for the entire procedure.

## 2023-11-01 NOTE — ASSESSMENT & PLAN NOTE
CT reviewed  Now s/p hematoma evacuation 11/1 with gen surg  - trend hgb  - pain med prn  - drain management per gen surg

## 2023-11-01 NOTE — PROGRESS NOTES
[facility], [facility]  General Surgery  Progress Note    Subjective:     History of Present Illness:  67 y/o male presents with left-sided abdominal pain radiating down to his leg.  He does note some left thigh and knee numbness as well.  He notes this has been present starting over about 24-48 hrs.  He denies any traumatic events and does not take any blood thinners.  He does report he has been exercising more and specifically doing leg presses.      Post-Op Info:  Procedure(s) (LRB):  REPAIR, HERNIA, INGUINAL, RECURRENT (Left)         Interval History: leg pain much worse  Cannot move leg now    Medications:  Continuous Infusions:  Scheduled Meds:   atorvastatin  10 mg Oral Daily    fentaNYL  1 patch Transdermal Q72H     PRN Meds:acetaminophen, HYDROcodone-acetaminophen, HYDROcodone-acetaminophen, HYDROmorphone, melatonin, morphine, ondansetron, sodium chloride 0.9%     Review of patient's allergies indicates:  No Known Allergies  Objective:     Vital Signs (Most Recent):  Temp: 97.8 °F (36.6 °C) (11/01/23 0714)  Pulse: 73 (11/01/23 0714)  Resp: 18 (11/01/23 0940)  BP: (!) 168/77 (11/01/23 0714)  SpO2: 96 % (11/01/23 0714) Vital Signs (24h Range):  Temp:  [97.8 °F (36.6 °C)-98.9 °F (37.2 °C)] 97.8 °F (36.6 °C)  Pulse:  [65-87] 73  Resp:  [18-20] 18  SpO2:  [96 %-100 %] 96 %  BP: (138-195)/(68-86) 168/77     Weight: 81.6 kg (180 lb)  Body mass index is 25.83 kg/m².    Intake/Output - Last 3 Shifts         10/30 0700  10/31 0659 10/31 0700  11/01 0659 11/01 0700  11/02 0659    P.O.  360 240    Total Intake(mL/kg)  360 (4.4) 240 (2.9)    Urine (mL/kg/hr) 400  200 (0.8)    Total Output 400  200    Net -400 +360 +40           Urine Occurrence  2 x              Physical Exam  Abdominal:      General: There is no distension.      Palpations: Abdomen is soft.      Tenderness: There is no abdominal tenderness.   Musculoskeletal:      Comments: Left upper thigh very painful  Some swelling   Skin:     Comments:  Palpable pulses in both feet  Sensation intact in feet  Grossly able to move feet  Can extend at knee on right  Cannot extend at all on knee on left          Significant Labs:  I have reviewed all pertinent lab results within the past 24 hours.  CBC:   Recent Labs   Lab 11/01/23 0238   WBC 12.90*   RBC 4.20*   HGB 11.6*   HCT 34.5*      MCV 82   MCH 27.6   MCHC 33.6     CMP:   Recent Labs   Lab 11/01/23 0238   *   CALCIUM 9.8   ALBUMIN 4.2   PROT 7.5      K 3.9   CO2 30*      BUN 14   CREATININE 1.0   ALKPHOS 53*   ALT 17   AST 30   BILITOT 1.2*       Significant Diagnostics:  I have reviewed all pertinent imaging results/findings within the past 24 hours.    Assessment/Plan:     * Retroperitoneal hemorrhage  Developed femoral neuropathy  -yesterday was able to extend at knee, now completely unable  -worsening neuropathy, will plan to decompress hematoma  -unable to control pain         Melba Farias MD  General Surgery  [facility]

## 2023-11-02 LAB
ABO + RH BLD: NORMAL
ALBUMIN SERPL BCP-MCNC: 3.7 G/DL (ref 3.5–5.2)
ALP SERPL-CCNC: 46 U/L (ref 55–135)
ALT SERPL W/O P-5'-P-CCNC: 30 U/L (ref 10–44)
ANION GAP SERPL CALC-SCNC: 6 MMOL/L (ref 8–16)
AST SERPL-CCNC: 82 U/L (ref 10–40)
BASOPHILS # BLD AUTO: 0.04 K/UL (ref 0–0.2)
BASOPHILS # BLD AUTO: 0.05 K/UL (ref 0–0.2)
BASOPHILS NFR BLD: 0.2 % (ref 0–1.9)
BASOPHILS NFR BLD: 0.3 % (ref 0–1.9)
BILIRUB SERPL-MCNC: 1.2 MG/DL (ref 0.1–1)
BLD GP AB SCN CELLS X3 SERPL QL: NORMAL
BUN SERPL-MCNC: 17 MG/DL (ref 8–23)
CALCIUM SERPL-MCNC: 9.5 MG/DL (ref 8.7–10.5)
CHLORIDE SERPL-SCNC: 98 MMOL/L (ref 95–110)
CO2 SERPL-SCNC: 30 MMOL/L (ref 23–29)
CREAT SERPL-MCNC: 1.2 MG/DL (ref 0.5–1.4)
DIFFERENTIAL METHOD: ABNORMAL
DIFFERENTIAL METHOD: ABNORMAL
EOSINOPHIL # BLD AUTO: 0 K/UL (ref 0–0.5)
EOSINOPHIL # BLD AUTO: 0.1 K/UL (ref 0–0.5)
EOSINOPHIL NFR BLD: 0.1 % (ref 0–8)
EOSINOPHIL NFR BLD: 0.9 % (ref 0–8)
ERYTHROCYTE [DISTWIDTH] IN BLOOD BY AUTOMATED COUNT: 12.5 % (ref 11.5–14.5)
ERYTHROCYTE [DISTWIDTH] IN BLOOD BY AUTOMATED COUNT: 12.7 % (ref 11.5–14.5)
ERYTHROCYTE [DISTWIDTH] IN BLOOD BY AUTOMATED COUNT: 12.7 % (ref 11.5–14.5)
EST. GFR  (NO RACE VARIABLE): >60 ML/MIN/1.73 M^2
GLUCOSE SERPL-MCNC: 138 MG/DL (ref 70–110)
HCT VFR BLD AUTO: 22.8 % (ref 40–54)
HCT VFR BLD AUTO: 25 % (ref 40–54)
HCT VFR BLD AUTO: 29.2 % (ref 40–54)
HGB BLD-MCNC: 7.8 G/DL (ref 14–18)
HGB BLD-MCNC: 8.5 G/DL (ref 14–18)
HGB BLD-MCNC: 9.8 G/DL (ref 14–18)
IMM GRANULOCYTES # BLD AUTO: 0.08 K/UL (ref 0–0.04)
IMM GRANULOCYTES # BLD AUTO: 0.08 K/UL (ref 0–0.04)
IMM GRANULOCYTES NFR BLD AUTO: 0.5 % (ref 0–0.5)
IMM GRANULOCYTES NFR BLD AUTO: 0.5 % (ref 0–0.5)
INR PPP: 1.1 (ref 0.8–1.2)
LACTATE SERPL-SCNC: 2 MMOL/L (ref 0.5–1.9)
LYMPHOCYTES # BLD AUTO: 1.4 K/UL (ref 1–4.8)
LYMPHOCYTES # BLD AUTO: 1.8 K/UL (ref 1–4.8)
LYMPHOCYTES NFR BLD: 11.8 % (ref 18–48)
LYMPHOCYTES NFR BLD: 8.8 % (ref 18–48)
MCH RBC QN AUTO: 27.6 PG (ref 27–31)
MCH RBC QN AUTO: 27.8 PG (ref 27–31)
MCH RBC QN AUTO: 27.9 PG (ref 27–31)
MCHC RBC AUTO-ENTMCNC: 33.6 G/DL (ref 32–36)
MCHC RBC AUTO-ENTMCNC: 34 G/DL (ref 32–36)
MCHC RBC AUTO-ENTMCNC: 34.2 G/DL (ref 32–36)
MCV RBC AUTO: 81 FL (ref 82–98)
MCV RBC AUTO: 82 FL (ref 82–98)
MCV RBC AUTO: 82 FL (ref 82–98)
MONOCYTES # BLD AUTO: 2.7 K/UL (ref 0.3–1)
MONOCYTES # BLD AUTO: 2.8 K/UL (ref 0.3–1)
MONOCYTES NFR BLD: 17.3 % (ref 4–15)
MONOCYTES NFR BLD: 17.4 % (ref 4–15)
NEUTROPHILS # BLD AUTO: 10.8 K/UL (ref 1.8–7.7)
NEUTROPHILS # BLD AUTO: 11.9 K/UL (ref 1.8–7.7)
NEUTROPHILS NFR BLD: 69.2 % (ref 38–73)
NEUTROPHILS NFR BLD: 73 % (ref 38–73)
NRBC BLD-RTO: 0 /100 WBC
NRBC BLD-RTO: 0 /100 WBC
PLATELET # BLD AUTO: 145 K/UL (ref 150–450)
PLATELET # BLD AUTO: 151 K/UL (ref 150–450)
PLATELET # BLD AUTO: 177 K/UL (ref 150–450)
PMV BLD AUTO: 12.9 FL (ref 9.2–12.9)
PMV BLD AUTO: 12.9 FL (ref 9.2–12.9)
PMV BLD AUTO: 13 FL (ref 9.2–12.9)
POTASSIUM SERPL-SCNC: 4.5 MMOL/L (ref 3.5–5.1)
PROT SERPL-MCNC: 7 G/DL (ref 6–8.4)
PROTHROMBIN TIME: 12.5 SEC (ref 9–12.5)
RBC # BLD AUTO: 2.81 M/UL (ref 4.6–6.2)
RBC # BLD AUTO: 3.05 M/UL (ref 4.6–6.2)
RBC # BLD AUTO: 3.55 M/UL (ref 4.6–6.2)
SODIUM SERPL-SCNC: 134 MMOL/L (ref 136–145)
SPECIMEN OUTDATE: NORMAL
WBC # BLD AUTO: 15.65 K/UL (ref 3.9–12.7)
WBC # BLD AUTO: 16.3 K/UL (ref 3.9–12.7)
WBC # BLD AUTO: 17.81 K/UL (ref 3.9–12.7)

## 2023-11-02 PROCEDURE — 97530 THERAPEUTIC ACTIVITIES: CPT

## 2023-11-02 PROCEDURE — 36415 COLL VENOUS BLD VENIPUNCTURE: CPT | Performed by: NURSE PRACTITIONER

## 2023-11-02 PROCEDURE — 25000003 PHARM REV CODE 250: Performed by: SURGERY

## 2023-11-02 PROCEDURE — 97165 OT EVAL LOW COMPLEX 30 MIN: CPT

## 2023-11-02 PROCEDURE — 86850 RBC ANTIBODY SCREEN: CPT | Performed by: SURGERY

## 2023-11-02 PROCEDURE — 99900035 HC TECH TIME PER 15 MIN (STAT)

## 2023-11-02 PROCEDURE — 85610 PROTHROMBIN TIME: CPT | Performed by: SURGERY

## 2023-11-02 PROCEDURE — 63600175 PHARM REV CODE 636 W HCPCS: Performed by: SURGERY

## 2023-11-02 PROCEDURE — 25000003 PHARM REV CODE 250: Performed by: NURSE PRACTITIONER

## 2023-11-02 PROCEDURE — 12000002 HC ACUTE/MED SURGE SEMI-PRIVATE ROOM

## 2023-11-02 PROCEDURE — 85027 COMPLETE CBC AUTOMATED: CPT | Performed by: SURGERY

## 2023-11-02 PROCEDURE — 25500020 PHARM REV CODE 255: Performed by: SURGERY

## 2023-11-02 PROCEDURE — 94799 UNLISTED PULMONARY SVC/PX: CPT

## 2023-11-02 PROCEDURE — 83605 ASSAY OF LACTIC ACID: CPT | Performed by: SURGERY

## 2023-11-02 PROCEDURE — 80053 COMPREHEN METABOLIC PANEL: CPT | Performed by: NURSE PRACTITIONER

## 2023-11-02 PROCEDURE — 85025 COMPLETE CBC W/AUTO DIFF WBC: CPT | Mod: 91 | Performed by: SURGERY

## 2023-11-02 PROCEDURE — 85025 COMPLETE CBC W/AUTO DIFF WBC: CPT | Performed by: NURSE PRACTITIONER

## 2023-11-02 PROCEDURE — 36415 COLL VENOUS BLD VENIPUNCTURE: CPT | Performed by: HOSPITALIST

## 2023-11-02 PROCEDURE — 25000003 PHARM REV CODE 250: Performed by: STUDENT IN AN ORGANIZED HEALTH CARE EDUCATION/TRAINING PROGRAM

## 2023-11-02 PROCEDURE — 97162 PT EVAL MOD COMPLEX 30 MIN: CPT

## 2023-11-02 PROCEDURE — 36415 COLL VENOUS BLD VENIPUNCTURE: CPT | Performed by: SURGERY

## 2023-11-02 RX ORDER — SODIUM CHLORIDE 9 MG/ML
INJECTION, SOLUTION INTRAVENOUS CONTINUOUS
Status: DISCONTINUED | OUTPATIENT
Start: 2023-11-02 | End: 2023-11-09 | Stop reason: HOSPADM

## 2023-11-02 RX ADMIN — HYDROCODONE BITARTRATE AND ACETAMINOPHEN 1 TABLET: 10; 325 TABLET ORAL at 12:11

## 2023-11-02 RX ADMIN — LIDOCAINE HYDROCHLORIDE 20 MG: 10 INJECTION, SOLUTION EPIDURAL; INFILTRATION; INTRACAUDAL; PERINEURAL at 12:11

## 2023-11-02 RX ADMIN — MORPHINE SULFATE 4 MG: 4 INJECTION, SOLUTION INTRAMUSCULAR; INTRAVENOUS at 12:11

## 2023-11-02 RX ADMIN — MORPHINE SULFATE 4 MG: 4 INJECTION, SOLUTION INTRAMUSCULAR; INTRAVENOUS at 07:11

## 2023-11-02 RX ADMIN — SODIUM CHLORIDE: 0.9 INJECTION, SOLUTION INTRAVENOUS at 04:11

## 2023-11-02 RX ADMIN — IOHEXOL 100 ML: 350 INJECTION, SOLUTION INTRAVENOUS at 07:11

## 2023-11-02 RX ADMIN — ACETAMINOPHEN 650 MG: 325 TABLET ORAL at 07:11

## 2023-11-02 RX ADMIN — ATORVASTATIN CALCIUM 10 MG: 10 TABLET, FILM COATED ORAL at 10:11

## 2023-11-02 RX ADMIN — PIPERACILLIN SODIUM AND TAZOBACTAM SODIUM 3.38 G: 3; .375 INJECTION, POWDER, LYOPHILIZED, FOR SOLUTION INTRAVENOUS at 11:11

## 2023-11-02 RX ADMIN — SODIUM CHLORIDE: 0.9 INJECTION, SOLUTION INTRAVENOUS at 08:11

## 2023-11-02 RX ADMIN — PIPERACILLIN SODIUM AND TAZOBACTAM SODIUM 3.38 G: 3; .375 INJECTION, POWDER, LYOPHILIZED, FOR SOLUTION INTRAVENOUS at 08:11

## 2023-11-02 RX ADMIN — PIPERACILLIN SODIUM AND TAZOBACTAM SODIUM 3.38 G: 3; .375 INJECTION, POWDER, LYOPHILIZED, FOR SOLUTION INTRAVENOUS at 04:11

## 2023-11-02 NOTE — PROGRESS NOTES
Call the bedside twice for oozing from wound, first time elected to observe but oozing from medial wound persisted despite pressure.  The wound was then prepped at the bedside and anesthetized with lidocaine. It was an sutures with a silk suture in a running fashion, as well as in u stitch over  drain. This provided adequate hemostasis.

## 2023-11-02 NOTE — PROGRESS NOTES
[facility]  Department of Neurology  Progress Note  Date: 2023 1:48 PM          Patient Name: Jos Espino   MRN: [id number]   : 1955    AGE: 68 y.o.    LOS: 1 days Hospital Day: 4  Admit date: 10/30/2023  7:00 PM       HPI per EMR: Jos Espino is a 68 y.o. male with a history of  presents emergency room with left pelvis/groin pain.  The patient states the pain began yesterday.  He does admit to attending exercise classes that includes exercising his legs with weights about 4 to 5 times a week at the [employer] exercise gym.  But he denied actual trauma.  The patient states his left thigh pain became excruciating with minimal range of motion so he came to emergency room for further evaluation        In the emergency room radiographic imaging revealed a Iliopsoas and left pelvic side wall intramuscular hematomas with a small amount of what is likely hemorrhage within the left retroperitoneum     General surgery was consulted and agreed to see the patient in consult.  The patient will be given pain management and monitored closely        Past medical history significant for hyperlipidemia ED and hypertension that is diet-controlled        The patient is not on anticoagulation and denies excessive NSAID use     Neurology consult:  Patient was seen and examined me.  States that he presented to the hospital for pain and weakness left groin/left lower extremity.  He states that it started yesterday and admits doing exercises with his legs.  Denies any recent trauma.  In the ER, he had a CT abdomen pelvis with contrast which revealed iliopsoas and left pelvic side intramuscular hematomas.  He is not been on any blood thinners.  General surgery consulted and recommended no surgical intervention.  He describes of numbness in the anterior thigh and leg on the left side and associated weakness in his left lower extremity proximal greater than distal.  Neurology consulted for numbness and  weakness.    11/01/2023: No acute events overnight. Patient was seen and examined by me this morning. Neuro exam worsened this morning.  He would significant weakness in his left lower extremity especially in hip flexion/extension and knee flexion/extension.    11/2/2023:  Patient was seen and examined by me.  He is status post hematoma evacuation.  He states that his left leg feels slightly better in terms of strength and sensation.       Vitals:  Patient Vitals for the past 24 hrs:   BP Temp Temp src Pulse Resp SpO2   11/02/23 0752 (!) 150/73 98.7 °F (37.1 °C) -- (!) 114 18 97 %   11/02/23 0500 113/71 99.9 °F (37.7 °C) -- (!) 117 18 98 %   11/02/23 0111 (!) 144/70 99.6 °F (37.6 °C) Oral 105 18 97 %   11/02/23 0018 -- -- -- -- 18 --   11/01/23 2305 132/76 (!) 100.8 °F (38.2 °C) Oral 104 18 97 %   11/01/23 2049 (!) 176/87 -- -- 92 18 95 %   11/01/23 1944 -- -- -- -- 18 --   11/01/23 1939 (!) 183/86 99.4 °F (37.4 °C) Oral 96 18 96 %   11/01/23 1725 -- -- -- -- 18 --   11/01/23 1700 (!) 186/78 -- -- 82 -- --   11/01/23 1600 (!) 164/72 -- -- -- -- --   11/01/23 1500 (!) 149/76 98.7 °F (37.1 °C) Oral 90 16 97 %   11/01/23 1359 (!) 156/84 98.2 °F (36.8 °C) Oral 76 16 96 %   11/01/23 1330 (!) 164/79 98 °F (36.7 °C) Temporal 83 11 97 %     PHYSICAL EXAM:     GENERAL APPEARANCE: Well-developed, well-nourished male in no acute distress.  HEENT: Normocephalic and atraumatic. PERRL. Oropharynx unremarkable.  PULM: Comfortable on room air.  CV: RRR.  ABDOMEN: Soft, nontender.  EXTREMITIES: No signs of vascular compromise. Pulses present. No cyanosis, clubbing or edema.  SKIN: Clear; no rashes, lesions or skin breaks in exposed areas.      NEURO:   MENTAL STATUS: Patient awake and oriented to time, place, and person. Affect normal.  CRANIAL NERVES II-XII: Pupils equal, round and reactive to light. Extraocular movements full and intact. No facial asymmetry.  MOTOR: Normal bulk. Tone normal and symmetrical throughout.  No abnormal  "movements. No tremor.   Strength 5/5 throughout except left lower extremity hip flexion and extension 2/5, knee flexion and extension 3/5, ankle dorsiflexion and plantar flexion 3+/5.  REFLEXES: DTRs 1+ throughout and absent on left patellar  SENSATION:  Decreased sensation to light touch on the left anterior thigh and anterior leg.  COORDINATION: Finger-to-nose normal for age and symmetric.  STATION: Romberg deferred.  GAIT: Deferred.    CURRENT SCHEDULED MEDICATIONS:   atorvastatin  10 mg Oral Daily    fentaNYL  1 patch Transdermal Q72H    piperacillin-tazobactam (Zosyn) IV (PEDS and ADULTS) (extended infusion is not appropriate)  3.375 g Intravenous Q8H     CURRENT INFUSIONS:   sodium chloride 0.9% 125 mL/hr at 11/02/23 0822     DATA:  Recent Labs   Lab 10/30/23  1934 10/31/23  0421 11/01/23  0238 11/02/23  0538    137 137 134*   K 3.8 4.2 3.9 4.5    105 102 98   CO2 26 25 30* 30*   BUN 13 11 14 17   CREATININE 1.1 1.1 1.0 1.2   * 153* 120* 138*   CALCIUM 10.0 9.9 9.8 9.5   AST 22 18 30 82*   ALT 15 15 17 30     Recent Labs   Lab 10/30/23  1934 10/31/23  0421 11/01/23  0238 11/01/23  2320 11/02/23  0539   WBC 9.73 12.73* 12.90* 15.86* 16.30*   HGB 12.9* 12.3* 11.6* 10.2* 9.8*   HCT 38.4* 35.4* 34.5* 30.2* 29.2*    189 206 180 177     No results found for: "PROTEINCSF", "GLUCCSF", "WBCCSF", "RBCCSF", "PMNCSF"  No results found for: "HGBA1C"         I have personally reviewed and interpreted the pertinent imaging and lab results.  Imaging Results              CT Abdomen Pelvis With IV Contrast (Final result)  Result time 10/30/23 20:42:37      Final result by Luke Britt MD (10/30/23 20:42:37)                   Narrative:    CT ABDOMEN PELVIS WITH IV CONTRAST    COMPARISONS:  none.    ADDITIONAL PERTINENT HISTORY:   Flank pain with possible kidney stone    TECHNIQUE:   Multiple axial images were obtained from the lung bases through the lesser trochanters after the adminstration of " intravenous contrast.  One of the following dose optimization techniques was utilized in the performance of this exam: Automated exposure control; adjustment of the mA and/or kV according to the patient's size; or use of an iterative  reconstruction technique.  Specific details can be referenced in the facility's radiology CT exam operational policy.    CONTRAST:   100 mL of IV Omnipaque 350.    FINDINGS:  Lung bases: negative    Free air/free fluid: Small amount of free fluid within the inferior aspects of the left retroperitoneum and extending along the left pelvic sidewall.  Liver:  negative  Spleen: negative  Adrenal glands: negative  Kidneys /ureters/urinary bladder: negative  Pancreas: negative  Gall Bladder:   negative    Bowel / mesentery: Negative including a normal-appearing appendix in the right lower quadrant.    Lymph node assessment: negative    Pelvic contents: negative  Abdominal vasculature: Atherosclerotic disease of the abdominal aorta and its major branches.    Surrounding soft tissues: Enlargement of the left iliopsoas musculature is well as the left pelvic sidewall musculature compatible with intramuscular hematomas.  Osseous structures: negative      IMPRESSION:    1. Iliopsoas and left pelvic side wall intramuscular hematomas with a small amount of what is likely hemorrhage within the left retroperitoneum. No active extravasation of contrast noted at the time of the CT scan..  2. No other acute intra-abdominal or intrapelvic process.    Electronically signed by:  Luke Britt MD  10/30/2023 08:42 PM CDT Workstation: [id number]                                            ASSESSMENT AND PLAN:     Entrapment neuropathy  Intramuscular and retroperitoneal hematoma        Plan:   Patient presented with numbness/pain and weakness in his left lower extremity.  On exam, he has numbness in the anterior femoral cutaneous nerve and saphenous nerve distribution and weakness in hip flexion/extension, knee  flexion and extension.  Symptoms likely secondary to entrapment neuropathy in the setting of hematoma  Status post hematoma evacuation on 11/01.  Somewhat improvement noted in weakness and sensation.  General surgery following  Avoid any blood thinners and antiplatelet at this time.  PT OT evaluate and treat  DVT prophylaxis with SCds  Will follow             Johnathon Reddy MD  Neurology/vascular Neurology  Date of Service: 11/02/2023  1:48 PM    Please note: This note was transcribed using voice recognition software. Because of this technology there are often uinintended grammatical, spelling, and other transcription errors. Please disregard these errors.

## 2023-11-02 NOTE — ASSESSMENT & PLAN NOTE
Neuropathy seems better  Hematoma recurred but doesn't seem to be compressing nerve as much as before, drain left at level inguinal ligament  Would not re operate on hematoma unless further complications develop as it will just re cur  Check CBC serially and transfuse as needed  Started zosyn for low grade fevers but no clear signs of infection of hematoma

## 2023-11-02 NOTE — NURSING
1843: New dressing applied by previous RN. Site noted to be oozing. Sanguineous drainage also noted to dressing of SARAH drain incision. Dr. Farias contacted; awaiting response.    1920: Dressing noted to be saturated. Pt remains AAOx4. No neuro deficits. Able to move extremities. Pressure applied to site. Dressing change performed. Pt c./o of 9/10 pain to LLE and L flank. 1934 Dr. Farias notified and en route.    1941: Dinora at bedside. No new orders received at this time. New dressing applied per Dr. Farias. See MAR for administered PRNs. Care remains ongoing.     2049: Dressing saturated. New pressure dressing applied. VSS. No other needs voiced at this time.     2301: Pt still requiring frequent changes secondary to constant oozing. 2324 Dr. Farias notified and en route. See orders.

## 2023-11-02 NOTE — SUBJECTIVE & OBJECTIVE
Interval History:  Drain in place and he believes the numbness in the leg and swelling are improved.  Had seizure place last night for uncontrollable bleeding and this seems resolved.  No lightheadedness, chest pain, shortness for breath.    Review of Systems Complete ROS otherwise negative other than stated in HPI.   Objective:     Vital Signs (Most Recent):  Temp: 98.7 °F (37.1 °C) (11/02/23 1100)  Pulse: (!) 116 (11/02/23 1100)  Resp: 18 (11/02/23 1100)  BP: (!) 143/77 (11/02/23 1100)  SpO2: 98 % (11/02/23 1100) Vital Signs (24h Range):  Temp:  [98.7 °F (37.1 °C)-100.8 °F (38.2 °C)] 98.7 °F (37.1 °C)  Pulse:  [] 116  Resp:  [16-18] 18  SpO2:  [95 %-98 %] 98 %  BP: (113-186)/(70-87) 143/77     Weight: 81.6 kg (180 lb)  Body mass index is 25.83 kg/m².    Intake/Output Summary (Last 24 hours) at 11/2/2023 1438  Last data filed at 11/2/2023 1302  Gross per 24 hour   Intake 600 ml   Output 1500 ml   Net -900 ml         Physical Exam  GENERAL:  Alert and oriented x 3  HEENT:  EOMI. Conjunctivae intact. Posterior pharynx clear, oral mucosa moist  NECK:  Supple   LUNGS:  No respiratory distress. Clear to auscultation bilaterally with good air movement  CARDIAC:  RRR without murmur, rub or gallop  ABDOMEN:  Soft,  Nontender and nondistended, no rebound or guarding, bowel sounds present   EXTREMITIES:  Peripheral pulses are 2+. Hands and feet are warm. Good capillary refill in fingers (< 2 seconds). No clubbing, cyanosis or edema.  Left groin hematoma with dressing intact no surrounding skin changes drain in place with bloody drainage  SKIN:  Skin color, texture and turgor normal. No rashes, ulcerations or nodules noted  NEUROLOGIC:  CN II-XII intact. Light touch sensation intact. Muscle strength 5/5 in all extremities          Significant Labs: All pertinent labs within the past 24 hours have been reviewed.  BMP:   Recent Labs   Lab 11/02/23  0538   *   *   K 4.5   CL 98   CO2 30*   BUN 17   CREATININE  1.2   CALCIUM 9.5     CBC:   Recent Labs   Lab 11/01/23 0238 11/01/23  2320 11/02/23  0539   WBC 12.90* 15.86* 16.30*   HGB 11.6* 10.2* 9.8*   HCT 34.5* 30.2* 29.2*    180 177     CMP:   Recent Labs   Lab 11/01/23 0238 11/02/23  0538    134*   K 3.9 4.5    98   CO2 30* 30*   * 138*   BUN 14 17   CREATININE 1.0 1.2   CALCIUM 9.8 9.5   PROT 7.5 7.0   ALBUMIN 4.2 3.7   BILITOT 1.2* 1.2*   ALKPHOS 53* 46*   AST 30 82*   ALT 17 30   ANIONGAP 5* 6*       Significant Imaging: I have reviewed all pertinent imaging results/findings within the past 24 hours.

## 2023-11-02 NOTE — SUBJECTIVE & OBJECTIVE
Interval History: tachycardia and low grade fevers overnight, suture of wound for bleeding  Ct done in morning  Getting more mobility out of leg but still difficult to move    Medications:  Continuous Infusions:   sodium chloride 0.9% 125 mL/hr at 11/02/23 1645     Scheduled Meds:   atorvastatin  10 mg Oral Daily    fentaNYL  1 patch Transdermal Q72H    piperacillin-tazobactam (Zosyn) IV (PEDS and ADULTS) (extended infusion is not appropriate)  3.375 g Intravenous Q8H     PRN Meds:acetaminophen, HYDROcodone-acetaminophen, HYDROcodone-acetaminophen, melatonin, morphine, ondansetron, sodium chloride 0.9%     Review of patient's allergies indicates:  No Known Allergies  Objective:     Vital Signs (Most Recent):  Temp: 99 °F (37.2 °C) (11/02/23 1629)  Pulse: 107 (11/02/23 1629)  Resp: 18 (11/02/23 1629)  BP: (!) 144/78 (11/02/23 1629)  SpO2: 98 % (11/02/23 1629) Vital Signs (24h Range):  Temp:  [98.7 °F (37.1 °C)-100.8 °F (38.2 °C)] 99 °F (37.2 °C)  Pulse:  [] 107  Resp:  [18] 18  SpO2:  [95 %-98 %] 98 %  BP: (113-186)/(70-87) 144/78     Weight: 81.6 kg (180 lb)  Body mass index is 25.83 kg/m².    Intake/Output - Last 3 Shifts         10/31 0700  11/01 0659 11/01 0700 11/02 0659 11/02 0700  11/03 0659    P.O. 360 940     IV Piggyback  1300     Total Intake(mL/kg) 360 (4.4) 2240 (27.5)     Urine (mL/kg/hr)  950 (0.5) 700 (0.9)    Drains  80 10    Total Output  1030 710    Net +360 +1210 -710           Urine Occurrence 2 x               Physical Exam  Abdominal:      Comments: Drain ss  Some clots  Strength much better in leg          Significant Labs:  I have reviewed all pertinent lab results within the past 24 hours.  CBC:   Recent Labs   Lab 11/02/23  1530   WBC 17.81*   RBC 3.05*   HGB 8.5*   HCT 25.0*      MCV 82   MCH 27.9   MCHC 34.0     BMP:   Recent Labs   Lab 11/02/23  0538   *   *   K 4.5   CL 98   CO2 30*   BUN 17   CREATININE 1.2   CALCIUM 9.5       Significant Diagnostics:  I have  reviewed all pertinent imaging results/findings within the past 24 hours.    CT abd pelvis and thigh shows re accumulation of hematoma, drain sits at inferior edge of hematoma

## 2023-11-02 NOTE — PT/OT/SLP EVAL
"Occupational Therapy   Evaluation    Name: Jos Espino  MRN: [id number]  Admitting Diagnosis: Nontraumatic psoas hematoma  Recent Surgery: Procedure(s) (LRB):  INCISION AND DRAINAGE, HEMATOMA (Left) 1 Day Post-Op    Recommendations:     Discharge Recommendations: Low Intensity Therapy  Discharge Equipment Recommendations:  walker, rolling  Barriers to discharge:  None    Assessment:     Jos Espino is a 68 y.o. male with a medical diagnosis of Nontraumatic psoas hematoma.  Performance deficits affecting function: impaired endurance, weakness, impaired self care skills, impaired functional mobility, gait instability, impaired balance, pain.      Pt found up in chair; he was agreeable to practicing functional mobility and toilet transfer with RW; pt reported pain as "controlled" both before and after session; pt limited by LLE weakness.    Rehab Prognosis: Good; patient would benefit from acute skilled OT services to address these deficits and reach maximum level of function.       Plan:     Patient to be seen 5 x/week to address the above listed problems via self-care/home management, therapeutic activities, therapeutic exercises  Plan of Care Expires: 12/02/23  Plan of Care Reviewed with: patient    Subjective     Chief Complaint: decreased LLE function  Patient/Family Comments/goals: return to PLOF    Occupational Profile:  Living Environment: Lives with his wife SSH no steps; walk-in shower with seat  Previous level of function: Independent  Equipment Used at Home: shower chair  Assistance upon Discharge: wife    Pain/Comfort:  Pain Rating 1:  (pt reports pain is "controlled")  Pain Rating Post-Intervention 1:  (no change)    Objective:     Communicated with: nurse prior to session.  Patient found up in chair with SARAH drain, telemetry upon OT entry to room.    General Precautions: Standard, fall  Orthopedic Precautions: N/A  Braces: N/A  Respiratory Status: Room air    Occupational Performance:    Functional " Mobility/Transfers:  Patient completed Sit <> Stand Transfer with contact guard assistance  with  rolling walker   Patient completed Toilet Transfer Stand Pivot technique with contact guard assistance with  rolling walker and grab bars  Functional Mobility: CGA with RW from chair to/from bathroom    Cognitive/Visual Perceptual:  Cognitive/Psychosocial Skills:     -       Oriented to: Person, Place, Time, and Situation   -       Follows Commands/attention:Follows multistep  commands  -       Safety awareness/insight to disability: intact     Physical Exam:  Upper Extremity Range of Motion:     -       Right Upper Extremity: WNL  -       Left Upper Extremity: WNL  Upper Extremity Strength:    -       Right Upper Extremity: WNL  -       Left Upper Extremity: WNL   Strength:    -       Right Upper Extremity: WNL  -       Left Upper Extremity: WNL  Fine Motor Coordination:    -       Intact  Gross motor coordination:   WFL    AMPAC 6 Click ADL:  AMPAC Total Score: 21    Treatment & Education:  Therapist provided facilitation and instruction of proper body mechanics and fall prevention strategies during tasks listed above.   Instructed patient to sit in bedside chair as tolerated daily to increase OOB/activity tolerance.   Instructed patient to use call light to have nursing staff assist with needs/transfers.   Discussed OT POC and answered all questions within OT scope of practice.    Patient left up in chair with all lines intact and call button in reach    GOALS:   Multidisciplinary Problems       Occupational Therapy Goals          Problem: Occupational Therapy    Goal Priority Disciplines Outcome Interventions   Occupational Therapy Goal     OT, PT/OT     Description: Goals to be met by: 12/2/23     Patient will increase functional independence with ADLs by performing:    LE Dressing with Modified [place] using AD as needed.  Grooming while standing at sink with Modified [place].  Toileting from toilet  with Modified [place] for hygiene and clothing management.   Toilet transfer to toilet with Modified [place].                         History:     Past Medical History:   Diagnosis Date    Hyperlipidemia          Past Surgical History:   Procedure Laterality Date    INCISION AND DRAINAGE OF HEMATOMA Left 11/1/2023    Procedure: INCISION AND DRAINAGE, HEMATOMA;  Surgeon: Melba Farias MD;  Location: [facility] OR;  Service: General;  Laterality: Left;    TRANSURETHRAL RESECTION OF PROSTATE N/A 4/7/2022    Procedure: TURP (TRANSURETHRAL RESECTION OF PROSTATE);  Surgeon: Ricky Rousseau Jr., MD;  Location: [facility];  Service: Urology;  Laterality: N/A;       Time Tracking:     OT Date of Treatment: 11/02/23  OT Start Time: 1024  OT Stop Time: 1044  OT Total Time (min): 20 min    Billable Minutes:Evaluation 10  Therapeutic Activity 10    11/2/2023

## 2023-11-02 NOTE — NURSING
Transferred to unit in stable condition. Oriented to room. Call bell within reach. Bandage to left groin c/d/I. SARAH drain noted with dark blood with clots. Voiced pain is a 1 to left groin. Comfortable. Informed to call for needs. Bed alarm placed.

## 2023-11-02 NOTE — PROGRESS NOTES
[facility]  General Surgery  Progress Note    Subjective:     History of Present Illness:  67 y/o male presents with left-sided abdominal pain radiating down to his leg.  He does note some left thigh and knee numbness as well.  He notes this has been present starting over about 24-48 hrs.  He denies any traumatic events and does not take any blood thinners.  He does report he has been exercising more and specifically doing leg presses.      Post-Op Info:  Procedure(s) (LRB):  INCISION AND DRAINAGE, HEMATOMA (Left)   1 Day Post-Op     Interval History: tachycardia and low grade fevers overnight, suture of wound for bleeding  Ct done in morning  Getting more mobility out of leg but still difficult to move    Medications:  Continuous Infusions:   sodium chloride 0.9% 125 mL/hr at 11/02/23 1645     Scheduled Meds:   atorvastatin  10 mg Oral Daily    fentaNYL  1 patch Transdermal Q72H    piperacillin-tazobactam (Zosyn) IV (PEDS and ADULTS) (extended infusion is not appropriate)  3.375 g Intravenous Q8H     PRN Meds:acetaminophen, HYDROcodone-acetaminophen, HYDROcodone-acetaminophen, melatonin, morphine, ondansetron, sodium chloride 0.9%     Review of patient's allergies indicates:  No Known Allergies  Objective:     Vital Signs (Most Recent):  Temp: 99 °F (37.2 °C) (11/02/23 1629)  Pulse: 107 (11/02/23 1629)  Resp: 18 (11/02/23 1629)  BP: (!) 144/78 (11/02/23 1629)  SpO2: 98 % (11/02/23 1629) Vital Signs (24h Range):  Temp:  [98.7 °F (37.1 °C)-100.8 °F (38.2 °C)] 99 °F (37.2 °C)  Pulse:  [] 107  Resp:  [18] 18  SpO2:  [95 %-98 %] 98 %  BP: (113-186)/(70-87) 144/78     Weight: 81.6 kg (180 lb)  Body mass index is 25.83 kg/m².    Intake/Output - Last 3 Shifts         10/31 0700 11/01 0659 11/01 0700 11/02 0659 11/02 0700 11/03 0659    P.O. 360 940     IV Piggyback  1300     Total Intake(mL/kg) 360 (4.4) 2240 (27.5)     Urine (mL/kg/hr)  950 (0.5) 700 (0.9)    Drains  80 10    Total Output  1030  710    Net +360 +1210 -710           Urine Occurrence 2 x               Physical Exam  Abdominal:      Comments: Drain ss  Some clots  Strength much better in leg          Significant Labs:  I have reviewed all pertinent lab results within the past 24 hours.  CBC:   Recent Labs   Lab 11/02/23  1530   WBC 17.81*   RBC 3.05*   HGB 8.5*   HCT 25.0*      MCV 82   MCH 27.9   MCHC 34.0     BMP:   Recent Labs   Lab 11/02/23  0538   *   *   K 4.5   CL 98   CO2 30*   BUN 17   CREATININE 1.2   CALCIUM 9.5       Significant Diagnostics:  I have reviewed all pertinent imaging results/findings within the past 24 hours.    CT abd pelvis and thigh shows re accumulation of hematoma, drain sits at inferior edge of hematoma    Assessment/Plan:     * Nontraumatic psoas hematoma  Neuropathy seems better  Hematoma recurred but doesn't seem to be compressing nerve as much as before, drain left at level inguinal ligament  Would not re operate on hematoma unless further complications develop as it will just re cur  Check CBC serially and transfuse as needed  Started zosyn for low grade fevers but no clear signs of infection of hematoma        Melba Farias MD  General Surgery  [facility]

## 2023-11-02 NOTE — NURSING
Nurses Note -- 4 Eyes      11/2/2023   3:38 AM      Skin assessed during: Transfer      [] No Altered Skin Integrity Present    []Prevention Measures Documented      [x] Yes- Altered Skin Integrity Present or Discovered   [] LDA Added if Not in Epic (Describe Wound)   [] New Altered Skin Integrity was Present on Admit and Documented in LDA   [] Wound Image Taken  Incision to left groin along with ASRAH drain already on [place]  Wound Care Consulted? No    Attending Nurse:  Roberta Clay RN/Staff Member:     Tanvir

## 2023-11-02 NOTE — PROGRESS NOTES
Former[facility] Medicine  Progress Note    Patient Name: Jos Espino  MRN: [id number]  Patient Class: IP- Inpatient   Admission Date: 10/30/2023  Length of Stay: 1 days  Attending Physician: Marily Crawley MD  Primary Care Provider: Abiel Salguero MD        Subjective:     Principal Problem:Nontraumatic psoas hematoma        HPI:  68 year old  male presents emergency room with left pelvis/groin pain.  The patient states the pain began yesterday.  He does admit to attending exercise classes that includes exercising his legs with weights about 4 to 5 times a week at the [employer] exercise gym.  But he denied actual trauma.  The patient states his left thigh pain became excruciating with minimal range of motion so he came to emergency room for further evaluation. In the emergency room radiographic imaging revealed a Iliopsoas and left pelvic side wall intramuscular hematomas with a small amount of what is likely hemorrhage within the left retroperitoneum. General surgery was consulted and agreed to see the patient in consult.  The patient will be given pain management and monitored closely. Past medical history significant for hyperlipidemia ED and hypertension that is diet-controlled. The patient is not on anticoagulation and denies excessive NSAID use      Overview/Hospital Course:  68M with PMH HTN, HLD, and BPH s/p TURP is admitted with nontraumatic hematoma to left iliopsoas and pelvic wall. Hemoglobin monitored. Gen surg consulted. Neurology consulted as well for numbness to LLE. Taken to OR for hematoma evacuation with general surgery to inability to extend left knee. Drain in place.      Interval History:  Drain in place and he believes the numbness in the leg and swelling are improved.  Had seizure place last night for uncontrollable bleeding and this seems resolved.  No lightheadedness, chest pain, shortness for breath.    Review of Systems Complete ROS otherwise negative  other than stated in HPI.   Objective:     Vital Signs (Most Recent):  Temp: 98.7 °F (37.1 °C) (11/02/23 1100)  Pulse: (!) 116 (11/02/23 1100)  Resp: 18 (11/02/23 1100)  BP: (!) 143/77 (11/02/23 1100)  SpO2: 98 % (11/02/23 1100) Vital Signs (24h Range):  Temp:  [98.7 °F (37.1 °C)-100.8 °F (38.2 °C)] 98.7 °F (37.1 °C)  Pulse:  [] 116  Resp:  [16-18] 18  SpO2:  [95 %-98 %] 98 %  BP: (113-186)/(70-87) 143/77     Weight: 81.6 kg (180 lb)  Body mass index is 25.83 kg/m².    Intake/Output Summary (Last 24 hours) at 11/2/2023 1438  Last data filed at 11/2/2023 1302  Gross per 24 hour   Intake 600 ml   Output 1500 ml   Net -900 ml         Physical Exam  GENERAL:  Alert and oriented x 3  HEENT:  EOMI. Conjunctivae intact. Posterior pharynx clear, oral mucosa moist  NECK:  Supple   LUNGS:  No respiratory distress. Clear to auscultation bilaterally with good air movement  CARDIAC:  RRR without murmur, rub or gallop  ABDOMEN:  Soft,  Nontender and nondistended, no rebound or guarding, bowel sounds present   EXTREMITIES:  Peripheral pulses are 2+. Hands and feet are warm. Good capillary refill in fingers (< 2 seconds). No clubbing, cyanosis or edema.  Left groin hematoma with dressing intact no surrounding skin changes drain in place with bloody drainage  SKIN:  Skin color, texture and turgor normal. No rashes, ulcerations or nodules noted  NEUROLOGIC:  CN II-XII intact. Light touch sensation intact. Muscle strength 5/5 in all extremities          Significant Labs: All pertinent labs within the past 24 hours have been reviewed.  BMP:   Recent Labs   Lab 11/02/23  0538   *   *   K 4.5   CL 98   CO2 30*   BUN 17   CREATININE 1.2   CALCIUM 9.5     CBC:   Recent Labs   Lab 11/01/23  0238 11/01/23  2320 11/02/23  0539   WBC 12.90* 15.86* 16.30*   HGB 11.6* 10.2* 9.8*   HCT 34.5* 30.2* 29.2*    180 177     CMP:   Recent Labs   Lab 11/01/23  0238 11/02/23  0538    134*   K 3.9 4.5    98   CO2 30*  30*   * 138*   BUN 14 17   CREATININE 1.0 1.2   CALCIUM 9.8 9.5   PROT 7.5 7.0   ALBUMIN 4.2 3.7   BILITOT 1.2* 1.2*   ALKPHOS 53* 46*   AST 30 82*   ALT 17 30   ANIONGAP 5* 6*       Significant Imaging: I have reviewed all pertinent imaging results/findings within the past 24 hours.      Assessment/Plan:      * Nontraumatic psoas hematoma  Likely from exercise.  CT today shows ongoing acute/subacute bleeding.  Required suture last night for bleeding.  Hemodynamically stable.  Had hematoma evacuation 11/1.  Monitor CBC.  Supportive care.  Drain in place, management per surgery       Hypercholesteremia  continue statin  VTE Risk Mitigation (From admission, onward)         Ordered     IP VTE LOW RISK PATIENT  Once         10/31/23 0209                Discharge Planning   MAGDALENA: 11/3/2023     Code Status: Full Code   Is the patient medically ready for discharge?:     Reason for patient still in hospital (select all that apply): Patient trending condition and Treatment  Discharge Plan A: Home with family                  Marily Crawley MD  Department of Hospital Medicine   [facility]

## 2023-11-02 NOTE — PT/OT/SLP EVAL
Physical Therapy Evaluation    Patient Name:  Jos Espino   MRN:  [id number]    Recommendations:     Discharge Recommendations: Low Intensity Therapy   Discharge Equipment Recommendations: walker, rolling   Barriers to discharge:  decrease left LE function, high fall risk, pain, decrease activity tolerance, increase assist with mobility    Assessment:     Jos Espino is a 68 y.o. male admitted with a medical diagnosis of Nontraumatic psoas hematoma.  He presents with the following impairments/functional limitations: weakness, impaired endurance, impaired self care skills, impaired functional mobility, gait instability, impaired balance, decreased lower extremity function, decreased safety awareness, pain, impaired cardiopulmonary response to activity.    Pt found in bed with HOB elevated. Pt requires self assist with bed mobility to move LLE to EOB due to significant weakness. No hip flexion or knee ext. Pt with limited tolerance for LLE WB encouraged WBAT. Increase pain to 7/10 with mobility due agreeable to up in chair at end of session.     Rehab Prognosis: Fair; patient would benefit from acute skilled PT services to address these deficits and reach maximum level of function.    Recent Surgery: Procedure(s) (LRB):  INCISION AND DRAINAGE, HEMATOMA (Left) 1 Day Post-Op    Plan:     During this hospitalization, patient to be seen daily to address the identified rehab impairments via gait training, therapeutic activities, therapeutic exercises, neuromuscular re-education and progress toward the following goals:    Plan of Care Expires:  12/02/23    Subjective     Chief Complaint: decrease LLE function  Patient/Family Comments/goals: return to PLOF  Pain/Comfort:  Pain Rating 1: 2/10  Location - Side 1: Left  Location 1: leg  Pain Addressed 1: Reposition, Distraction, Cessation of Activity  Pain Rating Post-Intervention 1: 7/10 (post ambulation)    Patients cultural, spiritual, Druze conflicts given the current  situation: no    Living Environment:  Pt lives with his wife in a one story home with no AMANDA.   Prior to admission, patients level of function was Independent.  Equipment used at home: none .  DME owned (not currently used): none.  Upon discharge, patient will have assistance from wife.    Objective:     Communicated with RN prior to session.  Patient found HOB elevated with peripheral IV, telemetry, SARAH drain  upon PT entry to room.    General Precautions: Standard, fall  Orthopedic Precautions:N/A   Braces: N/A  Respiratory Status: Room air    Exams:  Cognitive Exam:  Patient is oriented to Person, Place, Time, and Situation  RLE ROM: WFL  RLE Strength: WFL  LLE ROM: PROM WFL  LLE Strength: ankle DF 4/5, hip flexion 0/5, knee ext 0/5    Functional Mobility:  Bed Mobility:     Supine to Sit: minimum assistance  Transfers:     Sit to Stand:  minimum assistance with rolling walker  Gait: 40 ft with RW and min A due to decrease LLE function      AM-PAC 6 CLICK MOBILITY  Total Score:15       Treatment & Education:  Pt educated on POC, discharge recommendation, importance of time OOB, WB through LLE as tolerated, need for assist with mobility, use of call bell to seek assistance as needed and fall prevention      Patient left up in chair with all lines intact, call button in reach, and OT notified.    GOALS:   Multidisciplinary Problems       Physical Therapy Goals          Problem: Physical Therapy    Goal Priority Disciplines Outcome Goal Variances Interventions   Physical Therapy Goal     PT, PT/OT Ongoing, Progressing     Description: Goals to be met by: 23     Patient will increase functional independence with mobility by performin. Supine to sit with Supervision  2. Sit to stand transfer with Supervision  3. Bed to chair transfer with Supervision using Rolling Walker  4. Gait  x 150 feet with Supervision using Rolling Walker.                              History:     Past Medical History:   Diagnosis  Date    Hyperlipidemia        Past Surgical History:   Procedure Laterality Date    INCISION AND DRAINAGE OF HEMATOMA Left 11/1/2023    Procedure: INCISION AND DRAINAGE, HEMATOMA;  Surgeon: Melba Farias MD;  Location[facility] OR;  Service: General;  Laterality: Left;    TRANSURETHRAL RESECTION OF PROSTATE N/A 4/7/2022    Procedure: TURP (TRANSURETHRAL RESECTION OF PROSTATE);  Surgeon: Ricky Rousseau Jr., MD;  Location: [facility];  Service: Urology;  Laterality: N/A;       Time Tracking:     PT Received On: 11/02/23  PT Start Time: 0935     PT Stop Time: 0947  PT Total Time (min): 12 min     Billable Minutes: Evaluation 12      11/02/2023

## 2023-11-03 LAB
ALBUMIN SERPL BCP-MCNC: 3.3 G/DL (ref 3.5–5.2)
ALP SERPL-CCNC: 42 U/L (ref 55–135)
ALT SERPL W/O P-5'-P-CCNC: 39 U/L (ref 10–44)
ANION GAP SERPL CALC-SCNC: 1 MMOL/L (ref 8–16)
AST SERPL-CCNC: 93 U/L (ref 10–40)
BASOPHILS # BLD AUTO: 0.06 K/UL (ref 0–0.2)
BASOPHILS NFR BLD: 0.4 % (ref 0–1.9)
BILIRUB SERPL-MCNC: 1.2 MG/DL (ref 0.1–1)
BUN SERPL-MCNC: 20 MG/DL (ref 8–23)
CALCIUM SERPL-MCNC: 8.8 MG/DL (ref 8.7–10.5)
CHLORIDE SERPL-SCNC: 101 MMOL/L (ref 95–110)
CO2 SERPL-SCNC: 31 MMOL/L (ref 23–29)
CREAT SERPL-MCNC: 1.2 MG/DL (ref 0.5–1.4)
DIFFERENTIAL METHOD: ABNORMAL
EOSINOPHIL # BLD AUTO: 0.2 K/UL (ref 0–0.5)
EOSINOPHIL NFR BLD: 1.4 % (ref 0–8)
ERYTHROCYTE [DISTWIDTH] IN BLOOD BY AUTOMATED COUNT: 12.5 % (ref 11.5–14.5)
EST. GFR  (NO RACE VARIABLE): >60 ML/MIN/1.73 M^2
GLUCOSE SERPL-MCNC: 116 MG/DL (ref 70–110)
HCT VFR BLD AUTO: 23.8 % (ref 40–54)
HGB BLD-MCNC: 8.2 G/DL (ref 14–18)
IMM GRANULOCYTES # BLD AUTO: 0.1 K/UL (ref 0–0.04)
IMM GRANULOCYTES NFR BLD AUTO: 0.6 % (ref 0–0.5)
LYMPHOCYTES # BLD AUTO: 1.2 K/UL (ref 1–4.8)
LYMPHOCYTES NFR BLD: 7.2 % (ref 18–48)
MCH RBC QN AUTO: 28 PG (ref 27–31)
MCHC RBC AUTO-ENTMCNC: 34.5 G/DL (ref 32–36)
MCV RBC AUTO: 81 FL (ref 82–98)
MONOCYTES # BLD AUTO: 2.6 K/UL (ref 0.3–1)
MONOCYTES NFR BLD: 15.4 % (ref 4–15)
NEUTROPHILS # BLD AUTO: 12.4 K/UL (ref 1.8–7.7)
NEUTROPHILS NFR BLD: 75 % (ref 38–73)
NRBC BLD-RTO: 0 /100 WBC
PLATELET # BLD AUTO: 146 K/UL (ref 150–450)
PMV BLD AUTO: 12.8 FL (ref 9.2–12.9)
POTASSIUM SERPL-SCNC: 4.3 MMOL/L (ref 3.5–5.1)
PROT SERPL-MCNC: 6.2 G/DL (ref 6–8.4)
RBC # BLD AUTO: 2.93 M/UL (ref 4.6–6.2)
SODIUM SERPL-SCNC: 133 MMOL/L (ref 136–145)
WBC # BLD AUTO: 16.57 K/UL (ref 3.9–12.7)

## 2023-11-03 PROCEDURE — 36415 COLL VENOUS BLD VENIPUNCTURE: CPT | Performed by: NURSE PRACTITIONER

## 2023-11-03 PROCEDURE — 97116 GAIT TRAINING THERAPY: CPT

## 2023-11-03 PROCEDURE — 25000003 PHARM REV CODE 250: Performed by: NURSE PRACTITIONER

## 2023-11-03 PROCEDURE — 85025 COMPLETE CBC W/AUTO DIFF WBC: CPT | Performed by: NURSE PRACTITIONER

## 2023-11-03 PROCEDURE — 97530 THERAPEUTIC ACTIVITIES: CPT

## 2023-11-03 PROCEDURE — 87040 BLOOD CULTURE FOR BACTERIA: CPT | Performed by: HOSPITALIST

## 2023-11-03 PROCEDURE — 80053 COMPREHEN METABOLIC PANEL: CPT | Performed by: NURSE PRACTITIONER

## 2023-11-03 PROCEDURE — 25000003 PHARM REV CODE 250: Performed by: SURGERY

## 2023-11-03 PROCEDURE — 36415 COLL VENOUS BLD VENIPUNCTURE: CPT | Performed by: HOSPITALIST

## 2023-11-03 PROCEDURE — 63600175 PHARM REV CODE 636 W HCPCS: Performed by: SURGERY

## 2023-11-03 PROCEDURE — 12000002 HC ACUTE/MED SURGE SEMI-PRIVATE ROOM

## 2023-11-03 RX ORDER — MORPHINE SULFATE 2 MG/ML
2 INJECTION, SOLUTION INTRAMUSCULAR; INTRAVENOUS EVERY 4 HOURS PRN
Status: DISCONTINUED | OUTPATIENT
Start: 2023-11-03 | End: 2023-11-03

## 2023-11-03 RX ORDER — HYDROCODONE BITARTRATE AND ACETAMINOPHEN 10; 325 MG/1; MG/1
2 TABLET ORAL EVERY 6 HOURS PRN
Status: DISCONTINUED | OUTPATIENT
Start: 2023-11-03 | End: 2023-11-09 | Stop reason: HOSPADM

## 2023-11-03 RX ADMIN — MORPHINE SULFATE 4 MG: 4 INJECTION, SOLUTION INTRAMUSCULAR; INTRAVENOUS at 11:11

## 2023-11-03 RX ADMIN — PIPERACILLIN SODIUM AND TAZOBACTAM SODIUM 3.38 G: 3; .375 INJECTION, POWDER, LYOPHILIZED, FOR SOLUTION INTRAVENOUS at 08:11

## 2023-11-03 RX ADMIN — PIPERACILLIN SODIUM AND TAZOBACTAM SODIUM 3.38 G: 3; .375 INJECTION, POWDER, LYOPHILIZED, FOR SOLUTION INTRAVENOUS at 04:11

## 2023-11-03 RX ADMIN — MORPHINE SULFATE 4 MG: 4 INJECTION, SOLUTION INTRAMUSCULAR; INTRAVENOUS at 08:11

## 2023-11-03 RX ADMIN — SODIUM CHLORIDE: 0.9 INJECTION, SOLUTION INTRAVENOUS at 02:11

## 2023-11-03 RX ADMIN — ATORVASTATIN CALCIUM 10 MG: 10 TABLET, FILM COATED ORAL at 08:11

## 2023-11-03 RX ADMIN — HYDROCODONE BITARTRATE AND ACETAMINOPHEN 2 TABLET: 10; 325 TABLET ORAL at 09:11

## 2023-11-03 NOTE — PT/OT/SLP PROGRESS
Occupational Therapy   Treatment    Name: Jos Espino  MRN: [id number]  Admitting Diagnosis:  Nontraumatic psoas hematoma  2 Days Post-Op    Recommendations:     Discharge Recommendations: High Intensity Therapy  Discharge Equipment Recommendations:  walker, rolling  Barriers to discharge:  Decreased caregiver support    Assessment:     Jos Espino is a 68 y.o. male with a medical diagnosis of Nontraumatic psoas hematoma.  Performance deficits affecting function are weakness, impaired endurance, impaired self care skills, impaired functional mobility, gait instability, impaired balance, pain.     Rehab Prognosis:  Good; patient would benefit from acute skilled OT services to address these deficits and reach maximum level of function.       Plan:     Patient to be seen 6 x/week to address the above listed problems via self-care/home management, therapeutic activities, therapeutic exercises  Plan of Care Expires: 12/02/23  Plan of Care Reviewed with: patient    Subjective     Pain/Comfort:  Pain Rating 1: 0/10  Pain Rating Post-Intervention 1: 0/10    Objective:     Communicated with: nurse prior to session.  Patient found supine with SARAH drain, peripheral IV upon OT entry to room.    General Precautions: Standard, fall    Orthopedic Precautions:N/A  Braces: N/A  Respiratory Status: Room air     Occupational Performance:     Bed Mobility:    Patient completed Supine to Sit with minimum assistance  Patient completed Sit to Supine with stand by assistance; pt educated on cross leg technique using RLE to assist LLE into bed 2/2 LLE weakness     Functional Mobility/Transfers:  Patient completed Sit <> Stand Transfer with contact guard assistance  with  rolling walker ; pt required safety cue not to pull up on RW with both hands   Functional Mobility: ~20ft CGA with RW    Activities of Daily Living:  Pt declined; already completed     Patient left HOB elevated with all lines intact, call button in reach, and bed alarm  on    GOALS:   Multidisciplinary Problems       Occupational Therapy Goals          Problem: Occupational Therapy    Goal Priority Disciplines Outcome Interventions   Occupational Therapy Goal     OT, PT/OT     Description: Goals to be met by: 12/2/23     Patient will increase functional independence with ADLs by performing:    LE Dressing with Modified [place] using AD as needed.  Grooming while standing at sink with Modified [place].  Toileting from toilet with Modified [place] for hygiene and clothing management.   Toilet transfer to toilet with Modified [place].                         Time Tracking:     OT Date of Treatment: 11/03/23  OT Start Time: 1153  OT Stop Time: 1205  OT Total Time (min): 12 min    Billable Minutes:Therapeutic Activity 12    OT/PATSY: OT          11/3/2023

## 2023-11-03 NOTE — PT/OT/SLP PROGRESS
Physical Therapy Treatment    Patient Name:  Jos Espino   MRN:  [id number]    Recommendations:     Discharge Recommendations: High Intensity Therapy  Discharge Equipment Recommendations: walker, rolling  Barriers to discharge:  decrease left LE function, high fall risk, pain, decrease activity tolerance, increase assist with mobility    Assessment:     Jos Espino is a 68 y.o. male admitted with a medical diagnosis of Nontraumatic psoas hematoma.  He presents with the following impairments/functional limitations: weakness, impaired endurance, impaired self care skills, impaired functional mobility, gait instability, impaired balance, decreased lower extremity function, decreased safety awareness, pain, impaired cardiopulmonary response to activity.    Pt found in bed with HOB elevated. Wife at bedside. Pt agreeable to visit. Pt reports improved LLE function greatest with hip flexion. No active knee ext noted. LLE strength improvements allowed for increase ambulation distance with mod VI for RW. Per discussion with patient and wife, they state he will need to go somewhere prior to discharge home to decrease caregiver burden and increase independence with mobility.    Rehab Prognosis: Fair; patient would benefit from acute skilled PT services to address these deficits and reach maximum level of function.    Recent Surgery: Procedure(s) (LRB):  INCISION AND DRAINAGE, HEMATOMA (Left) 2 Days Post-Op    Plan:     During this hospitalization, patient to be seen daily to address the identified rehab impairments via gait training, therapeutic activities, therapeutic exercises, neuromuscular re-education and progress toward the following goals:    Plan of Care Expires:  12/02/23    Subjective     Chief Complaint: decrease left lower extremity function  Patient/Family Comments/goals: progress toward PLOF  Pain/Comfort:  Pain Rating 1: 3/10  Location - Side 1: Left  Location 1: leg  Pain Addressed 1: Reposition, Distraction,  Cessation of Activity  Pain Rating Post-Intervention 1: 6/10      Objective:     Communicated with RN prior to session.  Patient found HOB elevated with peripheral IV, telemetry, SARAH drain upon PT entry to room.     General Precautions: Standard, fall  Orthopedic Precautions: N/A  Braces: N/A  Respiratory Status: Room air     Functional Mobility:  Bed Mobility:     Supine to Sit: minimum assistance and self assist with LLE toward EOB  Transfers:     Sit to Stand:  minimum assistance with rolling walker  Gait: 55 ft with RW and min A with improved hip flexion allowing for improved foot clearance and mod VI for RW management greatest to maintain RW in contact with the ground      AM-PAC 6 CLICK MOBILITY          Treatment & Education:  Pt educated on POC, discharge recommendation, importance of time OOB, seated TE including ankle DF, shelly ext and hip flexion, optimal gait pattern, RW management, need for assist with mobility, use of call bell to seek assistance as needed and fall prevention      Patient left sitting edge of bed with all lines intact, call button in reach, and wife present..    GOALS:   Multidisciplinary Problems       Physical Therapy Goals          Problem: Physical Therapy    Goal Priority Disciplines Outcome Goal Variances Interventions   Physical Therapy Goal     PT, PT/OT Ongoing, Progressing     Description: Goals to be met by: 23     Patient will increase functional independence with mobility by performin. Supine to sit with Supervision  2. Sit to stand transfer with Supervision  3. Bed to chair transfer with Supervision using Rolling Walker  4. Gait  x 150 feet with Supervision using Rolling Walker.                              Time Tracking:     PT Received On: 23  PT Start Time: 857     PT Stop Time: 09  PT Total Time (min): 10 min     Billable Minutes: Gait Training 10    Treatment Type: Treatment  PT/PTA: PT     Number of PTA visits since last PT visit: 0      11/03/2023

## 2023-11-03 NOTE — PROGRESS NOTES
[facility]  Department of Neurology  Progress Note  Date: 2023 1:48 PM          Patient Name: Jos Espino   MRN: [id number]   : 1955    AGE: 68 y.o.    LOS: 2 days Hospital Day: 5  Admit date: 10/30/2023  7:00 PM       HPI per EMR: Jos Espino is a 68 y.o. male with a history of  presents emergency room with left pelvis/groin pain.  The patient states the pain began yesterday.  He does admit to attending exercise classes that includes exercising his legs with weights about 4 to 5 times a week at the [employer] exercise gym.  But he denied actual trauma.  The patient states his left thigh pain became excruciating with minimal range of motion so he came to emergency room for further evaluation        In the emergency room radiographic imaging revealed a Iliopsoas and left pelvic side wall intramuscular hematomas with a small amount of what is likely hemorrhage within the left retroperitoneum     General surgery was consulted and agreed to see the patient in consult.  The patient will be given pain management and monitored closely        Past medical history significant for hyperlipidemia ED and hypertension that is diet-controlled        The patient is not on anticoagulation and denies excessive NSAID use     Neurology consult:  Patient was seen and examined me.  States that he presented to the hospital for pain and weakness left groin/left lower extremity.  He states that it started yesterday and admits doing exercises with his legs.  Denies any recent trauma.  In the ER, he had a CT abdomen pelvis with contrast which revealed iliopsoas and left pelvic side intramuscular hematomas.  He is not been on any blood thinners.  General surgery consulted and recommended no surgical intervention.  He describes of numbness in the anterior thigh and leg on the left side and associated weakness in his left lower extremity proximal greater than distal.  Neurology consulted for numbness and  weakness.    11/01/2023: No acute events overnight. Patient was seen and examined by me this morning. Neuro exam worsened this morning.  He would significant weakness in his left lower extremity especially in hip flexion/extension and knee flexion/extension.    11/2/2023:  Patient was seen and examined by me.  He is status post hematoma evacuation.  He states that his left leg feels slightly better in terms of strength and sensation.    11/3/2023: Patient was seen and examined by me this morning.  No acute overnight events, no new neurological complaints.  Vitals have been stable. RLE strength slightly improved         Vitals:  Patient Vitals for the past 24 hrs:   BP Temp Temp src Pulse Resp SpO2   11/03/23 1100 (!) 144/68 98.8 °F (37.1 °C) -- 95 20 97 %   11/03/23 0957 -- -- -- -- 18 --   11/03/23 0801 -- -- -- -- 18 --   11/03/23 0730 (!) 160/70 99.1 °F (37.3 °C) -- 91 18 96 %   11/03/23 0434 (!) 147/76 98.8 °F (37.1 °C) Oral 91 18 97 %   11/02/23 2340 (!) 173/90 98.5 °F (36.9 °C) Oral 81 18 97 %   11/02/23 1916 -- (!) 101.1 °F (38.4 °C) -- -- 17 --   11/02/23 1907 (!) 156/79 (!) 101.1 °F (38.4 °C) Oral 105 18 96 %   11/02/23 1629 (!) 144/78 99 °F (37.2 °C) Oral 107 18 98 %     PHYSICAL EXAM:     GENERAL APPEARANCE: Well-developed, well-nourished male in no acute distress.  HEENT: Normocephalic and atraumatic. PERRL. Oropharynx unremarkable.  PULM: Comfortable on room air.  CV: RRR.  ABDOMEN: Soft, nontender.  EXTREMITIES: No signs of vascular compromise. Pulses present. No cyanosis, clubbing or edema.  SKIN: Clear; no rashes, lesions or skin breaks in exposed areas.      NEURO:   MENTAL STATUS: Patient awake and oriented to time, place, and person. Affect normal.  CRANIAL NERVES II-XII: Pupils equal, round and reactive to light. Extraocular movements full and intact. No facial asymmetry.  MOTOR: Normal bulk. Tone normal and symmetrical throughout.  No abnormal movements. No tremor.   Strength 5/5 throughout  "except left lower extremity hip flexion and extension 2/5, knee flexion and extension 3+/5, ankle dorsiflexion and plantar flexion 4/5.  REFLEXES: DTRs 1+ throughout and absent on left patellar  SENSATION:  Decreased sensation to light touch on the left anterior thigh and anterior leg.  COORDINATION: Finger-to-nose normal for age and symmetric.  STATION: Romberg deferred.  GAIT: Deferred.    CURRENT SCHEDULED MEDICATIONS:   atorvastatin  10 mg Oral Daily    fentaNYL  1 patch Transdermal Q72H    piperacillin-tazobactam (Zosyn) IV (PEDS and ADULTS) (extended infusion is not appropriate)  3.375 g Intravenous Q8H     CURRENT INFUSIONS:   sodium chloride 0.9% 125 mL/hr at 11/02/23 1645     DATA:  Recent Labs   Lab 10/30/23  1934 10/31/23  0421 11/01/23  0238 11/02/23  0538 11/03/23  0448    137 137 134* 133*   K 3.8 4.2 3.9 4.5 4.3    105 102 98 101   CO2 26 25 30* 30* 31*   BUN 13 11 14 17 20   CREATININE 1.1 1.1 1.0 1.2 1.2   * 153* 120* 138* 116*   CALCIUM 10.0 9.9 9.8 9.5 8.8   AST 22 18 30 82* 93*   ALT 15 15 17 30 39     Recent Labs   Lab 10/31/23  0421 11/01/23  0238 11/01/23  2320 11/02/23  0539 11/02/23  1530 11/02/23  2206 11/03/23  0448   WBC 12.73* 12.90* 15.86* 16.30* 17.81* 15.65* 16.57*   HGB 12.3* 11.6* 10.2* 9.8* 8.5* 7.8* 8.2*   HCT 35.4* 34.5* 30.2* 29.2* 25.0* 22.8* 23.8*    206 180 177 151 145* 146*     No results found for: "PROTEINCSF", "GLUCCSF", "WBCCSF", "RBCCSF", "PMNCSF"  No results found for: "HGBA1C"         I have personally reviewed and interpreted the pertinent imaging and lab results.  Imaging Results              CT Abdomen Pelvis With IV Contrast (Final result)  Result time 10/30/23 20:42:37      Final result by Luke Britt MD (10/30/23 20:42:37)                   Narrative:    CT ABDOMEN PELVIS WITH IV CONTRAST    COMPARISONS:  none.    ADDITIONAL PERTINENT HISTORY:   Flank pain with possible kidney stone    TECHNIQUE:   Multiple axial images were " obtained from the lung bases through the lesser trochanters after the adminstration of intravenous contrast.  One of the following dose optimization techniques was utilized in the performance of this exam: Automated exposure control; adjustment of the mA and/or kV according to the patient's size; or use of an iterative  reconstruction technique.  Specific details can be referenced in the facility's radiology CT exam operational policy.    CONTRAST:   100 mL of IV Omnipaque 350.    FINDINGS:  Lung bases: negative    Free air/free fluid: Small amount of free fluid within the inferior aspects of the left retroperitoneum and extending along the left pelvic sidewall.  Liver:  negative  Spleen: negative  Adrenal glands: negative  Kidneys /ureters/urinary bladder: negative  Pancreas: negative  Gall Bladder:   negative    Bowel / mesentery: Negative including a normal-appearing appendix in the right lower quadrant.    Lymph node assessment: negative    Pelvic contents: negative  Abdominal vasculature: Atherosclerotic disease of the abdominal aorta and its major branches.    Surrounding soft tissues: Enlargement of the left iliopsoas musculature is well as the left pelvic sidewall musculature compatible with intramuscular hematomas.  Osseous structures: negative      IMPRESSION:    1. Iliopsoas and left pelvic side wall intramuscular hematomas with a small amount of what is likely hemorrhage within the left retroperitoneum. No active extravasation of contrast noted at the time of the CT scan..  2. No other acute intra-abdominal or intrapelvic process.    Electronically signed by:  Luke Britt MD  10/30/2023 08:42 PM CDT Workstation: [id number]                                            ASSESSMENT AND PLAN:     Entrapment neuropathy  Intramuscular and retroperitoneal hematoma        Plan:   Patient presented with numbness/pain and weakness in his left lower extremity.  On exam, he has numbness in the anterior femoral  cutaneous nerve and saphenous nerve distribution and weakness in hip flexion/extension, knee flexion and extension.  Symptoms likely secondary to entrapment neuropathy in the setting of hematoma  Status post hematoma evacuation on 11/01.  Somewhat improvement noted in weakness and sensation.  General surgery following  Avoid any blood thinners and antiplatelet at this time.  PT OT evaluate and treat  DVT prophylaxis with SCds  Will follow as needed. Please call with any questions. If weakness worsens/does not improve, will recommend MRI lumbar plexus to evaluate.              Johnathon Reddy MD  Neurology/vascular Neurology  Date of Service: 11/03/2023  1:48 PM    Please note: This note was transcribed using voice recognition software. Because of this technology there are often uinintended grammatical, spelling, and other transcription errors. Please disregard these errors.

## 2023-11-03 NOTE — PROGRESS NOTES
[facility]  General Surgery  Progress Note    Subjective:     History of Present Illness:  67 y/o male presents with left-sided abdominal pain radiating down to his leg.  He does note some left thigh and knee numbness as well.  He notes this has been present starting over about 24-48 hrs.  He denies any traumatic events and does not take any blood thinners.  He does report he has been exercising more and specifically doing leg presses.      Post-Op Info:  Procedure(s) (LRB):  INCISION AND DRAINAGE, HEMATOMA (Left)   2 Days Post-Op     Interval History: stable, pain hard to control  Still struggling to move leg    Medications:  Continuous Infusions:   sodium chloride 0.9% 125 mL/hr at 11/02/23 1645     Scheduled Meds:   atorvastatin  10 mg Oral Daily    fentaNYL  1 patch Transdermal Q72H    piperacillin-tazobactam (Zosyn) IV (PEDS and ADULTS) (extended infusion is not appropriate)  3.375 g Intravenous Q8H     PRN Meds:acetaminophen, HYDROcodone-acetaminophen, melatonin, morphine, ondansetron, sodium chloride 0.9%     Review of patient's allergies indicates:  No Known Allergies  Objective:     Vital Signs (Most Recent):  Temp: 99.1 °F (37.3 °C) (11/03/23 0730)  Pulse: 91 (11/03/23 0730)  Resp: 18 (11/03/23 0801)  BP: (!) 160/70 (11/03/23 0730)  SpO2: 96 % (11/03/23 0730) Vital Signs (24h Range):  Temp:  [98.5 °F (36.9 °C)-101.1 °F (38.4 °C)] 99.1 °F (37.3 °C)  Pulse:  [] 91  Resp:  [17-18] 18  SpO2:  [96 %-98 %] 96 %  BP: (143-173)/(70-90) 160/70     Weight: 81.6 kg (180 lb)  Body mass index is 25.83 kg/m².    Intake/Output - Last 3 Shifts         11/01 0700 11/02 0659 11/02 0700 11/03 0659 11/03 0700 11/04 0659    P.O. 940 1080     I.V. (mL/kg)  750 (9.2)     IV Piggyback 1300 100     Total Intake(mL/kg) 2240 (27.5) 1930 (23.7)     Urine (mL/kg/hr) 950 (0.5) 2500 (1.3)     Drains 80 30     Total Output 1030 2530     Net +1210 -600                     Physical Exam  Abdominal:      General:  There is no distension.      Palpations: Abdomen is soft.      Tenderness: There is no abdominal tenderness.      Comments: Collins joiner          Significant Labs:  I have reviewed all pertinent lab results within the past 24 hours.  CBC:   Recent Labs   Lab 11/03/23  0448   WBC 16.57*   RBC 2.93*   HGB 8.2*   HCT 23.8*   *   MCV 81*   MCH 28.0   MCHC 34.5     BMP:   Recent Labs   Lab 11/03/23 0448   *   *   K 4.3      CO2 31*   BUN 20   CREATININE 1.2   CALCIUM 8.8       Significant Diagnostics:  I have reviewed all pertinent imaging results/findings within the past 24 hours.    Assessment/Plan:     * Nontraumatic psoas hematoma  Neuropathy seems better  Hematoma recurred   Fever leukocytosis from hematoma, - does not seem to be infected  Physical therapy  Continue observation  Continue drain for now        Melba Farias MD  General Surgery  [facility]

## 2023-11-03 NOTE — SUBJECTIVE & OBJECTIVE
Interval History: stable, pain hard to control  Still struggling to move leg    Medications:  Continuous Infusions:   sodium chloride 0.9% 125 mL/hr at 11/02/23 1645     Scheduled Meds:   atorvastatin  10 mg Oral Daily    fentaNYL  1 patch Transdermal Q72H    piperacillin-tazobactam (Zosyn) IV (PEDS and ADULTS) (extended infusion is not appropriate)  3.375 g Intravenous Q8H     PRN Meds:acetaminophen, HYDROcodone-acetaminophen, melatonin, morphine, ondansetron, sodium chloride 0.9%     Review of patient's allergies indicates:  No Known Allergies  Objective:     Vital Signs (Most Recent):  Temp: 99.1 °F (37.3 °C) (11/03/23 0730)  Pulse: 91 (11/03/23 0730)  Resp: 18 (11/03/23 0801)  BP: (!) 160/70 (11/03/23 0730)  SpO2: 96 % (11/03/23 0730) Vital Signs (24h Range):  Temp:  [98.5 °F (36.9 °C)-101.1 °F (38.4 °C)] 99.1 °F (37.3 °C)  Pulse:  [] 91  Resp:  [17-18] 18  SpO2:  [96 %-98 %] 96 %  BP: (143-173)/(70-90) 160/70     Weight: 81.6 kg (180 lb)  Body mass index is 25.83 kg/m².    Intake/Output - Last 3 Shifts         11/01 0700 11/02 0659 11/02 0700 11/03 0659 11/03 0700 11/04 0659    P.O. 940 1080     I.V. (mL/kg)  750 (9.2)     IV Piggyback 1300 100     Total Intake(mL/kg) 2240 (27.5) 1930 (23.7)     Urine (mL/kg/hr) 950 (0.5) 2500 (1.3)     Drains 80 30     Total Output 1030 2530     Net +1210 -600                     Physical Exam  Abdominal:      General: There is no distension.      Palpations: Abdomen is soft.      Tenderness: There is no abdominal tenderness.      Comments: Collins ss          Significant Labs:  I have reviewed all pertinent lab results within the past 24 hours.  CBC:   Recent Labs   Lab 11/03/23  0448   WBC 16.57*   RBC 2.93*   HGB 8.2*   HCT 23.8*   *   MCV 81*   MCH 28.0   MCHC 34.5     BMP:   Recent Labs   Lab 11/03/23 0448   *   *   K 4.3      CO2 31*   BUN 20   CREATININE 1.2   CALCIUM 8.8       Significant Diagnostics:  I have reviewed all pertinent  imaging results/findings within the past 24 hours.

## 2023-11-03 NOTE — PROGRESS NOTES
[facility] Medicine  Progress Note    Patient Name: Jos Espino  MRN: [id number]  Patient Class: IP- Inpatient   Admission Date: 10/30/2023  Length of Stay: 2 days  Attending Physician: Marily Crawley MD  Primary Care Provider: Abiel Salguero MD        Subjective:     Principal Problem:Nontraumatic psoas hematoma        HPI:  68 year old  male presents emergency room with left pelvis/groin pain.  The patient states the pain began yesterday.  He does admit to attending exercise classes that includes exercising his legs with weights about 4 to 5 times a week at the [employer] exercise gym.  But he denied actual trauma.  The patient states his left thigh pain became excruciating with minimal range of motion so he came to emergency room for further evaluation. In the emergency room radiographic imaging revealed a Iliopsoas and left pelvic side wall intramuscular hematomas with a small amount of what is likely hemorrhage within the left retroperitoneum. General surgery was consulted and agreed to see the patient in consult.  The patient will be given pain management and monitored closely. Past medical history significant for hyperlipidemia ED and hypertension that is diet-controlled. The patient is not on anticoagulation and denies excessive NSAID use      Overview/Hospital Course:  68M with PMH HTN, HLD, and BPH s/p TURP is admitted with nontraumatic hematoma to left iliopsoas and pelvic wall. Hemoglobin monitored. Gen surg consulted. Neurology consulted as well for numbness to LLE. Taken to OR for hematoma evacuation with general surgery to inability to extend left knee. Drain in place.      Interval History:  His strength in his left leg is not back to normal but is improved from yesterday.  No pain at his drain site.  Swelling seems a little improved.  Had a fever last night which resolved with Tylenol    Review of Systems Complete ROS otherwise negative other than stated in  HPI.   Objective:     Vital Signs (Most Recent):  Temp: 98.8 °F (37.1 °C) (11/03/23 1100)  Pulse: 95 (11/03/23 1100)  Resp: 20 (11/03/23 1100)  BP: (!) 144/68 (11/03/23 1100)  SpO2: 97 % (11/03/23 1100) Vital Signs (24h Range):  Temp:  [98.5 °F (36.9 °C)-101.1 °F (38.4 °C)] 98.8 °F (37.1 °C)  Pulse:  [] 95  Resp:  [17-20] 20  SpO2:  [96 %-98 %] 97 %  BP: (144-173)/(68-90) 144/68     Weight: 81.6 kg (180 lb)  Body mass index is 25.83 kg/m².    Intake/Output Summary (Last 24 hours) at 11/3/2023 1317  Last data filed at 11/3/2023 1201  Gross per 24 hour   Intake 1675 ml   Output 1820 ml   Net -145 ml         Physical Exam  GENERAL:  Alert and oriented x 3  HEENT:  EOMI. Conjunctivae intact. Posterior pharynx clear, oral mucosa moist  NECK:  Supple   LUNGS:  No respiratory distress. Clear to auscultation bilaterally with good air movement  CARDIAC:  RRR without murmur, rub or gallop  ABDOMEN:  Soft,  Nontender and nondistended, no rebound or guarding, bowel sounds present   EXTREMITIES:  Peripheral pulses are 2+. Hands and feet are warm. Good capillary refill in fingers (< 2 seconds). No clubbing, cyanosis or edema.  Left groin hematoma with  drain in place with bloody drainage, surrounding swelling improved, no skin changes        Significant Labs: All pertinent labs within the past 24 hours have been reviewed.  BMP:   Recent Labs   Lab 11/03/23  0448   *   *   K 4.3      CO2 31*   BUN 20   CREATININE 1.2   CALCIUM 8.8     CBC:   Recent Labs   Lab 11/02/23  1530 11/02/23  2206 11/03/23  0448   WBC 17.81* 15.65* 16.57*   HGB 8.5* 7.8* 8.2*   HCT 25.0* 22.8* 23.8*    145* 146*     CMP:   Recent Labs   Lab 11/02/23  0538 11/03/23  0448   * 133*   K 4.5 4.3   CL 98 101   CO2 30* 31*   * 116*   BUN 17 20   CREATININE 1.2 1.2   CALCIUM 9.5 8.8   PROT 7.0 6.2   ALBUMIN 3.7 3.3*   BILITOT 1.2* 1.2*   ALKPHOS 46* 42*   AST 82* 93*   ALT 30 39   ANIONGAP 6* 1*       Significant  Imaging: I have reviewed all pertinent imaging results/findings within the past 24 hours.      Assessment/Plan:      * Nontraumatic psoas hematoma    Hematoma recurred after drain placed.  Strength improving.  Continue PT.  Surgery recommendations reviewed, no further intervention planned.  Continue drain and observation.  Continue to monitor fever and continue empiric Zosyn.  Sent blood cultures.  Monitor clinically          Hypercholesteremia  continue statin      VTE Risk Mitigation (From admission, onward)         Ordered     IP VTE LOW RISK PATIENT  Once         10/31/23 0209                Discharge Planning   MAGDALENA: 11/5/2023     Code Status: Full Code   Is the patient medically ready for discharge?:     Reason for patient still in hospital (select all that apply): Patient trending condition, Treatment and Consult recommendations  Discharge Plan A: Home with family                  Marily Crawley MD  Department of Hospital Medicine   [facility]

## 2023-11-03 NOTE — SUBJECTIVE & OBJECTIVE
Interval History:  His strength in his left leg is not back to normal but is improved from yesterday.  No pain at his drain site.  Swelling seems a little improved.  Had a fever last night which resolved with Tylenol    Review of Systems Complete ROS otherwise negative other than stated in HPI.   Objective:     Vital Signs (Most Recent):  Temp: 98.8 °F (37.1 °C) (11/03/23 1100)  Pulse: 95 (11/03/23 1100)  Resp: 20 (11/03/23 1100)  BP: (!) 144/68 (11/03/23 1100)  SpO2: 97 % (11/03/23 1100) Vital Signs (24h Range):  Temp:  [98.5 °F (36.9 °C)-101.1 °F (38.4 °C)] 98.8 °F (37.1 °C)  Pulse:  [] 95  Resp:  [17-20] 20  SpO2:  [96 %-98 %] 97 %  BP: (144-173)/(68-90) 144/68     Weight: 81.6 kg (180 lb)  Body mass index is 25.83 kg/m².    Intake/Output Summary (Last 24 hours) at 11/3/2023 1317  Last data filed at 11/3/2023 1201  Gross per 24 hour   Intake 1675 ml   Output 1820 ml   Net -145 ml         Physical Exam  GENERAL:  Alert and oriented x 3  HEENT:  EOMI. Conjunctivae intact. Posterior pharynx clear, oral mucosa moist  NECK:  Supple   LUNGS:  No respiratory distress. Clear to auscultation bilaterally with good air movement  CARDIAC:  RRR without murmur, rub or gallop  ABDOMEN:  Soft,  Nontender and nondistended, no rebound or guarding, bowel sounds present   EXTREMITIES:  Peripheral pulses are 2+. Hands and feet are warm. Good capillary refill in fingers (< 2 seconds). No clubbing, cyanosis or edema.  Left groin hematoma with  drain in place with bloody drainage, surrounding swelling improved, no skin changes        Significant Labs: All pertinent labs within the past 24 hours have been reviewed.  BMP:   Recent Labs   Lab 11/03/23  0448   *   *   K 4.3      CO2 31*   BUN 20   CREATININE 1.2   CALCIUM 8.8     CBC:   Recent Labs   Lab 11/02/23  1530 11/02/23  2206 11/03/23  0448   WBC 17.81* 15.65* 16.57*   HGB 8.5* 7.8* 8.2*   HCT 25.0* 22.8* 23.8*    145* 146*     CMP:   Recent Labs    Lab 11/02/23  0538 11/03/23  0448   * 133*   K 4.5 4.3   CL 98 101   CO2 30* 31*   * 116*   BUN 17 20   CREATININE 1.2 1.2   CALCIUM 9.5 8.8   PROT 7.0 6.2   ALBUMIN 3.7 3.3*   BILITOT 1.2* 1.2*   ALKPHOS 46* 42*   AST 82* 93*   ALT 30 39   ANIONGAP 6* 1*       Significant Imaging: I have reviewed all pertinent imaging results/findings within the past 24 hours.

## 2023-11-03 NOTE — ASSESSMENT & PLAN NOTE
Neuropathy seems better  Hematoma recurred   Fever leukocytosis from hematoma, - does not seem to be infected  Physical therapy  Continue observation  Continue drain for now

## 2023-11-04 LAB
ALBUMIN SERPL BCP-MCNC: 3.1 G/DL (ref 3.5–5.2)
ALP SERPL-CCNC: 51 U/L (ref 55–135)
ALT SERPL W/O P-5'-P-CCNC: 47 U/L (ref 10–44)
ANION GAP SERPL CALC-SCNC: 4 MMOL/L (ref 8–16)
AST SERPL-CCNC: 84 U/L (ref 10–40)
BASOPHILS # BLD AUTO: 0.04 K/UL (ref 0–0.2)
BASOPHILS NFR BLD: 0.3 % (ref 0–1.9)
BILIRUB SERPL-MCNC: 0.9 MG/DL (ref 0.1–1)
BLD PROD TYP BPU: NORMAL
BLOOD UNIT EXPIRATION DATE: NORMAL
BLOOD UNIT TYPE CODE: 6200
BLOOD UNIT TYPE: NORMAL
BUN SERPL-MCNC: 15 MG/DL (ref 8–23)
CALCIUM SERPL-MCNC: 8.6 MG/DL (ref 8.7–10.5)
CHLORIDE SERPL-SCNC: 101 MMOL/L (ref 95–110)
CO2 SERPL-SCNC: 29 MMOL/L (ref 23–29)
CODING SYSTEM: NORMAL
CREAT SERPL-MCNC: 1.1 MG/DL (ref 0.5–1.4)
CROSSMATCH INTERPRETATION: NORMAL
DIFFERENTIAL METHOD: ABNORMAL
DISPENSE STATUS: NORMAL
EOSINOPHIL # BLD AUTO: 0.4 K/UL (ref 0–0.5)
EOSINOPHIL NFR BLD: 2.9 % (ref 0–8)
ERYTHROCYTE [DISTWIDTH] IN BLOOD BY AUTOMATED COUNT: 12.7 % (ref 11.5–14.5)
EST. GFR  (NO RACE VARIABLE): >60 ML/MIN/1.73 M^2
GLUCOSE SERPL-MCNC: 111 MG/DL (ref 70–110)
HCT VFR BLD AUTO: 21.8 % (ref 40–54)
HGB BLD-MCNC: 7.3 G/DL (ref 14–18)
IMM GRANULOCYTES # BLD AUTO: 0.06 K/UL (ref 0–0.04)
IMM GRANULOCYTES NFR BLD AUTO: 0.5 % (ref 0–0.5)
LYMPHOCYTES # BLD AUTO: 1.5 K/UL (ref 1–4.8)
LYMPHOCYTES NFR BLD: 11.5 % (ref 18–48)
MCH RBC QN AUTO: 27 PG (ref 27–31)
MCHC RBC AUTO-ENTMCNC: 33.5 G/DL (ref 32–36)
MCV RBC AUTO: 81 FL (ref 82–98)
MONOCYTES # BLD AUTO: 1.6 K/UL (ref 0.3–1)
MONOCYTES NFR BLD: 12.2 % (ref 4–15)
NEUTROPHILS # BLD AUTO: 9.4 K/UL (ref 1.8–7.7)
NEUTROPHILS NFR BLD: 72.6 % (ref 38–73)
NRBC BLD-RTO: 0 /100 WBC
NUM UNITS TRANS PACKED RBC: NORMAL
PLATELET # BLD AUTO: 178 K/UL (ref 150–450)
PMV BLD AUTO: 12.7 FL (ref 9.2–12.9)
POTASSIUM SERPL-SCNC: 3.7 MMOL/L (ref 3.5–5.1)
PROT SERPL-MCNC: 6.2 G/DL (ref 6–8.4)
RBC # BLD AUTO: 2.7 M/UL (ref 4.6–6.2)
SODIUM SERPL-SCNC: 134 MMOL/L (ref 136–145)
WBC # BLD AUTO: 12.9 K/UL (ref 3.9–12.7)

## 2023-11-04 PROCEDURE — 63600175 PHARM REV CODE 636 W HCPCS: Performed by: SURGERY

## 2023-11-04 PROCEDURE — 12000002 HC ACUTE/MED SURGE SEMI-PRIVATE ROOM

## 2023-11-04 PROCEDURE — 97116 GAIT TRAINING THERAPY: CPT

## 2023-11-04 PROCEDURE — 97535 SELF CARE MNGMENT TRAINING: CPT

## 2023-11-04 PROCEDURE — 85025 COMPLETE CBC W/AUTO DIFF WBC: CPT | Performed by: NURSE PRACTITIONER

## 2023-11-04 PROCEDURE — P9016 RBC LEUKOCYTES REDUCED: HCPCS | Performed by: HOSPITALIST

## 2023-11-04 PROCEDURE — 80053 COMPREHEN METABOLIC PANEL: CPT | Performed by: NURSE PRACTITIONER

## 2023-11-04 PROCEDURE — 86920 COMPATIBILITY TEST SPIN: CPT | Performed by: HOSPITALIST

## 2023-11-04 PROCEDURE — 36415 COLL VENOUS BLD VENIPUNCTURE: CPT | Performed by: NURSE PRACTITIONER

## 2023-11-04 PROCEDURE — 25000003 PHARM REV CODE 250: Performed by: SURGERY

## 2023-11-04 PROCEDURE — 25000003 PHARM REV CODE 250: Performed by: NURSE PRACTITIONER

## 2023-11-04 RX ORDER — HYDROCODONE BITARTRATE AND ACETAMINOPHEN 500; 5 MG/1; MG/1
TABLET ORAL
Status: DISCONTINUED | OUTPATIENT
Start: 2023-11-04 | End: 2023-11-09 | Stop reason: HOSPADM

## 2023-11-04 RX ADMIN — HYDROCODONE BITARTRATE AND ACETAMINOPHEN 2 TABLET: 10; 325 TABLET ORAL at 07:11

## 2023-11-04 RX ADMIN — PIPERACILLIN SODIUM AND TAZOBACTAM SODIUM 3.38 G: 3; .375 INJECTION, POWDER, LYOPHILIZED, FOR SOLUTION INTRAVENOUS at 12:11

## 2023-11-04 RX ADMIN — ATORVASTATIN CALCIUM 10 MG: 10 TABLET, FILM COATED ORAL at 08:11

## 2023-11-04 RX ADMIN — FENTANYL 1 PATCH: 25 PATCH TRANSDERMAL at 12:11

## 2023-11-04 RX ADMIN — PIPERACILLIN SODIUM AND TAZOBACTAM SODIUM 3.38 G: 3; .375 INJECTION, POWDER, LYOPHILIZED, FOR SOLUTION INTRAVENOUS at 05:11

## 2023-11-04 RX ADMIN — PIPERACILLIN SODIUM AND TAZOBACTAM SODIUM 3.38 G: 3; .375 INJECTION, POWDER, LYOPHILIZED, FOR SOLUTION INTRAVENOUS at 08:11

## 2023-11-04 NOTE — PT/OT/SLP PROGRESS
Physical Therapy Treatment    Patient Name:  Jos Espino   MRN:  [id number]    Recommendations:     Discharge Recommendations: High Intensity Therapy  Discharge Equipment Recommendations: walker, rolling  Barriers to discharge:  decrease left LE function, high fall risk, pain, decrease activity tolerance, increase assist with mobility    Assessment:     Jos Espino is a 68 y.o. male admitted with a medical diagnosis of Nontraumatic psoas hematoma.  He presents with the following impairments/functional limitations: weakness, impaired endurance, impaired functional mobility, impaired self care skills, gait instability, impaired balance, decreased lower extremity function, decreased safety awareness, pain, impaired cardiopulmonary response to activity.    Pt found in bed with HOB elevated. Pt agreeable to visit. Pt reports improved LLE function greatest with knee flexion. No active knee ext noted. LLE strength improvements allowed for increase ambulation distance with mod VI for RW and LLE foot placement.     Rehab Prognosis: Fair; patient would benefit from acute skilled PT services to address these deficits and reach maximum level of function.    Recent Surgery: Procedure(s) (LRB):  INCISION AND DRAINAGE, HEMATOMA (Left) 3 Days Post-Op    Plan:     During this hospitalization, patient to be seen daily to address the identified rehab impairments via gait training, therapeutic activities, therapeutic exercises, neuromuscular re-education and progress toward the following goals:    Plan of Care Expires:  12/04/23    Subjective     Chief Complaint: LLE deficits, poor knee ext  Patient/Family Comments/goals: go to rehab  Pain/Comfort:  Pain Rating 1: 1/10  Location - Side 1: Left  Location 1: leg  Pain Addressed 1: Reposition, Distraction, Cessation of Activity      Objective:     Communicated with RN prior to session.  Patient found HOB elevated with peripheral IV, telemetry, SARAH drain upon PT entry to room.     General  Precautions: Standard, fall  Orthopedic Precautions: N/A  Braces: N/A  Respiratory Status: Room air     Functional Mobility:  Bed Mobility:     Supine to Sit: supervision utilize R hooking technique  Transfers:     Sit to Stand:  contact guard assistance with rolling walker  Gait: 70 ft x 2 with RW and min A, mod VI for RW management and left foot placement, improved WS and weight bearing tolerance due to improved pain management      AM-PAC 6 CLICK MOBILITY          Treatment & Education:  Pt educated on POC, discharge recommendation, need for assist with mobility, importance of time OOB, optimal gait pattern, RW management, quad activation, use of call bell to seek assistance as needed and fall prevention      Patient left up in chair with all lines intact, call button in reach, and RN notified..    GOALS:   Multidisciplinary Problems       Physical Therapy Goals          Problem: Physical Therapy    Goal Priority Disciplines Outcome Goal Variances Interventions   Physical Therapy Goal     PT, PT/OT Ongoing, Progressing     Description: Goals to be met by: 23     Patient will increase functional independence with mobility by performin. Supine to sit with Supervision  2. Sit to stand transfer with Supervision  3. Bed to chair transfer with Supervision using Rolling Walker  4. Gait  x 150 feet with Supervision using Rolling Walker.                              Time Tracking:     PT Received On: 23  PT Start Time: 923     PT Stop Time: 933  PT Total Time (min): 10 min     Billable Minutes: Gait Training 10    Treatment Type: Treatment  PT/PTA: PT     Number of PTA visits since last PT visit: 0     2023

## 2023-11-04 NOTE — PROGRESS NOTES
Patient seen and examined.  Feels well.  Pain is controlled.    Vitals are stable   Afebrile   Left groin leg are soft.  Mild induration.  Drain is serosanguineous.  Incisions clean and dry    No significant changes from surgical perspective.  Continue to monitor hemoglobin.

## 2023-11-04 NOTE — SUBJECTIVE & OBJECTIVE
Interval History:  He has increased swelling of the left thigh with some tenderness.  Denies chest pain, dizziness, shortness for breath.  Fever resolved.  I discussed case with General surgery    Review of Systems Complete ROS otherwise negative other than stated in HPI.   Objective:     Vital Signs (Most Recent):  Temp: 98.8 °F (37.1 °C) (11/04/23 0711)  Pulse: 79 (11/04/23 0711)  Resp: 18 (11/04/23 0711)  BP: (!) 170/77 (Dr. Crawley notified) (11/04/23 0711)  SpO2: 98 % (11/04/23 0711) Vital Signs (24h Range):  Temp:  [98.8 °F (37.1 °C)-99.4 °F (37.4 °C)] 98.8 °F (37.1 °C)  Pulse:  [79-88] 79  Resp:  [18] 18  SpO2:  [95 %-99 %] 98 %  BP: (143-176)/(67-77) 170/77     Weight: 81.6 kg (180 lb)  Body mass index is 25.83 kg/m².    Intake/Output Summary (Last 24 hours) at 11/4/2023 1109  Last data filed at 11/4/2023 0937  Gross per 24 hour   Intake 2449.58 ml   Output 2070 ml   Net 379.58 ml         Physical Exam  GENERAL:  Alert and oriented x 3  HEENT:  EOMI. Conjunctivae intact. Posterior pharynx clear, oral mucosa moist  NECK:  Supple   LUNGS:  No respiratory distress. Clear to auscultation bilaterally with good air movement  CARDIAC:  RRR without murmur, rub or gallop  ABDOMEN:  Soft,  Nontender and nondistended, no rebound or guarding, bowel sounds present   EXTREMITIES:  Peripheral pulses are 2+. Hands and feet are warm. Good capillary refill in fingers (< 2 seconds). No clubbing, cyanosis or edema.  Left groin hematoma with  drain in place with bloody drainage, surrounding swelling improved, left thigh swelling increased on medial upper thigh          Significant Labs: All pertinent labs within the past 24 hours have been reviewed.  BMP:   Recent Labs   Lab 11/04/23  0451   *   *   K 3.7      CO2 29   BUN 15   CREATININE 1.1   CALCIUM 8.6*     CBC:   Recent Labs   Lab 11/02/23  2206 11/03/23  0448 11/04/23  0451   WBC 15.65* 16.57* 12.90*   HGB 7.8* 8.2* 7.3*   HCT 22.8* 23.8* 21.8*   *  146* 178     CMP:   Recent Labs   Lab 11/03/23  0448 11/04/23  0451   * 134*   K 4.3 3.7    101   CO2 31* 29   * 111*   BUN 20 15   CREATININE 1.2 1.1   CALCIUM 8.8 8.6*   PROT 6.2 6.2   ALBUMIN 3.3* 3.1*   BILITOT 1.2* 0.9   ALKPHOS 42* 51*   AST 93* 84*   ALT 39 47*   ANIONGAP 1* 4*       Significant Imaging: I have reviewed all pertinent imaging results/findings within the past 24 hours.

## 2023-11-04 NOTE — PT/OT/SLP PROGRESS
Occupational Therapy   Treatment    Name: Jos Espino  MRN: [id number]  Admitting Diagnosis:  Nontraumatic psoas hematoma  3 Days Post-Op    Recommendations:     Discharge Recommendations: High Intensity Therapy  Discharge Equipment Recommendations:  walker, rolling  Barriers to discharge:  Decreased caregiver support    Assessment:     Jos Espino is a 68 y.o. male with a medical diagnosis of Nontraumatic psoas hematoma. Performance deficits affecting function are weakness, impaired endurance, impaired self care skills, impaired functional mobility, gait instability, impaired balance, decreased coordination, decreased ROM, impaired cardiopulmonary response to activity, decreased safety awareness.     Rehab Prognosis:  Good; patient would benefit from acute skilled OT services to address these deficits and reach maximum level of function.       Plan:     Patient to be seen 6 x/week to address the above listed problems via self-care/home management, therapeutic activities, therapeutic exercises  Plan of Care Expires: 12/02/23  Plan of Care Reviewed with: patient    Subjective     Chief Complaint: none  Patient/Family Comments/goals: none  Pain/Comfort:  Pain Rating 1: 0/10  Pain Rating Post-Intervention 1: 0/10    Objective:     Communicated with: nurse prior to session.  Patient found up in chair with SARAH drain, peripheral IV, telemetry upon OT entry to room.    General Precautions: Standard, fall    Orthopedic Precautions:N/A  Braces: N/A  Respiratory Status: Room air     Occupational Performance:     Functional Mobility/Transfers:  Patient completed Sit <> Stand Transfer with contact guard assistance  with  rolling walker     Activities of Daily Living:  Grooming: stand by assistance with oral and facial hygiene while standing at sink  Lower Body Dressing: stand by assistance to don/doff socks while seated in chair      [facility] 6 Click ADL:      Treatment & Education:  OT ed patient on safety with walker use for  functional mobility with cues for hand placement & sequencing.       Patient left up in chair with all lines intact and call button in reach    GOALS:   Multidisciplinary Problems       Occupational Therapy Goals          Problem: Occupational Therapy    Goal Priority Disciplines Outcome Interventions   Occupational Therapy Goal     OT, PT/OT     Description: Goals to be met by: 12/2/23     Patient will increase functional independence with ADLs by performing:    LE Dressing with Modified [place] using AD as needed.  Grooming while standing at sink with Modified [place].  Toileting from toilet with Modified [place] for hygiene and clothing management.   Toilet transfer to toilet with Modified [place].                         Time Tracking:     OT Date of Treatment: 11/04/23  OT Start Time: 1018  OT Stop Time: 1033  OT Total Time (min): 15 min    Billable Minutes:Self Care/Home Management 15    OT/PATSY: OT          11/4/2023

## 2023-11-04 NOTE — PROGRESS NOTES
[facility] Medicine  Progress Note    Patient Name: Jos Espino  MRN: [id number]  Patient Class: IP- Inpatient   Admission Date: 10/30/2023  Length of Stay: 3 days  Attending Physician: Marily Crawley MD  Primary Care Provider: Abiel Salguero MD        Subjective:     Principal Problem:Nontraumatic psoas hematoma        HPI:  68 year old  male presents emergency room with left pelvis/groin pain.  The patient states the pain began yesterday.  He does admit to attending exercise classes that includes exercising his legs with weights about 4 to 5 times a week at the [employer] exercise gym.  But he denied actual trauma.  The patient states his left thigh pain became excruciating with minimal range of motion so he came to emergency room for further evaluation. In the emergency room radiographic imaging revealed a Iliopsoas and left pelvic side wall intramuscular hematomas with a small amount of what is likely hemorrhage within the left retroperitoneum. General surgery was consulted and agreed to see the patient in consult.  The patient will be given pain management and monitored closely. Past medical history significant for hyperlipidemia ED and hypertension that is diet-controlled. The patient is not on anticoagulation and denies excessive NSAID use      Overview/Hospital Course:  68M with PMH HTN, HLD, and BPH s/p TURP is admitted with nontraumatic hematoma to left iliopsoas and pelvic wall. Hemoglobin monitored. Gen surg consulted. Neurology consulted as well for numbness to LLE. Taken to OR for hematoma evacuation with general surgery to inability to extend left knee. Drain in place.      Interval History:  He has increased swelling of the left thigh with some tenderness.  Denies chest pain, dizziness, shortness for breath.  Fever resolved.  I discussed case with General surgery    Review of Systems Complete ROS otherwise negative other than stated in HPI.   Objective:      Vital Signs (Most Recent):  Temp: 98.8 °F (37.1 °C) (11/04/23 0711)  Pulse: 79 (11/04/23 0711)  Resp: 18 (11/04/23 0711)  BP: (!) 170/77 (Dr. Crawley notified) (11/04/23 0711)  SpO2: 98 % (11/04/23 0711) Vital Signs (24h Range):  Temp:  [98.8 °F (37.1 °C)-99.4 °F (37.4 °C)] 98.8 °F (37.1 °C)  Pulse:  [79-88] 79  Resp:  [18] 18  SpO2:  [95 %-99 %] 98 %  BP: (143-176)/(67-77) 170/77     Weight: 81.6 kg (180 lb)  Body mass index is 25.83 kg/m².    Intake/Output Summary (Last 24 hours) at 11/4/2023 1109  Last data filed at 11/4/2023 0937  Gross per 24 hour   Intake 2449.58 ml   Output 2070 ml   Net 379.58 ml         Physical Exam  GENERAL:  Alert and oriented x 3  HEENT:  EOMI. Conjunctivae intact. Posterior pharynx clear, oral mucosa moist  NECK:  Supple   LUNGS:  No respiratory distress. Clear to auscultation bilaterally with good air movement  CARDIAC:  RRR without murmur, rub or gallop  ABDOMEN:  Soft,  Nontender and nondistended, no rebound or guarding, bowel sounds present   EXTREMITIES:  Peripheral pulses are 2+. Hands and feet are warm. Good capillary refill in fingers (< 2 seconds). No clubbing, cyanosis or edema.  Left groin hematoma with  drain in place with bloody drainage, surrounding swelling improved, left thigh swelling increased on medial upper thigh          Significant Labs: All pertinent labs within the past 24 hours have been reviewed.  BMP:   Recent Labs   Lab 11/04/23  0451   *   *   K 3.7      CO2 29   BUN 15   CREATININE 1.1   CALCIUM 8.6*     CBC:   Recent Labs   Lab 11/02/23 2206 11/03/23 0448 11/04/23 0451   WBC 15.65* 16.57* 12.90*   HGB 7.8* 8.2* 7.3*   HCT 22.8* 23.8* 21.8*   * 146* 178     CMP:   Recent Labs   Lab 11/03/23  0448 11/04/23  0451   * 134*   K 4.3 3.7    101   CO2 31* 29   * 111*   BUN 20 15   CREATININE 1.2 1.1   CALCIUM 8.8 8.6*   PROT 6.2 6.2   ALBUMIN 3.3* 3.1*   BILITOT 1.2* 0.9   ALKPHOS 42* 51*   AST 93* 84*   ALT 39 47*    ANIONGAP 1* 4*       Significant Imaging: I have reviewed all pertinent imaging results/findings within the past 24 hours.      Assessment/Plan:      * Nontraumatic psoas hematoma    Hematoma recurred after drain placed.  Strength improving.  Continue PT.   Has increase in left thigh swelling today.  Discussed with surgery who recommends observation for now.  We will transfuse 1 unit PRBC for downtrending hemoglobin.  Fever resolved, continue empiric Zosyn.           Hypercholesteremia  continue statin     VTE Risk Mitigation (From admission, onward)         Ordered     IP VTE LOW RISK PATIENT  Once         10/31/23 0209                Discharge Planning   MAGDALENA: 11/5/2023     Code Status: Full Code   Is the patient medically ready for discharge?:     Reason for patient still in hospital (select all that apply): Patient trending condition and Consult recommendations  Discharge Plan A: Home with family                  Marily Crawley MD  Department of Hospital Medicine   [facility]

## 2023-11-05 LAB
ALBUMIN SERPL BCP-MCNC: 3.2 G/DL (ref 3.5–5.2)
ALP SERPL-CCNC: 74 U/L (ref 55–135)
ALT SERPL W/O P-5'-P-CCNC: 107 U/L (ref 10–44)
ANION GAP SERPL CALC-SCNC: 2 MMOL/L (ref 8–16)
AST SERPL-CCNC: 130 U/L (ref 10–40)
BASOPHILS # BLD AUTO: 0.06 K/UL (ref 0–0.2)
BASOPHILS NFR BLD: 0.5 % (ref 0–1.9)
BILIRUB SERPL-MCNC: 1.5 MG/DL (ref 0.1–1)
BUN SERPL-MCNC: 12 MG/DL (ref 8–23)
CALCIUM SERPL-MCNC: 8.9 MG/DL (ref 8.7–10.5)
CHLORIDE SERPL-SCNC: 104 MMOL/L (ref 95–110)
CO2 SERPL-SCNC: 31 MMOL/L (ref 23–29)
CREAT SERPL-MCNC: 1 MG/DL (ref 0.5–1.4)
DIFFERENTIAL METHOD: ABNORMAL
EOSINOPHIL # BLD AUTO: 0.6 K/UL (ref 0–0.5)
EOSINOPHIL NFR BLD: 5.2 % (ref 0–8)
ERYTHROCYTE [DISTWIDTH] IN BLOOD BY AUTOMATED COUNT: 13.3 % (ref 11.5–14.5)
EST. GFR  (NO RACE VARIABLE): >60 ML/MIN/1.73 M^2
GLUCOSE SERPL-MCNC: 109 MG/DL (ref 70–110)
HCT VFR BLD AUTO: 24.8 % (ref 40–54)
HCT VFR BLD AUTO: 25.2 % (ref 40–54)
HGB BLD-MCNC: 8.5 G/DL (ref 14–18)
HGB BLD-MCNC: 8.6 G/DL (ref 14–18)
IMM GRANULOCYTES # BLD AUTO: 0.05 K/UL (ref 0–0.04)
IMM GRANULOCYTES NFR BLD AUTO: 0.5 % (ref 0–0.5)
LYMPHOCYTES # BLD AUTO: 1.2 K/UL (ref 1–4.8)
LYMPHOCYTES NFR BLD: 10.5 % (ref 18–48)
MCH RBC QN AUTO: 28.3 PG (ref 27–31)
MCHC RBC AUTO-ENTMCNC: 34.3 G/DL (ref 32–36)
MCV RBC AUTO: 83 FL (ref 82–98)
MONOCYTES # BLD AUTO: 1.3 K/UL (ref 0.3–1)
MONOCYTES NFR BLD: 12.1 % (ref 4–15)
NEUTROPHILS # BLD AUTO: 7.8 K/UL (ref 1.8–7.7)
NEUTROPHILS NFR BLD: 71.2 % (ref 38–73)
NRBC BLD-RTO: 0 /100 WBC
PLATELET # BLD AUTO: 220 K/UL (ref 150–450)
PMV BLD AUTO: 11.8 FL (ref 9.2–12.9)
POTASSIUM SERPL-SCNC: 3.9 MMOL/L (ref 3.5–5.1)
PROT SERPL-MCNC: 6.4 G/DL (ref 6–8.4)
RBC # BLD AUTO: 3 M/UL (ref 4.6–6.2)
SODIUM SERPL-SCNC: 137 MMOL/L (ref 136–145)
WBC # BLD AUTO: 10.96 K/UL (ref 3.9–12.7)

## 2023-11-05 PROCEDURE — 80053 COMPREHEN METABOLIC PANEL: CPT | Performed by: NURSE PRACTITIONER

## 2023-11-05 PROCEDURE — 85025 COMPLETE CBC W/AUTO DIFF WBC: CPT | Performed by: NURSE PRACTITIONER

## 2023-11-05 PROCEDURE — 12000002 HC ACUTE/MED SURGE SEMI-PRIVATE ROOM

## 2023-11-05 PROCEDURE — 85018 HEMOGLOBIN: CPT | Performed by: HOSPITALIST

## 2023-11-05 PROCEDURE — 63600175 PHARM REV CODE 636 W HCPCS: Performed by: SURGERY

## 2023-11-05 PROCEDURE — 36415 COLL VENOUS BLD VENIPUNCTURE: CPT | Performed by: HOSPITALIST

## 2023-11-05 PROCEDURE — 25000003 PHARM REV CODE 250: Performed by: SURGERY

## 2023-11-05 PROCEDURE — 85014 HEMATOCRIT: CPT | Performed by: HOSPITALIST

## 2023-11-05 PROCEDURE — 97116 GAIT TRAINING THERAPY: CPT

## 2023-11-05 PROCEDURE — 25000003 PHARM REV CODE 250: Performed by: HOSPITALIST

## 2023-11-05 RX ORDER — DOCUSATE SODIUM 100 MG/1
100 CAPSULE, LIQUID FILLED ORAL DAILY
Status: DISCONTINUED | OUTPATIENT
Start: 2023-11-05 | End: 2023-11-09 | Stop reason: HOSPADM

## 2023-11-05 RX ADMIN — HYDROCODONE BITARTRATE AND ACETAMINOPHEN 2 TABLET: 10; 325 TABLET ORAL at 09:11

## 2023-11-05 RX ADMIN — PIPERACILLIN SODIUM AND TAZOBACTAM SODIUM 3.38 G: 3; .375 INJECTION, POWDER, LYOPHILIZED, FOR SOLUTION INTRAVENOUS at 04:11

## 2023-11-05 RX ADMIN — SODIUM CHLORIDE: 0.9 INJECTION, SOLUTION INTRAVENOUS at 06:11

## 2023-11-05 RX ADMIN — PIPERACILLIN SODIUM AND TAZOBACTAM SODIUM 3.38 G: 3; .375 INJECTION, POWDER, LYOPHILIZED, FOR SOLUTION INTRAVENOUS at 09:11

## 2023-11-05 RX ADMIN — PIPERACILLIN SODIUM AND TAZOBACTAM SODIUM 3.38 G: 3; .375 INJECTION, POWDER, LYOPHILIZED, FOR SOLUTION INTRAVENOUS at 12:11

## 2023-11-05 RX ADMIN — HYDROCODONE BITARTRATE AND ACETAMINOPHEN 2 TABLET: 10; 325 TABLET ORAL at 03:11

## 2023-11-05 RX ADMIN — DOCUSATE SODIUM 100 MG: 100 CAPSULE, LIQUID FILLED ORAL at 09:11

## 2023-11-05 NOTE — PLAN OF CARE
Pt AAOx 4. Slept well this shift. No acute events overnight. Wife at bedside assisting with wipe-off this morning. No signs, symptoms, or complaints of distress at this time. Care ongoing.     Problem: Pain Acute  Goal: Acceptable Pain Control and Functional Ability  Outcome: Ongoing, Progressing

## 2023-11-05 NOTE — PROGRESS NOTES
Counts include 234 beds at the [facility] Medicine  Progress Note    Patient Name: Jos Espino  MRN: [id number]  Patient Class: IP- Inpatient   Admission Date: 10/30/2023  Length of Stay: 4 days  Attending Physician: Marily Crawley MD  Primary Care Provider: Abiel Salguero MD        Subjective:     Principal Problem:Nontraumatic psoas hematoma        HPI:  68 year old  male presents emergency room with left pelvis/groin pain.  The patient states the pain began yesterday.  He does admit to attending exercise classes that includes exercising his legs with weights about 4 to 5 times a week at the [employer] exercise gym.  But he denied actual trauma.  The patient states his left thigh pain became excruciating with minimal range of motion so he came to emergency room for further evaluation. In the emergency room radiographic imaging revealed a Iliopsoas and left pelvic side wall intramuscular hematomas with a small amount of what is likely hemorrhage within the left retroperitoneum. General surgery was consulted and agreed to see the patient in consult.  The patient will be given pain management and monitored closely. Past medical history significant for hyperlipidemia ED and hypertension that is diet-controlled. The patient is not on anticoagulation and denies excessive NSAID use      Overview/Hospital Course:  68M with PMH HTN, HLD, and BPH s/p TURP is admitted with nontraumatic hematoma to left iliopsoas and pelvic wall. Hemoglobin monitored. Gen surg consulted. Neurology consulted as well for numbness to LLE. Taken to OR for hematoma evacuation with general surgery to inability to extend left knee. Drain in place.      Interval History:  His strength and swelling of his hematoma site are improved.  Pain is controlled.  Fever resolved.  Family at bedside    Review of Systems Complete ROS otherwise negative other than stated in HPI.   Objective:     Vital Signs (Most Recent):  Temp: 97.9 °F (36.6 °C)  "(11/05/23 0724)  Pulse: 71 (11/05/23 0724)  Resp: 18 (11/05/23 0724)  BP: (!) 156/73 (11/05/23 0724)  SpO2: 99 % (11/05/23 0724) Vital Signs (24h Range):  Temp:  [97.8 °F (36.6 °C)-99 °F (37.2 °C)] 97.9 °F (36.6 °C)  Pulse:  [68-97] 71  Resp:  [18-20] 18  SpO2:  [95 %-100 %] 99 %  BP: (136-171)/(63-79) 156/73     Weight: 81.6 kg (180 lb)  Body mass index is 25.83 kg/m².    Intake/Output Summary (Last 24 hours) at 11/5/2023 1001  Last data filed at 11/5/2023 0909  Gross per 24 hour   Intake 1980.5 ml   Output 2600 ml   Net -619.5 ml         Physical Exam  GENERAL:  Alert and oriented x 3  HEENT:  EOMI. Conjunctivae intact. Posterior pharynx clear, oral mucosa moist  NECK:  Supple   LUNGS:  No respiratory distress. Clear to auscultation bilaterally with good air movement  CARDIAC:  RRR without murmur, rub or gallop  ABDOMEN:  Soft,  Nontender and nondistended, no rebound or guarding, bowel sounds present   EXTREMITIES:  Peripheral pulses are 2+. Hands and feet are warm. Good capillary refill in fingers (< 2 seconds). No clubbing, cyanosis or edema.  Left groin hematoma with  drain in place with small amount bloody drainage, surrounding swelling improved, left thigh swelling much improved today        Significant Labs: All pertinent labs within the past 24 hours have been reviewed.  BMP:   Recent Labs   Lab 11/05/23 0635         K 3.9      CO2 31*   BUN 12   CREATININE 1.0   CALCIUM 8.9     CBC:   Recent Labs   Lab 11/04/23 0451 11/05/23 0635   WBC 12.90* 10.96   HGB 7.3* 8.6*  8.5*   HCT 21.8* 25.2*  24.8*    220     CMP:   Recent Labs   Lab 11/04/23 0451 11/05/23 0635   * 137   K 3.7 3.9    104   CO2 29 31*   * 109   BUN 15 12   CREATININE 1.1 1.0   CALCIUM 8.6* 8.9   PROT 6.2 6.4   ALBUMIN 3.1* 3.2*   BILITOT 0.9 1.5*   ALKPHOS 51* 74   AST 84* 130*   ALT 47* 107*   ANIONGAP 4* 2*     Cardiac Markers: No results for input(s): "CKMB", "MYOGLOBIN", "BNP", "TROPISTAT" " in the last 48 hours.    Significant Imaging: I have reviewed all pertinent imaging results/findings within the past 24 hours.      Assessment/Plan:      * Nontraumatic psoas hematoma  Improving.  Continue drain, responded well to blood transfusion yesterday.  Fever resolved.  Continue empiric Zosyn.  Continue to monitor CBC.  General surgery following.  Continue PT and OT.  Patient interested in inpatient rehab, consult case management           Transaminitis/hyperbilirubinemia  Increasing.  May be related to hemolysis from hematoma.  He is asymptomatic with no abdominal pain or tenderness on exam.  Not likely caused by medications.  We will trend tomorrow and if continues to increase we will check right upper quadrant ultrasound       Hypercholesteremia  Hold statin for transaminitis    VTE Risk Mitigation (From admission, onward)         Ordered     IP VTE LOW RISK PATIENT  Once         10/31/23 0209                Discharge Planning   MAGDALENA: 11/5/2023     Code Status: Full Code   Is the patient medically ready for discharge?:     Reason for patient still in hospital (select all that apply): Laboratory test, Treatment, Consult recommendations and Pending disposition  Discharge Plan A: Home with family                  Marily Crawley MD  Department of Hospital Medicine   [facility]

## 2023-11-05 NOTE — PROGRESS NOTES
Pt seen and examined.  Doing well.  Less swelling in the left leg    VSS  AF  Left leg is less swollen.  Incision is slightly more indurated, possible subcutaneous hematoma vs fat necrosis  Drain s/s    H&H stable after 1 unit PRBC yesterday    Continue to trend exam and labs  Seems to be stabilizing and possibly approaching DC

## 2023-11-05 NOTE — SUBJECTIVE & OBJECTIVE
Interval History:  His strength and swelling of his hematoma site are improved.  Pain is controlled.  Fever resolved.  Family at bedside    Review of Systems Complete ROS otherwise negative other than stated in HPI.   Objective:     Vital Signs (Most Recent):  Temp: 97.9 °F (36.6 °C) (11/05/23 0724)  Pulse: 71 (11/05/23 0724)  Resp: 18 (11/05/23 0724)  BP: (!) 156/73 (11/05/23 0724)  SpO2: 99 % (11/05/23 0724) Vital Signs (24h Range):  Temp:  [97.8 °F (36.6 °C)-99 °F (37.2 °C)] 97.9 °F (36.6 °C)  Pulse:  [68-97] 71  Resp:  [18-20] 18  SpO2:  [95 %-100 %] 99 %  BP: (136-171)/(63-79) 156/73     Weight: 81.6 kg (180 lb)  Body mass index is 25.83 kg/m².    Intake/Output Summary (Last 24 hours) at 11/5/2023 1001  Last data filed at 11/5/2023 0909  Gross per 24 hour   Intake 1980.5 ml   Output 2600 ml   Net -619.5 ml         Physical Exam  GENERAL:  Alert and oriented x 3  HEENT:  EOMI. Conjunctivae intact. Posterior pharynx clear, oral mucosa moist  NECK:  Supple   LUNGS:  No respiratory distress. Clear to auscultation bilaterally with good air movement  CARDIAC:  RRR without murmur, rub or gallop  ABDOMEN:  Soft,  Nontender and nondistended, no rebound or guarding, bowel sounds present   EXTREMITIES:  Peripheral pulses are 2+. Hands and feet are warm. Good capillary refill in fingers (< 2 seconds). No clubbing, cyanosis or edema.  Left groin hematoma with  drain in place with small amount bloody drainage, surrounding swelling improved, left thigh swelling much improved today        Significant Labs: All pertinent labs within the past 24 hours have been reviewed.  BMP:   Recent Labs   Lab 11/05/23  0635         K 3.9      CO2 31*   BUN 12   CREATININE 1.0   CALCIUM 8.9     CBC:   Recent Labs   Lab 11/04/23  0451 11/05/23  0635   WBC 12.90* 10.96   HGB 7.3* 8.6*  8.5*   HCT 21.8* 25.2*  24.8*    220     CMP:   Recent Labs   Lab 11/04/23  0451 11/05/23  0635   * 137   K 3.7 3.9     "104   CO2 29 31*   * 109   BUN 15 12   CREATININE 1.1 1.0   CALCIUM 8.6* 8.9   PROT 6.2 6.4   ALBUMIN 3.1* 3.2*   BILITOT 0.9 1.5*   ALKPHOS 51* 74   AST 84* 130*   ALT 47* 107*   ANIONGAP 4* 2*     Cardiac Markers: No results for input(s): "CKMB", "MYOGLOBIN", "BNP", "TROPISTAT" in the last 48 hours.    Significant Imaging: I have reviewed all pertinent imaging results/findings within the past 24 hours.  "

## 2023-11-05 NOTE — PT/OT/SLP PROGRESS
Physical Therapy Treatment    Patient Name:  Jos Espino   MRN:  [id number]    Recommendations:     Discharge Recommendations: High Intensity Therapy  Discharge Equipment Recommendations: walker, rolling  Barriers to discharge:   decrease left LE function, high fall risk, decrease activity tolerance, increase assist with mobility    Assessment:     Jos Espino is a 68 y.o. male admitted with a medical diagnosis of Nontraumatic psoas hematoma.  He presents with the following impairments/functional limitations: weakness, impaired endurance, impaired functional mobility, impaired self care skills, gait instability, impaired balance, decreased lower extremity function, decreased safety awareness, pain, impaired cardiopulmonary response to activity.    Pt found in bed with HOB elevated and wife at bedside. Pt agreeable to visit. Pt continue to present with poor quad contraction/strength. Pt with improved gait distance and RW management but safety with ambulation still limited due to impaired quad strength greatest with LLE terminal knee extension. .     Rehab Prognosis: Fair; patient would benefit from acute skilled PT services to address these deficits and reach maximum level of function.    Recent Surgery: Procedure(s) (LRB):  INCISION AND DRAINAGE, HEMATOMA (Left) 4 Days Post-Op    Plan:     During this hospitalization, patient to be seen daily to address the identified rehab impairments via gait training, therapeutic activities, therapeutic exercises, neuromuscular re-education and progress toward the following goals:    Plan of Care Expires:  12/05/23    Subjective     Chief Complaint: left leg weakness  Patient/Family Comments/goals:  go to rehab  Pain/Comfort:  Pain Rating 1: 1/10  Location - Side 1: Left  Location 1:  (thigh)  Pain Addressed 1: Reposition, Distraction, Cessation of Activity      Objective:     Communicated with RN prior to session.  Patient found HOB elevated with peripheral IV, telemetry, SARAH  drain upon PT entry to room.     General Precautions: Standard, fall  Orthopedic Precautions: N/A  Braces: N/A  Respiratory Status: Room air     Functional Mobility:  Bed Mobility:     Supine to Sit: supervision  Transfers:     Sit to Stand:  contact guard assistance with rolling walker  Gait: 75 ft x 3 with RW and min A with gait deficits as stated above      AM-PAC 6 CLICK MOBILITY          Treatment & Education:  Pt educated on POC, discharge recommendation, need for assist with mobility, importance of time OOB, optimal gait pattern, RW management, quad activation, use of call bell to seek assistance as needed and fall prevention    Patient left sitting edge of bed with all lines intact, call button in reach, and wife present..    GOALS:   Multidisciplinary Problems       Physical Therapy Goals          Problem: Physical Therapy    Goal Priority Disciplines Outcome Goal Variances Interventions   Physical Therapy Goal     PT, PT/OT Ongoing, Progressing     Description: Goals to be met by: 23     Patient will increase functional independence with mobility by performin. Supine to sit with Supervision  2. Sit to stand transfer with Supervision  3. Bed to chair transfer with Supervision using Rolling Walker  4. Gait  x 150 feet with Supervision using Rolling Walker.                              Time Tracking:     PT Received On: 23  PT Start Time: 930     PT Stop Time: 940  PT Total Time (min): 10 min     Billable Minutes: Gait Training 10    Treatment Type: Treatment  PT/PTA: PT     Number of PTA visits since last PT visit: 0     2023

## 2023-11-06 LAB
ALBUMIN SERPL BCP-MCNC: 3.2 G/DL (ref 3.5–5.2)
ALP SERPL-CCNC: 78 U/L (ref 55–135)
ALT SERPL W/O P-5'-P-CCNC: 87 U/L (ref 10–44)
ANION GAP SERPL CALC-SCNC: 2 MMOL/L (ref 8–16)
AST SERPL-CCNC: 81 U/L (ref 10–40)
BASOPHILS # BLD AUTO: 0.07 K/UL (ref 0–0.2)
BASOPHILS NFR BLD: 0.6 % (ref 0–1.9)
BILIRUB SERPL-MCNC: 1.4 MG/DL (ref 0.1–1)
BUN SERPL-MCNC: 11 MG/DL (ref 8–23)
CALCIUM SERPL-MCNC: 9.4 MG/DL (ref 8.7–10.5)
CHLORIDE SERPL-SCNC: 105 MMOL/L (ref 95–110)
CO2 SERPL-SCNC: 31 MMOL/L (ref 23–29)
CREAT SERPL-MCNC: 1 MG/DL (ref 0.5–1.4)
DIFFERENTIAL METHOD: ABNORMAL
EOSINOPHIL # BLD AUTO: 0.6 K/UL (ref 0–0.5)
EOSINOPHIL NFR BLD: 5 % (ref 0–8)
ERYTHROCYTE [DISTWIDTH] IN BLOOD BY AUTOMATED COUNT: 13.7 % (ref 11.5–14.5)
EST. GFR  (NO RACE VARIABLE): >60 ML/MIN/1.73 M^2
GLUCOSE SERPL-MCNC: 107 MG/DL (ref 70–110)
HCT VFR BLD AUTO: 25.6 % (ref 40–54)
HGB BLD-MCNC: 8.8 G/DL (ref 14–18)
IMM GRANULOCYTES # BLD AUTO: 0.09 K/UL (ref 0–0.04)
IMM GRANULOCYTES NFR BLD AUTO: 0.8 % (ref 0–0.5)
LYMPHOCYTES # BLD AUTO: 1.4 K/UL (ref 1–4.8)
LYMPHOCYTES NFR BLD: 12.6 % (ref 18–48)
MCH RBC QN AUTO: 28.2 PG (ref 27–31)
MCHC RBC AUTO-ENTMCNC: 34.4 G/DL (ref 32–36)
MCV RBC AUTO: 82 FL (ref 82–98)
MONOCYTES # BLD AUTO: 1.6 K/UL (ref 0.3–1)
MONOCYTES NFR BLD: 14 % (ref 4–15)
NEUTROPHILS # BLD AUTO: 7.5 K/UL (ref 1.8–7.7)
NEUTROPHILS NFR BLD: 67 % (ref 38–73)
NRBC BLD-RTO: 0 /100 WBC
PLATELET # BLD AUTO: 292 K/UL (ref 150–450)
PMV BLD AUTO: 11.5 FL (ref 9.2–12.9)
POTASSIUM SERPL-SCNC: 4.6 MMOL/L (ref 3.5–5.1)
PROT SERPL-MCNC: 6.5 G/DL (ref 6–8.4)
RBC # BLD AUTO: 3.12 M/UL (ref 4.6–6.2)
SODIUM SERPL-SCNC: 138 MMOL/L (ref 136–145)
WBC # BLD AUTO: 11.16 K/UL (ref 3.9–12.7)

## 2023-11-06 PROCEDURE — 85025 COMPLETE CBC W/AUTO DIFF WBC: CPT | Performed by: NURSE PRACTITIONER

## 2023-11-06 PROCEDURE — 97116 GAIT TRAINING THERAPY: CPT | Mod: CQ

## 2023-11-06 PROCEDURE — 25000003 PHARM REV CODE 250: Performed by: HOSPITALIST

## 2023-11-06 PROCEDURE — 25000003 PHARM REV CODE 250: Performed by: SURGERY

## 2023-11-06 PROCEDURE — 12000002 HC ACUTE/MED SURGE SEMI-PRIVATE ROOM

## 2023-11-06 PROCEDURE — 36415 COLL VENOUS BLD VENIPUNCTURE: CPT | Performed by: NURSE PRACTITIONER

## 2023-11-06 PROCEDURE — 63600175 PHARM REV CODE 636 W HCPCS: Performed by: SURGERY

## 2023-11-06 PROCEDURE — 80053 COMPREHEN METABOLIC PANEL: CPT | Performed by: NURSE PRACTITIONER

## 2023-11-06 RX ORDER — HYDRALAZINE HYDROCHLORIDE 25 MG/1
50 TABLET, FILM COATED ORAL EVERY 6 HOURS PRN
Status: DISCONTINUED | OUTPATIENT
Start: 2023-11-06 | End: 2023-11-09 | Stop reason: HOSPADM

## 2023-11-06 RX ADMIN — DOCUSATE SODIUM 100 MG: 100 CAPSULE, LIQUID FILLED ORAL at 08:11

## 2023-11-06 RX ADMIN — MORPHINE SULFATE 4 MG: 4 INJECTION, SOLUTION INTRAMUSCULAR; INTRAVENOUS at 01:11

## 2023-11-06 RX ADMIN — HYDROCODONE BITARTRATE AND ACETAMINOPHEN 2 TABLET: 10; 325 TABLET ORAL at 07:11

## 2023-11-06 RX ADMIN — HYDRALAZINE HYDROCHLORIDE 50 MG: 25 TABLET ORAL at 08:11

## 2023-11-06 RX ADMIN — PIPERACILLIN SODIUM AND TAZOBACTAM SODIUM 3.38 G: 3; .375 INJECTION, POWDER, LYOPHILIZED, FOR SOLUTION INTRAVENOUS at 05:11

## 2023-11-06 RX ADMIN — HYDROCODONE BITARTRATE AND ACETAMINOPHEN 2 TABLET: 10; 325 TABLET ORAL at 01:11

## 2023-11-06 RX ADMIN — HYDRALAZINE HYDROCHLORIDE 50 MG: 25 TABLET ORAL at 05:11

## 2023-11-06 RX ADMIN — PIPERACILLIN SODIUM AND TAZOBACTAM SODIUM 3.38 G: 3; .375 INJECTION, POWDER, LYOPHILIZED, FOR SOLUTION INTRAVENOUS at 12:11

## 2023-11-06 RX ADMIN — PIPERACILLIN SODIUM AND TAZOBACTAM SODIUM 3.38 G: 3; .375 INJECTION, POWDER, LYOPHILIZED, FOR SOLUTION INTRAVENOUS at 08:11

## 2023-11-06 NOTE — PROGRESS NOTES
"[facility]  Adult Nutrition   Progress Note (Initial Assessment)     SUMMARY     Recommendations  Recommendation/Intervention:   1. Continue with Regular Diet order as medically appropriate.   2. Will follow and make further recommendations prn    Goals:   1. Pt to continue to achieve >75% EEN/EPN. 2. Pt constipation to resolve  Nutrition Goal Status: new    Dietitian Rounds Brief  Pt is a 67 y/o male admitted with nontraumatic psoas hematoma and pmhx including hypercholesteremia. Met with pt bedside to conduct initial nutrition assessment.    Mr. Espino states that his current intake is good, with 75% of provided meals consumed. His UBW is 179-181lbs--he noted no recent changes in appetite or weight loss. Mr. Espino did indicate that he has been constipated since admission, and that this is not usual for him. Discussed the addition of prune juice to help resolve--pt declined.     Mr. Espino has good intake with no recent weight loss or other major nutritional risk. Recommend continuing with Regular Diet order. Will follow and make further recs prn.    Diet order:   Current Diet Order: Regular                 Evaluation of Received Nutrient/Fluid Intake  Energy Calories Required: meeting needs  Protein Required: meeting needs  Tolerance: tolerating     % Intake of Estimated Energy Needs: 75 - 100 %  % Meal Intake: 75 - 100 %      Intake/Output Summary (Last 24 hours) at 11/6/2023 1229  Last data filed at 11/6/2023 1206  Gross per 24 hour   Intake 340 ml   Output 3950 ml   Net -3610 ml        Anthropometrics  Temp: 98.2 °F (36.8 °C)  Height Method: Stated  Height: 5' 10" (177.8 cm)  Height (inches): 70 in  Weight Method: Standard Scale  Weight: 81.6 kg (180 lb)  Weight (lb): 180 lb  Ideal Body Weight (IBW), Male: 166 lb  % Ideal Body Weight, Male (lb): 108.43 %  BMI (Calculated): 25.8  BMI Grade: 25 - 29.9 - overweight       Estimated/Assessed Needs  Weight Used For Calorie Calculations: 81.6 kg (179 lb 14.3 " oz)  Energy Calorie Requirements (kcal): 1202-6951 kcal (25-35 kcal/kg)  Energy Need Method: Kcal/kg  Protein Requirements: 98g-123g (1.2-1.5g/kg)  Weight Used For Protein Calculations: 81.6 kg (179 lb 14.3 oz)        RDA Method (mL): 2040       Reason for Assessment  Reason For Assessment: length of stay  Diagnosis: hemorrhage  Relevant Medical History: nontraumatic psoas hematoma, HDL    Nutrition/Diet History  Spiritual, Cultural Beliefs, Jainism Practices, Values that Affect Care: no  Factors Affecting Nutritional Intake: None identified at this time    Nutrition Risk Screen  Nutrition Risk Screen: no indicators present     MST Score: 0  Have you recently lost weight without trying?: No  Weight loss score: 0  Have you been eating poorly because of a decreased appetite?: No  Appetite score: 0       Weight History:  Wt Readings from Last 5 Encounters:   10/30/23 81.6 kg (180 lb)   06/05/23 82.7 kg (182 lb 5.1 oz)   06/03/22 79.4 kg (175 lb)   04/07/22 79.4 kg (175 lb)   03/29/22 81.6 kg (180 lb)        Medications:Pertinent Medications Reviewed  Scheduled Meds:   docusate sodium  100 mg Oral Daily    fentaNYL  1 patch Transdermal Q72H    piperacillin-tazobactam (Zosyn) IV (PEDS and ADULTS) (extended infusion is not appropriate)  3.375 g Intravenous Q8H     Continuous Infusions:   sodium chloride 0.9% 125 mL/hr at 11/05/23 2100     PRN Meds:.0.9%  NaCl infusion (for blood administration), acetaminophen, hydrALAZINE, HYDROcodone-acetaminophen, melatonin, morphine, ondansetron, sodium chloride 0.9%    Labs: Pertinent Labs Reviewed  Clinical Chemistry:  Recent Labs   Lab 11/04/23  0451 11/05/23  0635 11/06/23  0528   * 137 138   K 3.7 3.9 4.6    104 105   CO2 29 31* 31*   * 109 107   BUN 15 12 11   CREATININE 1.1 1.0 1.0   CALCIUM 8.6* 8.9 9.4   PROT 6.2 6.4 6.5   ALBUMIN 3.1* 3.2* 3.2*   BILITOT 0.9 1.5* 1.4*   ALKPHOS 51* 74 78   AST 84* 130* 81*   ALT 47* 107* 87*   ANIONGAP 4* 2* 2*     CBC:    Recent Labs   Lab 11/06/23  0528   WBC 11.16   RBC 3.12*   HGB 8.8*   HCT 25.6*      MCV 82   MCH 28.2   MCHC 34.4         Monitor and Evaluation  Food and Nutrient Intake: energy intake, food and beverage intake  Food and Nutrient Adminstration: diet order  Knowledge/Beliefs/Attitudes: food and nutrition knowledge/skill, beliefs and attitudes  Physical Activity and Function: nutrition-related ADLs and IADLs, factors affecting access to physical activity  Anthropometric Measurements: weight, weight change, body mass index, growth pattern indices/percentile ranks  Biochemical Data, Medical Tests and Procedures: electrolyte and renal panel, gastrointestinal profile, glucose/endocrine profile, inflammatory profile, lipid profile  Nutrition-Focused Physical Findings: overall appearance, extremities, muscles and bones, head and eyes, skin     Nutrition Risk  Level of Risk/Frequency of Follow-up: moderate     Nutrition Follow-Up  RD Follow-up?: Yes      Gil Willams Provisional Licensed Dietitian 11/06/2023 12:29 PM

## 2023-11-06 NOTE — PLAN OF CARE
Pt AAOx 4. Slept on and off this shift. Pain managed with PRN medications. No acute events overnight. No signs, symptoms, or complaints of distress at this time. Care ongoing.     Problem: Pain Acute  Goal: Acceptable Pain Control and Functional Ability  Outcome: Ongoing, Progressing

## 2023-11-06 NOTE — PROGRESS NOTES
Feeling much better  Moving leg more  Much less pain  A/P  Retro p hematoma  Plan remove drain tomorrow then possible discharge

## 2023-11-06 NOTE — PROGRESS NOTES
[facility] Medicine  Progress Note    Patient Name: Jos Espino  MRN: [id number]  Patient Class: IP- Inpatient   Admission Date: 10/30/2023  Length of Stay: 5 days  Attending Physician: Marily Crawley MD  Primary Care Provider: Abiel Salguero MD        Subjective:     Principal Problem:Nontraumatic psoas hematoma        HPI:  68 year old  male presents emergency room with left pelvis/groin pain.  The patient states the pain began yesterday.  He does admit to attending exercise classes that includes exercising his legs with weights about 4 to 5 times a week at the [employer] exercise gym.  But he denied actual trauma.  The patient states his left thigh pain became excruciating with minimal range of motion so he came to emergency room for further evaluation. In the emergency room radiographic imaging revealed a Iliopsoas and left pelvic side wall intramuscular hematomas with a small amount of what is likely hemorrhage within the left retroperitoneum. General surgery was consulted and agreed to see the patient in consult.  The patient will be given pain management and monitored closely. Past medical history significant for hyperlipidemia ED and hypertension that is diet-controlled. The patient is not on anticoagulation and denies excessive NSAID use      Overview/Hospital Course:  68M with PMH HTN, HLD, and BPH s/p TURP is admitted with nontraumatic hematoma to left iliopsoas and pelvic wall. Hemoglobin monitored. Gen surg consulted. Neurology consulted as well for numbness to LLE. Taken to OR for hematoma evacuation with general surgery to inability to extend left knee. Drain in place.      Interval History:  Ambulating better.  Still difficulty lifting his leg.  Afebrile.  Drain in place.  Pain controlled    Review of Systems Complete ROS otherwise negative other than stated in HPI.   Objective:     Vital Signs (Most Recent):  Temp: 98.2 °F (36.8 °C) (11/06/23 1101)  Pulse: 94  "(11/06/23 1101)  Resp: 18 (11/06/23 1101)  BP: (!) 156/70 (notified nurse sepideh) (11/06/23 1101)  SpO2: 98 % (11/06/23 1101) Vital Signs (24h Range):  Temp:  [98.2 °F (36.8 °C)-99.1 °F (37.3 °C)] 98.2 °F (36.8 °C)  Pulse:  [75-94] 94  Resp:  [16-18] 18  SpO2:  [97 %-99 %] 98 %  BP: (156-182)/(70-83) 156/70     Weight: 81.6 kg (180 lb)  Body mass index is 25.83 kg/m².    Intake/Output Summary (Last 24 hours) at 11/6/2023 1329  Last data filed at 11/6/2023 1206  Gross per 24 hour   Intake 100 ml   Output 3300 ml   Net -3200 ml         Physical Exam  GENERAL:  Alert and oriented x 3  HEENT:  EOMI. Conjunctivae intact. Posterior pharynx clear, oral mucosa moist  NECK:  Supple   LUNGS:  No respiratory distress. Clear to auscultation bilaterally with good air movement  CARDIAC:  RRR without murmur, rub or gallop  ABDOMEN:  Soft,  Nontender and nondistended, no rebound or guarding, bowel sounds present   EXTREMITIES:  Peripheral pulses are 2+. Hands and feet are warm. Good capillary refill in fingers (< 2 seconds). No clubbing, cyanosis or edema.  Left groin hematoma with  drain in place with small amount bloody drainage        Significant Labs: All pertinent labs within the past 24 hours have been reviewed.  Blood Culture: No results for input(s): "LABBLOO" in the last 48 hours.  BMP:   Recent Labs   Lab 11/06/23 0528         K 4.6      CO2 31*   BUN 11   CREATININE 1.0   CALCIUM 9.4     CBC:   Recent Labs   Lab 11/05/23 0635 11/06/23 0528   WBC 10.96 11.16   HGB 8.6*  8.5* 8.8*   HCT 25.2*  24.8* 25.6*    292     CMP:   Recent Labs   Lab 11/05/23 0635 11/06/23 0528    138   K 3.9 4.6    105   CO2 31* 31*    107   BUN 12 11   CREATININE 1.0 1.0   CALCIUM 8.9 9.4   PROT 6.4 6.5   ALBUMIN 3.2* 3.2*   BILITOT 1.5* 1.4*   ALKPHOS 74 78   * 81*   * 87*   ANIONGAP 2* 2*     Magnesium: No results for input(s): "MG" in the last 48 hours.  POCT Glucose: No results " "for input(s): "POCTGLUCOSE" in the last 48 hours.    Significant Imaging: I have reviewed all pertinent imaging results/findings within the past 24 hours.      Assessment/Plan:      * Nontraumatic psoas hematoma  Improving.  Continue drain, management per surgery.  Fever resolved.  Continue empiric Zosyn.  Continue to monitor CBC.   Continue PT and OT.   He would benefit from rehab placement, case management consulted        Transaminitis/hyperbilirubinemia  Improved today.  Likely related to hematoma.  Monitor daily         Hypercholesteremia  Hold statin for transaminitis    VTE Risk Mitigation (From admission, onward)         Ordered     IP VTE LOW RISK PATIENT  Once         10/31/23 0209                Discharge Planning   MAGDALENA: 11/7/2023     Code Status: Full Code   Is the patient medically ready for discharge?:     Reason for patient still in hospital (select all that apply): Patient trending condition  Discharge Plan A: Home with family                  Marily Crawley MD  Department of Hospital Medicine   [facility]  "

## 2023-11-06 NOTE — PT/OT/SLP PROGRESS
Occupational Therapy      Patient Name:  Jos Espino   MRN:  [id number]    Patient not seen today secondary to pt unavailable x 2 attempts.      11/6/2023

## 2023-11-06 NOTE — PT/OT/SLP PROGRESS
Physical Therapy Treatment    Patient Name:  Jos Espino   MRN:  [id number]    Recommendations:     Discharge Recommendations: High Intensity Therapy  Discharge Equipment Recommendations: walker, rolling  Barriers to discharge:  decreased activity tolerance, pain, balance deficits, increased assist with mobility    Assessment:     Jos Espino is a 68 y.o. male admitted with a medical diagnosis of Nontraumatic psoas hematoma.  He presents with the following impairments/functional limitations: weakness, impaired endurance, impaired self care skills, impaired functional mobility, gait instability, impaired balance, decreased coordination, decreased ROM, impaired cardiopulmonary response to activity, decreased safety awareness.    Pt up in chair and agreeable to visit. Pt required min assist for sit to stand transfer with RW. Pt ambulated 90' x 3 with RW and min to contact guard assist. Pt continues to have poor quad contraction and strength. Pt also noted to have decreased hip flexion with reports of L groin pain. Pt required some verbal cuing to improve toe clearance on LLE. Pt with some L knee buckling at the end of ambulation secondary to fatigue. Pt returned to bed at end of session with stand by assist hooking RLE under LLE to lift onto the bed.    Rehab Prognosis: Fair; patient would benefit from acute skilled PT services to address these deficits and reach maximum level of function.    Recent Surgery: Procedure(s) (LRB):  INCISION AND DRAINAGE, HEMATOMA (Left) 5 Days Post-Op    Plan:     During this hospitalization, patient to be seen daily to address the identified rehab impairments via gait training, therapeutic activities, therapeutic exercises, neuromuscular re-education and progress toward the following goals:    Plan of Care Expires:  12/05/23    Subjective     Chief Complaint: L groin pain  Patient/Family Comments/goals: to get better  Pain/Comfort:  Pain Rating 1: other (see comments) (not  rated)  Location - Side 1: Left  Location 1: groin  Pain Addressed 1: Reposition, Distraction, Cessation of Activity  Pain Rating Post-Intervention 1: other (see comments) (not rated)      Objective:     Communicated with RN prior to session.  Patient found up in chair with peripheral IV, telemetry, SARAH drain upon PT entry to room.     General Precautions: Standard, fall  Orthopedic Precautions: N/A  Braces: N/A  Respiratory Status: Room air     Functional Mobility:  Bed Mobility:     Scooting: stand by assistance  Sit to Supine: stand by assistance  Transfers:     Sit to Stand:  minimum assistance with rolling walker  Gait: 3 x 90' with RW and min-CGA, gait deficits as stated above      AM-PAC 6 CLICK MOBILITY          Treatment & Education:  Pt educated on importance of time OOB, importance of intermittent mobility, safe techniques for transfers/ambulation, discharge recommendations/options, and use of call light for assistance and fall prevention.      Patient left HOB elevated with all lines intact, call button in reach, and RN notified..    GOALS:   Multidisciplinary Problems       Physical Therapy Goals          Problem: Physical Therapy    Goal Priority Disciplines Outcome Goal Variances Interventions   Physical Therapy Goal     PT, PT/OT Ongoing, Progressing     Description: Goals to be met by: 23     Patient will increase functional independence with mobility by performin. Supine to sit with Supervision  2. Sit to stand transfer with Supervision  3. Bed to chair transfer with Supervision using Rolling Walker  4. Gait  x 150 feet with Supervision using Rolling Walker.                              Time Tracking:     PT Received On: 23  PT Start Time: 1118     PT Stop Time: 1131  PT Total Time (min): 13 min     Billable Minutes: Gait Training 13    Treatment Type: Treatment  PT/PTA: PTA     Number of PTA visits since last PT visit: 2023

## 2023-11-06 NOTE — SUBJECTIVE & OBJECTIVE
"Interval History:  Ambulating better.  Still difficulty lifting his leg.  Afebrile.  Drain in place.  Pain controlled    Review of Systems Complete ROS otherwise negative other than stated in HPI.   Objective:     Vital Signs (Most Recent):  Temp: 98.2 °F (36.8 °C) (11/06/23 1101)  Pulse: 94 (11/06/23 1101)  Resp: 18 (11/06/23 1101)  BP: (!) 156/70 (notified nurse sepideh) (11/06/23 1101)  SpO2: 98 % (11/06/23 1101) Vital Signs (24h Range):  Temp:  [98.2 °F (36.8 °C)-99.1 °F (37.3 °C)] 98.2 °F (36.8 °C)  Pulse:  [75-94] 94  Resp:  [16-18] 18  SpO2:  [97 %-99 %] 98 %  BP: (156-182)/(70-83) 156/70     Weight: 81.6 kg (180 lb)  Body mass index is 25.83 kg/m².    Intake/Output Summary (Last 24 hours) at 11/6/2023 1329  Last data filed at 11/6/2023 1206  Gross per 24 hour   Intake 100 ml   Output 3300 ml   Net -3200 ml         Physical Exam  GENERAL:  Alert and oriented x 3  HEENT:  EOMI. Conjunctivae intact. Posterior pharynx clear, oral mucosa moist  NECK:  Supple   LUNGS:  No respiratory distress. Clear to auscultation bilaterally with good air movement  CARDIAC:  RRR without murmur, rub or gallop  ABDOMEN:  Soft,  Nontender and nondistended, no rebound or guarding, bowel sounds present   EXTREMITIES:  Peripheral pulses are 2+. Hands and feet are warm. Good capillary refill in fingers (< 2 seconds). No clubbing, cyanosis or edema.  Left groin hematoma with  drain in place with small amount bloody drainage        Significant Labs: All pertinent labs within the past 24 hours have been reviewed.  Blood Culture: No results for input(s): "LABBLOO" in the last 48 hours.  BMP:   Recent Labs   Lab 11/06/23  0528         K 4.6      CO2 31*   BUN 11   CREATININE 1.0   CALCIUM 9.4     CBC:   Recent Labs   Lab 11/05/23  0635 11/06/23  0528   WBC 10.96 11.16   HGB 8.6*  8.5* 8.8*   HCT 25.2*  24.8* 25.6*    292     CMP:   Recent Labs   Lab 11/05/23  0635 11/06/23  0528    138   K 3.9 4.6    " "105   CO2 31* 31*    107   BUN 12 11   CREATININE 1.0 1.0   CALCIUM 8.9 9.4   PROT 6.4 6.5   ALBUMIN 3.2* 3.2*   BILITOT 1.5* 1.4*   ALKPHOS 74 78   * 81*   * 87*   ANIONGAP 2* 2*     Magnesium: No results for input(s): "MG" in the last 48 hours.  POCT Glucose: No results for input(s): "POCTGLUCOSE" in the last 48 hours.    Significant Imaging: I have reviewed all pertinent imaging results/findings within the past 24 hours.  "

## 2023-11-06 NOTE — PLAN OF CARE
met with Pt at bedside to discuss therapy recommendation of high intensity.  explained inpatient rehabilitation to Pt. [facility]able to [facility]  sent referral via [employer] and [facility]       11/06/23 1610   Discharge Reassessment   Assessment Type Discharge Planning Reassessment   Did the patient's condition or plan change since previous assessment? Yes   Discharge Plan discussed with: Patient   Name(s) and Number(s) Blanca Espino (Other)   [phone] (Home Phone)   Communicated MAGDALENA with patient/caregiver Yes   Discharge Plan A Rehab   Discharge Plan B Skilled Nursing Facility   DME Needed Upon Discharge  none   Transition of Care Barriers None   Post-Acute Status   Post-Acute Authorization Placement   Post-Acute Placement Status Referrals Sent   Discharge Delays None known at this time

## 2023-11-07 ENCOUNTER — PATIENT MESSAGE (OUTPATIENT)
Dept: SURGERY | Facility: CLINIC | Age: 68
End: 2023-11-07
Payer: MEDICARE

## 2023-11-07 LAB
ALBUMIN SERPL BCP-MCNC: 3.4 G/DL (ref 3.5–5.2)
ALP SERPL-CCNC: 79 U/L (ref 55–135)
ALT SERPL W/O P-5'-P-CCNC: 79 U/L (ref 10–44)
ANION GAP SERPL CALC-SCNC: 3 MMOL/L (ref 8–16)
AST SERPL-CCNC: 61 U/L (ref 10–40)
BASOPHILS # BLD AUTO: 0.08 K/UL (ref 0–0.2)
BASOPHILS NFR BLD: 0.6 % (ref 0–1.9)
BILIRUB SERPL-MCNC: 1.5 MG/DL (ref 0.1–1)
BUN SERPL-MCNC: 12 MG/DL (ref 8–23)
CALCIUM SERPL-MCNC: 9.6 MG/DL (ref 8.7–10.5)
CHLORIDE SERPL-SCNC: 105 MMOL/L (ref 95–110)
CO2 SERPL-SCNC: 28 MMOL/L (ref 23–29)
CREAT SERPL-MCNC: 1 MG/DL (ref 0.5–1.4)
DIFFERENTIAL METHOD: ABNORMAL
EOSINOPHIL # BLD AUTO: 0.6 K/UL (ref 0–0.5)
EOSINOPHIL NFR BLD: 4.7 % (ref 0–8)
ERYTHROCYTE [DISTWIDTH] IN BLOOD BY AUTOMATED COUNT: 14 % (ref 11.5–14.5)
EST. GFR  (NO RACE VARIABLE): >60 ML/MIN/1.73 M^2
GLUCOSE SERPL-MCNC: 111 MG/DL (ref 70–110)
HCT VFR BLD AUTO: 26.4 % (ref 40–54)
HGB BLD-MCNC: 8.8 G/DL (ref 14–18)
IMM GRANULOCYTES # BLD AUTO: 0.15 K/UL (ref 0–0.04)
IMM GRANULOCYTES NFR BLD AUTO: 1.2 % (ref 0–0.5)
LYMPHOCYTES # BLD AUTO: 1.7 K/UL (ref 1–4.8)
LYMPHOCYTES NFR BLD: 13.2 % (ref 18–48)
MCH RBC QN AUTO: 27.2 PG (ref 27–31)
MCHC RBC AUTO-ENTMCNC: 33.3 G/DL (ref 32–36)
MCV RBC AUTO: 82 FL (ref 82–98)
MONOCYTES # BLD AUTO: 1.8 K/UL (ref 0.3–1)
MONOCYTES NFR BLD: 14.5 % (ref 4–15)
NEUTROPHILS # BLD AUTO: 8.2 K/UL (ref 1.8–7.7)
NEUTROPHILS NFR BLD: 65.8 % (ref 38–73)
NRBC BLD-RTO: 0 /100 WBC
PLATELET # BLD AUTO: 371 K/UL (ref 150–450)
PMV BLD AUTO: 11.5 FL (ref 9.2–12.9)
POTASSIUM SERPL-SCNC: 4.7 MMOL/L (ref 3.5–5.1)
PROT SERPL-MCNC: 6.8 G/DL (ref 6–8.4)
RBC # BLD AUTO: 3.24 M/UL (ref 4.6–6.2)
SODIUM SERPL-SCNC: 136 MMOL/L (ref 136–145)
WBC # BLD AUTO: 12.51 K/UL (ref 3.9–12.7)

## 2023-11-07 PROCEDURE — 25000003 PHARM REV CODE 250: Performed by: SURGERY

## 2023-11-07 PROCEDURE — 80053 COMPREHEN METABOLIC PANEL: CPT | Performed by: NURSE PRACTITIONER

## 2023-11-07 PROCEDURE — 63600175 PHARM REV CODE 636 W HCPCS: Performed by: SURGERY

## 2023-11-07 PROCEDURE — 85025 COMPLETE CBC W/AUTO DIFF WBC: CPT | Performed by: NURSE PRACTITIONER

## 2023-11-07 PROCEDURE — 12000002 HC ACUTE/MED SURGE SEMI-PRIVATE ROOM

## 2023-11-07 PROCEDURE — 36415 COLL VENOUS BLD VENIPUNCTURE: CPT | Performed by: NURSE PRACTITIONER

## 2023-11-07 PROCEDURE — 97530 THERAPEUTIC ACTIVITIES: CPT

## 2023-11-07 PROCEDURE — 25000003 PHARM REV CODE 250: Performed by: HOSPITALIST

## 2023-11-07 PROCEDURE — 97116 GAIT TRAINING THERAPY: CPT

## 2023-11-07 RX ORDER — HYDROCODONE BITARTRATE AND ACETAMINOPHEN 10; 325 MG/1; MG/1
2 TABLET ORAL EVERY 6 HOURS PRN
Qty: 10 TABLET | Refills: 0 | Status: SHIPPED | OUTPATIENT
Start: 2023-11-07 | End: 2023-11-09 | Stop reason: SDUPTHER

## 2023-11-07 RX ORDER — FENTANYL 25 UG/1
1 PATCH TRANSDERMAL
Qty: 15 PATCH | Refills: 0 | Status: SHIPPED | OUTPATIENT
Start: 2023-11-10 | End: 2023-11-07 | Stop reason: HOSPADM

## 2023-11-07 RX ORDER — HYDROCODONE BITARTRATE AND ACETAMINOPHEN 10; 325 MG/1; MG/1
2 TABLET ORAL EVERY 6 HOURS PRN
Qty: 10 TABLET | Refills: 0 | Status: SHIPPED | OUTPATIENT
Start: 2023-11-07 | End: 2023-11-07 | Stop reason: SDUPTHER

## 2023-11-07 RX ADMIN — FENTANYL 1 PATCH: 25 PATCH TRANSDERMAL at 12:11

## 2023-11-07 RX ADMIN — ACETAMINOPHEN 650 MG: 325 TABLET ORAL at 10:11

## 2023-11-07 RX ADMIN — PIPERACILLIN SODIUM AND TAZOBACTAM SODIUM 3.38 G: 3; .375 INJECTION, POWDER, LYOPHILIZED, FOR SOLUTION INTRAVENOUS at 08:11

## 2023-11-07 RX ADMIN — PIPERACILLIN SODIUM AND TAZOBACTAM SODIUM 3.38 G: 3; .375 INJECTION, POWDER, LYOPHILIZED, FOR SOLUTION INTRAVENOUS at 12:11

## 2023-11-07 RX ADMIN — HYDRALAZINE HYDROCHLORIDE 50 MG: 25 TABLET ORAL at 11:11

## 2023-11-07 RX ADMIN — HYDROCODONE BITARTRATE AND ACETAMINOPHEN 2 TABLET: 10; 325 TABLET ORAL at 08:11

## 2023-11-07 RX ADMIN — PIPERACILLIN SODIUM AND TAZOBACTAM SODIUM 3.38 G: 3; .375 INJECTION, POWDER, LYOPHILIZED, FOR SOLUTION INTRAVENOUS at 11:11

## 2023-11-07 RX ADMIN — DOCUSATE SODIUM 100 MG: 100 CAPSULE, LIQUID FILLED ORAL at 08:11

## 2023-11-07 RX ADMIN — SODIUM CHLORIDE: 0.9 INJECTION, SOLUTION INTRAVENOUS at 11:11

## 2023-11-07 RX ADMIN — HYDRALAZINE HYDROCHLORIDE 50 MG: 25 TABLET ORAL at 12:11

## 2023-11-07 RX ADMIN — HYDROCODONE BITARTRATE AND ACETAMINOPHEN 2 TABLET: 10; 325 TABLET ORAL at 02:11

## 2023-11-07 RX ADMIN — HYDROCODONE BITARTRATE AND ACETAMINOPHEN 2 TABLET: 10; 325 TABLET ORAL at 04:11

## 2023-11-07 RX ADMIN — PIPERACILLIN SODIUM AND TAZOBACTAM SODIUM 3.38 G: 3; .375 INJECTION, POWDER, LYOPHILIZED, FOR SOLUTION INTRAVENOUS at 04:11

## 2023-11-07 NOTE — SUBJECTIVE & OBJECTIVE
Interval History:  Doing well today.  Strength improving slowly.  Had drain removed by surgery today.  Afebrile.    Review of Systems Complete ROS otherwise negative other than stated in HPI.   Objective:     Vital Signs (Most Recent):  Temp: 98.5 °F (36.9 °C) (11/07/23 1159)  Pulse: 93 (11/07/23 1159)  Resp: 18 (11/07/23 1159)  BP: (!) 152/73 (notified nurse sepideh) (11/07/23 1159)  SpO2: 100 % (11/07/23 1159) Vital Signs (24h Range):  Temp:  [98 °F (36.7 °C)-98.6 °F (37 °C)] 98.5 °F (36.9 °C)  Pulse:  [75-94] 93  Resp:  [16-20] 18  SpO2:  [97 %-100 %] 100 %  BP: (151-178)/(69-84) 152/73     Weight: 81.6 kg (180 lb)  Body mass index is 25.83 kg/m².    Intake/Output Summary (Last 24 hours) at 11/7/2023 1433  Last data filed at 11/7/2023 0511  Gross per 24 hour   Intake 591.67 ml   Output 2575 ml   Net -1983.33 ml         Physical Exam  GENERAL:  Alert and oriented x 3  HEENT:  EOMI. Conjunctivae intact. Posterior pharynx clear, oral mucosa moist  NECK:  Supple   LUNGS:  No respiratory distress. Clear to auscultation bilaterally with good air movement  CARDIAC:  RRR without murmur, rub or gallop  ABDOMEN:  Soft,  Nontender and nondistended, no rebound or guarding, bowel sounds present   EXTREMITIES:  Peripheral pulses are 2+. Hands and feet are warm. Good capillary refill in fingers (< 2 seconds). No clubbing, cyanosis or edema.         Significant Labs: All pertinent labs within the past 24 hours have been reviewed.  BMP:   Recent Labs   Lab 11/07/23 0523   *      K 4.7      CO2 28   BUN 12   CREATININE 1.0   CALCIUM 9.6     CBC:   Recent Labs   Lab 11/06/23 0528 11/07/23 0523   WBC 11.16 12.51   HGB 8.8* 8.8*   HCT 25.6* 26.4*    371     CMP:   Recent Labs   Lab 11/06/23  0528 11/07/23  0523    136   K 4.6 4.7    105   CO2 31* 28    111*   BUN 11 12   CREATININE 1.0 1.0   CALCIUM 9.4 9.6   PROT 6.5 6.8   ALBUMIN 3.2* 3.4*   BILITOT 1.4* 1.5*   ALKPHOS 78 79   AST 81*  "61*   ALT 87* 79*   ANIONGAP 2* 3*     Cardiac Markers: No results for input(s): "CKMB", "MYOGLOBIN", "BNP", "TROPISTAT" in the last 48 hours.  Magnesium: No results for input(s): "MG" in the last 48 hours.  POCT Glucose: No results for input(s): "POCTGLUCOSE" in the last 48 hours.  Respiratory Culture: No results for input(s): "GSRESP", "RESPIRATORYC" in the last 48 hours.  Troponin: No results for input(s): "TROPONINI", "TROPONINIHS" in the last 48 hours.  TSH: No results for input(s): "TSH" in the last 4320 hours.  Urine Culture: No results for input(s): "LABURIN" in the last 48 hours.    Significant Imaging: I have reviewed all pertinent imaging results/findings within the past 24 hours.  "

## 2023-11-07 NOTE — PLAN OF CARE
Pt AAOx 4. Slept well this shift. Pain managed with PRN medications. No acute events overnight. No signs, symptoms, or complaints of distress at this time. Care ongoing.    Problem: Pain Acute  Goal: Acceptable Pain Control and Functional Ability  Outcome: Ongoing, Progressing

## 2023-11-07 NOTE — PLAN OF CARE
updated Pt with rehab progress. During conversation,  informed Pt about contingency protocol to send referrals to skilled-nursing facilities concurrent with rehabilitation referrals. Pt agreeable to contingency.  placed referrals to skilled nursing within Pt's criteria via careport.    LESLIE called and KAELYN faxed.       11/07/23 7807   Post-Acute Status   Post-Acute Authorization Placement   Post-Acute Placement Status Referrals Sent   Discharge Delays None known at this time   Discharge Plan   Discharge Plan A Rehab   Discharge Plan B Skilled Nursing Facility

## 2023-11-07 NOTE — PLAN OF CARE
Ericka ([facility]) informed  authorization has been requested.     11/07/23 1206   Post-Acute Status   Post-Acute Authorization Placement   Post-Acute Placement Status Pending payor medical review/second level review   Discharge Delays None known at this time   Discharge Plan   Discharge Plan A Rehab   Discharge Plan B Home with family

## 2023-11-07 NOTE — PLAN OF CARE
Problem: Occupational Therapy  Goal: Occupational Therapy Goal  Description: Goals to be met by: 12/2/23     Patient will increase functional independence with ADLs by performing:    LE Dressing with Modified [place] using AD as needed.  Grooming while standing at sink with Modified [place].  Toileting from toilet with Modified [place] for hygiene and clothing management.   Toilet transfer to toilet with Modified [place].    Outcome: Ongoing, Progressing

## 2023-11-07 NOTE — PT/OT/SLP PROGRESS
Occupational Therapy   Treatment    Name: Jos Espino  MRN: [id number]  Admitting Diagnosis:  Nontraumatic psoas hematoma  6 Days Post-Op    Recommendations:     Discharge Recommendations: High Intensity Therapy  Discharge Equipment Recommendations:  walker, rolling  Barriers to discharge:  Decreased caregiver support    Assessment:     Jos Espino is a 68 y.o. male with a medical diagnosis of Nontraumatic psoas hematoma.   Performance deficits affecting function are weakness, impaired endurance, impaired self care skills, impaired functional mobility, impaired balance, gait instability, pain, decreased lower extremity function.     Rehab Prognosis:  Good; patient would benefit from acute skilled OT services to address these deficits and reach maximum level of function.       Plan:     Patient to be seen 6 x/week to address the above listed problems via self-care/home management, therapeutic activities, therapeutic exercises  Plan of Care Expires: 12/02/23  Plan of Care Reviewed with: patient    Subjective     Chief Complaint: decreased LLE function  Patient/Family Comments/goals: go to rehab   Pain/Comfort:  Pain Rating 1: 0/10  Pain Rating Post-Intervention 1: 0/10    Objective:     Communicated with: nurse prior to session.  Patient found supine with SARAH drain, peripheral IV upon OT entry to room.    General Precautions: Standard, fall    Orthopedic Precautions:N/A  Braces: N/A  Respiratory Status: Room air     Occupational Performance:     Bed Mobility:    Patient completed Supine to Sit with stand by assistance  Patient completed Sit to Supine with stand by assistance     Functional Mobility/Transfers:  Patient completed Sit <> Stand Transfer with contact guard assistance  with  rolling walker     Treatment & Education:  Pt was seen for standing balance/ tolerance exercises focusing on improving functional reach as required during ADL's while in unsupported and supported standing.  Pt had one loss of balance due  to L knee buckling requiring Mod A to correct.        Patient left supine with all lines intact, call button in reach, and bed alarm on    GOALS:   Multidisciplinary Problems       Occupational Therapy Goals          Problem: Occupational Therapy    Goal Priority Disciplines Outcome Interventions   Occupational Therapy Goal     OT, PT/OT Ongoing, Progressing    Description: Goals to be met by: 12/2/23     Patient will increase functional independence with ADLs by performing:    LE Dressing with Modified [place] using AD as needed.  Grooming while standing at sink with Modified [place].  Toileting from toilet with Modified [place] for hygiene and clothing management.   Toilet transfer to toilet with Modified [place].                         Time Tracking:     OT Date of Treatment: 11/07/23  OT Start Time: 0955  OT Stop Time: 1012  OT Total Time (min): 17 min    Billable Minutes:Therapeutic Activity 17    OT/PATSY: OT          11/7/2023

## 2023-11-07 NOTE — PT/OT/SLP PROGRESS
Physical Therapy Treatment    Patient Name:  Jos Espino   MRN:  [id number]    Recommendations:     Discharge Recommendations: High Intensity Therapy  Discharge Equipment Recommendations: walker, rolling  Barriers to discharge:  decrease left LE function, poor quad strength, high fall risk, decrease activity tolerance, increase assist with mobility    Assessment:     Jos Espino is a 68 y.o. male admitted with a medical diagnosis of Nontraumatic psoas hematoma.  He presents with the following impairments/functional limitations: weakness, impaired endurance, impaired self care skills, impaired functional mobility, gait instability, impaired balance, decreased coordination, decreased ROM, impaired cardiopulmonary response to activity, decreased safety awareness.    Pt found in bed with HOB elevated. Pt agreeable to visit. Pt required self LE hooking with right  LE to assist with bed mobility. Pt continue to present with poor quad contraction/strength but all other muscle group improving. Pt with improved gait distance and RW management but safety with ambulation still limited due to impaired quad strength greatest with LLE terminal knee extension. He baseline in with no AD but is very reliant on RW and UE support at this time due to poor quad strength increasing risk for falls.     Rehab Prognosis: Fair; patient would benefit from acute skilled PT services to address these deficits and reach maximum level of function.    Recent Surgery: Procedure(s) (LRB):  INCISION AND DRAINAGE, HEMATOMA (Left) 6 Days Post-Op    Plan:     During this hospitalization, patient to be seen daily to address the identified rehab impairments via gait training, therapeutic activities, therapeutic exercises, neuromuscular re-education and progress toward the following goals:    Plan of Care Expires:  12/07/23    Subjective     Chief Complaint: does not want to burden his wfe  Patient/Family Comments/goals: go to rehab  Pain/Comfort:  Pain  Rating 1: 210 (post ambulation)  Location - Side 1: Left  Location 1: leg  Pain Addressed 1: Reposition, Distraction, Cessation of Activity      Objective:     Communicated with RN prior to session.  Patient found HOB elevated with peripheral IV, telemetry upon PT entry to room.     General Precautions: Standard, fall  Orthopedic Precautions: N/A  Braces: N/A  Respiratory Status: Room air     Functional Mobility:  Bed Mobility:     Supine to Sit: supervision  Sit to Supine: supervision  Transfers:     Sit to Stand:  contact guard assistance with rolling walker  Gait: 90 ft x 3 with RW and min A due to poor terminal knee extension and increase risk for LLE knee buckling      AM-PAC 6 CLICK MOBILITY  Turning over in bed (including adjusting bedclothes, sheets and blankets)?: 3  Sitting down on and standing up from a chair with arms (e.g., wheelchair, bedside commode, etc.): 3  Moving from lying on back to sitting on the side of the bed?: 3  Moving to and from a bed to a chair (including a wheelchair)?: 3  Need to walk in hospital room?: 3  Climbing 3-5 steps with a railing?: 2  Basic Mobility Total Score: 17       Treatment & Education:  Pt educated on POC, discharge recommendation, need for assist with mobility, importance of time OOB, optimal gait pattern, RW management, quad activation, use of call bell to seek assistance as needed and fall prevention      Patient left HOB elevated with all lines intact and call button in reach..    GOALS:   Multidisciplinary Problems       Physical Therapy Goals          Problem: Physical Therapy    Goal Priority Disciplines Outcome Goal Variances Interventions   Physical Therapy Goal     PT, PT/OT Ongoing, Progressing     Description: Goals to be met by: 23     Patient will increase functional independence with mobility by performin. Supine to sit with Supervision  2. Sit to stand transfer with Supervision  3. Bed to chair transfer with Supervision using Rolling  Walker  4. Gait  x 150 feet with Supervision using Rolling Walker.                              Time Tracking:     PT Received On: 11/07/23  PT Start Time: 0922     PT Stop Time: 0935  PT Total Time (min): 13 min     Billable Minutes: Gait Training 13    Treatment Type: Treatment  PT/PTA: PT     Number of PTA visits since last PT visit: 0     11/07/2023

## 2023-11-07 NOTE — ASSESSMENT & PLAN NOTE
Doing well  Remove drain today  Will need sutures removed next week  Ok to dc when clear with medical team

## 2023-11-07 NOTE — PROGRESS NOTES
[facility]  General Surgery  Progress Note    Subjective:     History of Present Illness:  69 y/o male presents with left-sided abdominal pain radiating down to his leg.  He does note some left thigh and knee numbness as well.  He notes this has been present starting over about 24-48 hrs.  He denies any traumatic events and does not take any blood thinners.  He does report he has been exercising more and specifically doing leg presses.      Post-Op Info:  Procedure(s) (LRB):  INCISION AND DRAINAGE, HEMATOMA (Left)   6 Days Post-Op     Interval History: moving leg much better  Feels better  Pain controlled    Medications:  Continuous Infusions:   sodium chloride 0.9% 125 mL/hr at 11/07/23 0417     Scheduled Meds:   docusate sodium  100 mg Oral Daily    fentaNYL  1 patch Transdermal Q72H    piperacillin-tazobactam (Zosyn) IV (PEDS and ADULTS) (extended infusion is not appropriate)  3.375 g Intravenous Q8H     PRN Meds:0.9%  NaCl infusion (for blood administration), acetaminophen, hydrALAZINE, HYDROcodone-acetaminophen, melatonin, morphine, ondansetron, sodium chloride 0.9%     Review of patient's allergies indicates:  No Known Allergies  Objective:     Vital Signs (Most Recent):  Temp: 98.5 °F (36.9 °C) (11/07/23 0510)  Pulse: 85 (11/07/23 0510)  Resp: 17 (11/07/23 0510)  BP: (!) 165/69 (11/07/23 0510)  SpO2: 99 % (11/07/23 0510) Vital Signs (24h Range):  Temp:  [98.2 °F (36.8 °C)-98.6 °F (37 °C)] 98.5 °F (36.9 °C)  Pulse:  [75-94] 85  Resp:  [16-20] 17  SpO2:  [97 %-99 %] 99 %  BP: (151-178)/(69-84) 165/69     Weight: 81.6 kg (180 lb)  Body mass index is 25.83 kg/m².    Intake/Output - Last 3 Shifts         11/05 0700 11/06 0659 11/06 0700 11/07 0659 11/07 0700 11/08 0659    P.O. 480      I.V. (mL/kg)  391.7 (4.8)     Blood       IV Piggyback 100 200     Total Intake(mL/kg) 580 (7.1) 591.7 (7.3)     Urine (mL/kg/hr) 4350 (2.2) 3675 (1.9)     Drains  0     Total Output 4350 3675     Net -6529  -3083.3            Urine Occurrence  1 x              Physical Exam  Musculoskeletal:      Comments: Swelling of thigh and left abdomen  Non tender  No obvious infections          Significant Labs:  I have reviewed all pertinent lab results within the past 24 hours.  CBC:   Recent Labs   Lab 11/07/23  0523   WBC 12.51   RBC 3.24*   HGB 8.8*   HCT 26.4*      MCV 82   MCH 27.2   MCHC 33.3     BMP:   Recent Labs   Lab 11/07/23  0523   *      K 4.7      CO2 28   BUN 12   CREATININE 1.0   CALCIUM 9.6       Significant Diagnostics:  I have reviewed all pertinent imaging results/findings within the past 24 hours.    Assessment/Plan:     * Nontraumatic psoas hematoma  Doing well  Remove drain today  Will need sutures removed next week  Ok to dc when clear with medical team        Melba Farias MD  General Surgery  [facility]

## 2023-11-07 NOTE — PROGRESS NOTES
[facility] Medicine  Progress Note    Patient Name: Jos Espino  MRN: [id number]  Patient Class: IP- Inpatient   Admission Date: 10/30/2023  Length of Stay: 6 days  Attending Physician: Marily Crawley MD  Primary Care Provider: Abiel Salguero MD        Subjective:     Principal Problem:Nontraumatic psoas hematoma        HPI:  68 year old  male presents emergency room with left pelvis/groin pain.  The patient states the pain began yesterday.  He does admit to attending exercise classes that includes exercising his legs with weights about 4 to 5 times a week at the [employer] exercise gym.  But he denied actual trauma.  The patient states his left thigh pain became excruciating with minimal range of motion so he came to emergency room for further evaluation. In the emergency room radiographic imaging revealed a Iliopsoas and left pelvic side wall intramuscular hematomas with a small amount of what is likely hemorrhage within the left retroperitoneum. General surgery was consulted and agreed to see the patient in consult.  The patient will be given pain management and monitored closely. Past medical history significant for hyperlipidemia ED and hypertension that is diet-controlled. The patient is not on anticoagulation and denies excessive NSAID use      Overview/Hospital Course:  68M with PMH HTN, HLD, and BPH s/p TURP is admitted with nontraumatic hematoma to left iliopsoas and pelvic wall. Hemoglobin monitored. Gen surg consulted. Neurology consulted as well for numbness to LLE. Taken to OR for hematoma evacuation with general surgery to inability to extend left knee. Drain in place.      Interval History:  Doing well today.  Strength improving slowly.  Had drain removed by surgery today.  Afebrile.    Review of Systems Complete ROS otherwise negative other than stated in HPI.   Objective:     Vital Signs (Most Recent):  Temp: 98.5 °F (36.9 °C) (11/07/23 1159)  Pulse: 93  "(11/07/23 1159)  Resp: 18 (11/07/23 1159)  BP: (!) 152/73 (notified nurse sepideh) (11/07/23 1159)  SpO2: 100 % (11/07/23 1159) Vital Signs (24h Range):  Temp:  [98 °F (36.7 °C)-98.6 °F (37 °C)] 98.5 °F (36.9 °C)  Pulse:  [75-94] 93  Resp:  [16-20] 18  SpO2:  [97 %-100 %] 100 %  BP: (151-178)/(69-84) 152/73     Weight: 81.6 kg (180 lb)  Body mass index is 25.83 kg/m².    Intake/Output Summary (Last 24 hours) at 11/7/2023 1433  Last data filed at 11/7/2023 0511  Gross per 24 hour   Intake 591.67 ml   Output 2575 ml   Net -1983.33 ml         Physical Exam  GENERAL:  Alert and oriented x 3  HEENT:  EOMI. Conjunctivae intact. Posterior pharynx clear, oral mucosa moist  NECK:  Supple   LUNGS:  No respiratory distress. Clear to auscultation bilaterally with good air movement  CARDIAC:  RRR without murmur, rub or gallop  ABDOMEN:  Soft,  Nontender and nondistended, no rebound or guarding, bowel sounds present   EXTREMITIES:  Peripheral pulses are 2+. Hands and feet are warm. Good capillary refill in fingers (< 2 seconds). No clubbing, cyanosis or edema.         Significant Labs: All pertinent labs within the past 24 hours have been reviewed.  BMP:   Recent Labs   Lab 11/07/23 0523   *      K 4.7      CO2 28   BUN 12   CREATININE 1.0   CALCIUM 9.6     CBC:   Recent Labs   Lab 11/06/23 0528 11/07/23 0523   WBC 11.16 12.51   HGB 8.8* 8.8*   HCT 25.6* 26.4*    371     CMP:   Recent Labs   Lab 11/06/23 0528 11/07/23 0523    136   K 4.6 4.7    105   CO2 31* 28    111*   BUN 11 12   CREATININE 1.0 1.0   CALCIUM 9.4 9.6   PROT 6.5 6.8   ALBUMIN 3.2* 3.4*   BILITOT 1.4* 1.5*   ALKPHOS 78 79   AST 81* 61*   ALT 87* 79*   ANIONGAP 2* 3*     Cardiac Markers: No results for input(s): "CKMB", "MYOGLOBIN", "BNP", "TROPISTAT" in the last 48 hours.  Magnesium: No results for input(s): "MG" in the last 48 hours.  POCT Glucose: No results for input(s): "POCTGLUCOSE" in the last 48 " "hours.  Respiratory Culture: No results for input(s): "GSRESP", "RESPIRATORYC" in the last 48 hours.  Troponin: No results for input(s): "TROPONINI", "TROPONINIHS" in the last 48 hours.  TSH: No results for input(s): "TSH" in the last 4320 hours.  Urine Culture: No results for input(s): "LABURIN" in the last 48 hours.    Significant Imaging: I have reviewed all pertinent imaging results/findings within the past 24 hours.      Assessment/Plan:      * Nontraumatic psoas hematoma  Improving.  Drain removed today by surgery.  We will follow up next week for suture removal.  Fever resolved.  We will discontinue antibiotics on discharge.  CBC daily while inpatient.  Continue PT and OT for associated left leg weakness, awaiting rehab versus SNF placement          Transaminitis/hyperbilirubinemia  Improved further today.  Likely related to hematoma.  Monitor daily         Hypercholesteremia  Hold statin for transaminitis, resume on discharge     VTE Risk Mitigation (From admission, onward)         Ordered     IP VTE LOW RISK PATIENT  Once         10/31/23 0209                Discharge Planning   MAGDALENA: 11/8/2023     Code Status: Full Code   Is the patient medically ready for discharge?:     Reason for patient still in hospital (select all that apply): Pending disposition  Discharge Plan A: Rehab   Discharge Delays: None known at this time              Marily Crawley MD  Department of Hospital Medicine   [facility]  "

## 2023-11-07 NOTE — SUBJECTIVE & OBJECTIVE
Interval History: moving leg much better  Feels better  Pain controlled    Medications:  Continuous Infusions:   sodium chloride 0.9% 125 mL/hr at 11/07/23 0417     Scheduled Meds:   docusate sodium  100 mg Oral Daily    fentaNYL  1 patch Transdermal Q72H    piperacillin-tazobactam (Zosyn) IV (PEDS and ADULTS) (extended infusion is not appropriate)  3.375 g Intravenous Q8H     PRN Meds:0.9%  NaCl infusion (for blood administration), acetaminophen, hydrALAZINE, HYDROcodone-acetaminophen, melatonin, morphine, ondansetron, sodium chloride 0.9%     Review of patient's allergies indicates:  No Known Allergies  Objective:     Vital Signs (Most Recent):  Temp: 98.5 °F (36.9 °C) (11/07/23 0510)  Pulse: 85 (11/07/23 0510)  Resp: 17 (11/07/23 0510)  BP: (!) 165/69 (11/07/23 0510)  SpO2: 99 % (11/07/23 0510) Vital Signs (24h Range):  Temp:  [98.2 °F (36.8 °C)-98.6 °F (37 °C)] 98.5 °F (36.9 °C)  Pulse:  [75-94] 85  Resp:  [16-20] 17  SpO2:  [97 %-99 %] 99 %  BP: (151-178)/(69-84) 165/69     Weight: 81.6 kg (180 lb)  Body mass index is 25.83 kg/m².    Intake/Output - Last 3 Shifts         11/05 0700 11/06 0659 11/06 0700 11/07 0659 11/07 0700 11/08 0659    P.O. 480      I.V. (mL/kg)  391.7 (4.8)     Blood       IV Piggyback 100 200     Total Intake(mL/kg) 580 (7.1) 591.7 (7.3)     Urine (mL/kg/hr) 4350 (2.2) 3675 (1.9)     Drains  0     Total Output 4350 3675     Net -3770 -3083.3            Urine Occurrence  1 x              Physical Exam  Musculoskeletal:      Comments: Swelling of thigh and left abdomen  Non tender  No obvious infections          Significant Labs:  I have reviewed all pertinent lab results within the past 24 hours.  CBC:   Recent Labs   Lab 11/07/23  0523   WBC 12.51   RBC 3.24*   HGB 8.8*   HCT 26.4*      MCV 82   MCH 27.2   MCHC 33.3     BMP:   Recent Labs   Lab 11/07/23 0523   *      K 4.7      CO2 28   BUN 12   CREATININE 1.0   CALCIUM 9.6       Significant Diagnostics:  I  have reviewed all pertinent imaging results/findings within the past 24 hours.

## 2023-11-08 LAB
ALBUMIN SERPL BCP-MCNC: 3.6 G/DL (ref 3.5–5.2)
ALP SERPL-CCNC: 80 U/L (ref 55–135)
ALT SERPL W/O P-5'-P-CCNC: 76 U/L (ref 10–44)
ANION GAP SERPL CALC-SCNC: 4 MMOL/L (ref 8–16)
AST SERPL-CCNC: 56 U/L (ref 10–40)
BACTERIA BLD CULT: NORMAL
BASOPHILS # BLD AUTO: 0.12 K/UL (ref 0–0.2)
BASOPHILS NFR BLD: 0.9 % (ref 0–1.9)
BILIRUB SERPL-MCNC: 1.2 MG/DL (ref 0.1–1)
BUN SERPL-MCNC: 14 MG/DL (ref 8–23)
CALCIUM SERPL-MCNC: 10.1 MG/DL (ref 8.7–10.5)
CHLORIDE SERPL-SCNC: 105 MMOL/L (ref 95–110)
CO2 SERPL-SCNC: 28 MMOL/L (ref 23–29)
CREAT SERPL-MCNC: 1.1 MG/DL (ref 0.5–1.4)
DIFFERENTIAL METHOD: ABNORMAL
EOSINOPHIL # BLD AUTO: 0.7 K/UL (ref 0–0.5)
EOSINOPHIL NFR BLD: 5.2 % (ref 0–8)
ERYTHROCYTE [DISTWIDTH] IN BLOOD BY AUTOMATED COUNT: 14.4 % (ref 11.5–14.5)
EST. GFR  (NO RACE VARIABLE): >60 ML/MIN/1.73 M^2
GLUCOSE SERPL-MCNC: 118 MG/DL (ref 70–110)
HCT VFR BLD AUTO: 28.5 % (ref 40–54)
HGB BLD-MCNC: 9.4 G/DL (ref 14–18)
IMM GRANULOCYTES # BLD AUTO: 0.25 K/UL (ref 0–0.04)
IMM GRANULOCYTES NFR BLD AUTO: 2 % (ref 0–0.5)
LYMPHOCYTES # BLD AUTO: 1.9 K/UL (ref 1–4.8)
LYMPHOCYTES NFR BLD: 14.6 % (ref 18–48)
MCH RBC QN AUTO: 26.9 PG (ref 27–31)
MCHC RBC AUTO-ENTMCNC: 33 G/DL (ref 32–36)
MCV RBC AUTO: 82 FL (ref 82–98)
MONOCYTES # BLD AUTO: 2 K/UL (ref 0.3–1)
MONOCYTES NFR BLD: 15.3 % (ref 4–15)
NEUTROPHILS # BLD AUTO: 7.9 K/UL (ref 1.8–7.7)
NEUTROPHILS NFR BLD: 62 % (ref 38–73)
NRBC BLD-RTO: 0 /100 WBC
PLATELET # BLD AUTO: 451 K/UL (ref 150–450)
PMV BLD AUTO: 11.2 FL (ref 9.2–12.9)
POTASSIUM SERPL-SCNC: 5.7 MMOL/L (ref 3.5–5.1)
PROT SERPL-MCNC: 7.4 G/DL (ref 6–8.4)
RBC # BLD AUTO: 3.49 M/UL (ref 4.6–6.2)
SODIUM SERPL-SCNC: 137 MMOL/L (ref 136–145)
WBC # BLD AUTO: 12.78 K/UL (ref 3.9–12.7)

## 2023-11-08 PROCEDURE — 36415 COLL VENOUS BLD VENIPUNCTURE: CPT | Performed by: NURSE PRACTITIONER

## 2023-11-08 PROCEDURE — 97116 GAIT TRAINING THERAPY: CPT

## 2023-11-08 PROCEDURE — 25000003 PHARM REV CODE 250: Performed by: SURGERY

## 2023-11-08 PROCEDURE — 97535 SELF CARE MNGMENT TRAINING: CPT

## 2023-11-08 PROCEDURE — 12000002 HC ACUTE/MED SURGE SEMI-PRIVATE ROOM

## 2023-11-08 PROCEDURE — 63600175 PHARM REV CODE 636 W HCPCS: Performed by: SURGERY

## 2023-11-08 PROCEDURE — 80053 COMPREHEN METABOLIC PANEL: CPT | Performed by: NURSE PRACTITIONER

## 2023-11-08 PROCEDURE — 25000003 PHARM REV CODE 250: Performed by: NURSE PRACTITIONER

## 2023-11-08 PROCEDURE — 25000003 PHARM REV CODE 250: Performed by: HOSPITALIST

## 2023-11-08 PROCEDURE — 85025 COMPLETE CBC W/AUTO DIFF WBC: CPT | Performed by: NURSE PRACTITIONER

## 2023-11-08 RX ADMIN — Medication 6 MG: at 10:11

## 2023-11-08 RX ADMIN — MORPHINE SULFATE 4 MG: 4 INJECTION, SOLUTION INTRAMUSCULAR; INTRAVENOUS at 01:11

## 2023-11-08 RX ADMIN — HYDROCODONE BITARTRATE AND ACETAMINOPHEN 2 TABLET: 10; 325 TABLET ORAL at 04:11

## 2023-11-08 RX ADMIN — PIPERACILLIN SODIUM AND TAZOBACTAM SODIUM 3.38 G: 3; .375 INJECTION, POWDER, LYOPHILIZED, FOR SOLUTION INTRAVENOUS at 04:11

## 2023-11-08 RX ADMIN — DOCUSATE SODIUM 100 MG: 100 CAPSULE, LIQUID FILLED ORAL at 08:11

## 2023-11-08 RX ADMIN — MORPHINE SULFATE 4 MG: 4 INJECTION, SOLUTION INTRAMUSCULAR; INTRAVENOUS at 10:11

## 2023-11-08 RX ADMIN — PIPERACILLIN SODIUM AND TAZOBACTAM SODIUM 3.38 G: 3; .375 INJECTION, POWDER, LYOPHILIZED, FOR SOLUTION INTRAVENOUS at 11:11

## 2023-11-08 RX ADMIN — PIPERACILLIN SODIUM AND TAZOBACTAM SODIUM 3.38 G: 3; .375 INJECTION, POWDER, LYOPHILIZED, FOR SOLUTION INTRAVENOUS at 08:11

## 2023-11-08 RX ADMIN — HYDROCODONE BITARTRATE AND ACETAMINOPHEN 2 TABLET: 10; 325 TABLET ORAL at 08:11

## 2023-11-08 RX ADMIN — HYDRALAZINE HYDROCHLORIDE 50 MG: 25 TABLET ORAL at 12:11

## 2023-11-08 NOTE — PT/OT/SLP PROGRESS
Physical Therapy Treatment    Patient Name:  Jos Espino   MRN:  [id number]    Recommendations:     Discharge Recommendations: High Intensity Therapy  Discharge Equipment Recommendations: walker, rolling  Barriers to discharge:  decrease left LE function, poor quad strength, high fall risk, decrease activity tolerance, increase assist with mobility    Assessment:     Jos Espino is a 68 y.o. male admitted with a medical diagnosis of Nontraumatic psoas hematoma.  He presents with the following impairments/functional limitations: weakness, impaired endurance, impaired self care skills, impaired functional mobility, gait instability, impaired balance, decreased coordination, decreased ROM, impaired cardiopulmonary response to activity, decreased safety awareness.    Pt found in bed with HOB elevated. Pt agreeable to visit. Pt required self LE hooking with right  LE to assist with bed mobility. Pt continue to present with poor quad contraction/strength against gravity but all other muscle group progressing. Pt with improved gait distance and RW management but safety with ambulation still limited due to impaired quad strength greatest with LLE terminal knee extension. With increase ambulation pt reports increase posterior knee discomfort as patient relies on locking out  knee to decrease knee buckling. He baseline in with no AD but is very reliant on RW and UE support at this time due to poor quad strength increasing risk for falls.    Rehab Prognosis: Fair; patient would benefit from acute skilled PT services to address these deficits and reach maximum level of function.    Recent Surgery: Procedure(s) (LRB):  INCISION AND DRAINAGE, HEMATOMA (Left) 7 Days Post-Op    Plan:     During this hospitalization, patient to be seen daily to address the identified rehab impairments via gait training, therapeutic activities, therapeutic exercises, neuromuscular re-education and progress toward the following goals:    Plan of Care  Expires:  23    Subjective     Chief Complaint: does not want to burden his wife  Patient/Family Comments/goals: go to rehab  Pain/Comfort:  Pain Rating 1: 0/10      Objective:     Communicated with RN prior to session.  Patient found HOB elevated with peripheral IV, telemetry upon PT entry to room.     General Precautions: Standard, fall  Orthopedic Precautions: N/A  Braces: N/A  Respiratory Status: Room air     Functional Mobility:  Bed Mobility:     Supine to Sit: supervision  Sit to Supine: supervision  Transfers:     Sit to Stand:  contact guard assistance with rolling walker  Gait: 90 ft x 4 with RW and min A due to poor terminal knee extension and increase risk for LLE knee buckling, increase posterior knee discomfort with increase ambulation      AM-PAC 6 CLICK MOBILITY          Treatment & Education:  Pt educated on POC, discharge recommendation, need for assist with mobility, importance of time OOB, optimal gait pattern, RW management, quad activation, use of call bell to seek assistance as needed and fall prevention    Patient left up in chair with all lines intact and call button in reach..    GOALS:   Multidisciplinary Problems       Physical Therapy Goals          Problem: Physical Therapy    Goal Priority Disciplines Outcome Goal Variances Interventions   Physical Therapy Goal     PT, PT/OT Ongoing, Progressing     Description: Goals to be met by: 23     Patient will increase functional independence with mobility by performin. Supine to sit with Supervision  2. Sit to stand transfer with Supervision  3. Bed to chair transfer with Supervision using Rolling Walker  4. Gait  x 150 feet with Supervision using Rolling Walker.                              Time Tracking:     PT Received On: 23  PT Start Time: 920     PT Stop Time: 935  PT Total Time (min): 15 min     Billable Minutes: Gait Training 15    Treatment Type: Treatment  PT/PTA: PT     Number of PTA visits since last PT  visit: 0     11/08/2023

## 2023-11-08 NOTE — PROGRESS NOTES
[facility] Medicine  Progress Note    Patient Name: Jos Espino  MRN: [id number]  Patient Class: IP- Inpatient   Admission Date: 10/30/2023  Length of Stay: 7 days  Attending Physician: Eladio Carter MD  Primary Care Provider: Abiel Salguero MD        Subjective:     Principal Problem:Nontraumatic psoas hematoma        HPI:  68 year old  male presents emergency room with left pelvis/groin pain.  The patient states the pain began yesterday.  He does admit to attending exercise classes that includes exercising his legs with weights about 4 to 5 times a week at the [employer] exercise [employer].  But he denied actual trauma.  The patient states his left thigh pain became excruciating with minimal range of motion so he came to emergency room for further evaluation. In the emergency room radiographic imaging revealed a Iliopsoas and left pelvic side wall intramuscular hematomas with a small amount of what is likely hemorrhage within the left retroperitoneum. General surgery was consulted and agreed to see the patient in consult.  The patient will be given pain management and monitored closely. Past medical history significant for hyperlipidemia ED and hypertension that is diet-controlled. The patient is not on anticoagulation and denies excessive NSAID use      Overview/Hospital Course:  68M with PMH HTN, HLD, and BPH s/p TURP was admitted with nontraumatic hematoma to left iliopsoas and pelvic wall. Hemoglobin was monitored and down trended and he was transfused 1 unit PRBC.  His hemoglobin has been stable since.  Gen surg consulted and he underwent hematoma evacuation with drain placement.  Drain output was monitored and slowly improved.  His drain was removed on 11/06. Neurology was also consulted for numbness and weakness to LLE.  This improved with drainage of the hematoma.  He had a couple fever spikes and has been on empiric Zosyn.  Fever has resolved.  He has been  following with PT and OT.  His strength is slowly improving but not back to baseline.  He is awaiting placement in rehab for further therapy.      Interval History: No acute events overnight, he is continuing to have soome pain in his left lower quadrant. He is having difficulty with ambulation due to the pain, but would benefit from rehab placement    Review of Systems   All other systems reviewed and are negative.    Objective:     Vital Signs (Most Recent):  Temp: 98.5 °F (36.9 °C) (11/08/23 0430)  Pulse: 87 (11/08/23 0430)  Resp: 20 (11/08/23 0808)  BP: (!) 159/79 (11/08/23 0430)  SpO2: 98 % (11/08/23 0430) Vital Signs (24h Range):  Temp:  [98.4 °F (36.9 °C)-98.9 °F (37.2 °C)] 98.5 °F (36.9 °C)  Pulse:  [85-93] 87  Resp:  [16-20] 20  SpO2:  [96 %-100 %] 98 %  BP: (152-171)/(73-81) 159/79     Weight: 81.6 kg (180 lb)  Body mass index is 25.83 kg/m².    Intake/Output Summary (Last 24 hours) at 11/8/2023 1156  Last data filed at 11/8/2023 0552  Gross per 24 hour   Intake 100 ml   Output 4525 ml   Net -4425 ml         Physical Exam  Vitals reviewed.   Constitutional:       Appearance: Normal appearance.   HENT:      Head: Normocephalic and atraumatic.      Nose: Nose normal.      Mouth/Throat:      Mouth: Mucous membranes are moist.   Eyes:      Pupils: Pupils are equal, round, and reactive to light.   Cardiovascular:      Rate and Rhythm: Normal rate and regular rhythm.      Pulses: Normal pulses.      Heart sounds: Normal heart sounds. No murmur heard.     No friction rub. No gallop.   Pulmonary:      Effort: Pulmonary effort is normal. No respiratory distress.      Breath sounds: Normal breath sounds.   Abdominal:      General: Abdomen is flat. Bowel sounds are normal. There is no distension.      Palpations: Abdomen is soft.      Tenderness: There is no abdominal tenderness.      Comments: LLQ with dressing c/d/I, mild ttp immediately around the incision site but otherwise the area is soft and nontender    Musculoskeletal:         General: No swelling. Normal range of motion.      Cervical back: Normal range of motion and neck supple.   Skin:     General: Skin is warm and dry.   Neurological:      General: No focal deficit present.      Mental Status: He is alert and oriented to person, place, and time.   Psychiatric:         Mood and Affect: Mood normal.         Behavior: Behavior normal.         Thought Content: Thought content normal.         Judgment: Judgment normal.             Significant Labs: All pertinent labs within the past 24 hours have been reviewed.    Significant Imaging: I have reviewed all pertinent imaging results/findings within the past 24 hours.      Assessment/Plan:      * Nontraumatic psoas hematoma  CT reviewed  Now s/p hematoma evacuation 11/1 with gen surg  - trend hgb  - pain med prn  - drain has been removed.   - he will need stitches taken out in 1 week.   - planned follow up on 11/14.   - discharge planning for inpatient rehab prior to returning home  - d/c abx upon discharge    Enlarged prostate with lower urinary tract symptoms (LUTS)        Hypercholesteremia  Chronic, continue statin      VTE Risk Mitigation (From admission, onward)         Ordered     IP VTE LOW RISK PATIENT  Once         10/31/23 0209                Discharge Planning   MAGDALENA: 11/9/2023     Code Status: Full Code   Is the patient medically ready for discharge?:     Reason for patient still in hospital (select all that apply): Treatment  Discharge Plan A: Rehab   Discharge Delays: None known at this time              Eladio Carter MD  Department of Hospital Medicine   [facility]

## 2023-11-08 NOTE — PROGRESS NOTES
[facility]  General Surgery  Progress Note    Subjective:     History of Present Illness:  69 y/o male presents with left-sided abdominal pain radiating down to his leg.  He does note some left thigh and knee numbness as well.  He notes this has been present starting over about 24-48 hrs.  He denies any traumatic events and does not take any blood thinners.  He does report he has been exercising more and specifically doing leg presses.      Post-Op Info:  Procedure(s) (LRB):  INCISION AND DRAINAGE, HEMATOMA (Left)   7 Days Post-Op     Interval History: doing well  Regained full function of leg    Medications:  Continuous Infusions:   sodium chloride 0.9% 125 mL/hr at 11/07/23 2342     Scheduled Meds:   docusate sodium  100 mg Oral Daily    piperacillin-tazobactam (Zosyn) IV (PEDS and ADULTS) (extended infusion is not appropriate)  3.375 g Intravenous Q8H     PRN Meds:0.9%  NaCl infusion (for blood administration), acetaminophen, hydrALAZINE, HYDROcodone-acetaminophen, melatonin, morphine, ondansetron, sodium chloride 0.9%     Review of patient's allergies indicates:  No Known Allergies  Objective:     Vital Signs (Most Recent):  Temp: 98.5 °F (36.9 °C) (11/08/23 0430)  Pulse: 87 (11/08/23 0430)  Resp: 20 (11/08/23 0808)  BP: (!) 159/79 (11/08/23 0430)  SpO2: 98 % (11/08/23 0430) Vital Signs (24h Range):  Temp:  [98.4 °F (36.9 °C)-98.9 °F (37.2 °C)] 98.5 °F (36.9 °C)  Pulse:  [85-93] 87  Resp:  [16-20] 20  SpO2:  [96 %-100 %] 98 %  BP: (152-171)/(73-81) 159/79     Weight: 81.6 kg (180 lb)  Body mass index is 25.83 kg/m².    Intake/Output - Last 3 Shifts         11/06 0700 11/07 0659 11/07 0700 11/08 0659 11/08 0700 11/09 0659    P.O.       I.V. (mL/kg) 391.7 (4.8)      IV Piggyback 200 100     Total Intake(mL/kg) 591.7 (7.3) 100 (1.2)     Urine (mL/kg/hr) 3675 (1.9) 4525 (2.3)     Drains 0      Total Output 3675 4525     Net -3083.3 -4425            Urine Occurrence 1 x 2 x              Physical  Exam  Musculoskeletal:         General: Swelling present.          Significant Labs:  I have reviewed all pertinent lab results within the past 24 hours.  CBC:   Recent Labs   Lab 11/08/23  0521   WBC 12.78*   RBC 3.49*   HGB 9.4*   HCT 28.5*   *   MCV 82   MCH 26.9*   MCHC 33.0     BMP:   Recent Labs   Lab 11/08/23  0521   *      K 5.7*      CO2 28   BUN 14   CREATININE 1.1   CALCIUM 10.1       Significant Diagnostics:  I have reviewed all pertinent imaging results/findings within the past 24 hours.    Assessment/Plan:     * Nontraumatic psoas hematoma  Ok to dc when clear with medical team  Need to see next week to take out sutures        Melba Farias MD  General Surgery  [facility]

## 2023-11-08 NOTE — PLAN OF CARE
Peer to peer review scheduled with Dr. Carter and Gilberto El for today at 3:30pm per specialist Minerva.        11/08/23 1518   Post-Acute Status   Post-Acute Authorization Placement   Post-Acute Placement Status Pending payor medical review/second level review

## 2023-11-08 NOTE — ASSESSMENT & PLAN NOTE
CT reviewed  Now s/p hematoma evacuation 11/1 with gen surg  - trend hgb  - pain med prn  - drain has been removed.   - he will need stitches taken out in 1 week.   - planned follow up on 11/14.   - discharge planning for inpatient rehab prior to returning home  - d/c abx upon discharge

## 2023-11-08 NOTE — SUBJECTIVE & OBJECTIVE
Interval History: No acute events overnight, he is continuing to have soome pain in his left lower quadrant. He is having difficulty with ambulation due to the pain, but would benefit from rehab placement    Review of Systems   All other systems reviewed and are negative.    Objective:     Vital Signs (Most Recent):  Temp: 98.5 °F (36.9 °C) (11/08/23 0430)  Pulse: 87 (11/08/23 0430)  Resp: 20 (11/08/23 0808)  BP: (!) 159/79 (11/08/23 0430)  SpO2: 98 % (11/08/23 0430) Vital Signs (24h Range):  Temp:  [98.4 °F (36.9 °C)-98.9 °F (37.2 °C)] 98.5 °F (36.9 °C)  Pulse:  [85-93] 87  Resp:  [16-20] 20  SpO2:  [96 %-100 %] 98 %  BP: (152-171)/(73-81) 159/79     Weight: 81.6 kg (180 lb)  Body mass index is 25.83 kg/m².    Intake/Output Summary (Last 24 hours) at 11/8/2023 1156  Last data filed at 11/8/2023 0552  Gross per 24 hour   Intake 100 ml   Output 4525 ml   Net -4425 ml         Physical Exam  Vitals reviewed.   Constitutional:       Appearance: Normal appearance.   HENT:      Head: Normocephalic and atraumatic.      Nose: Nose normal.      Mouth/Throat:      Mouth: Mucous membranes are moist.   Eyes:      Pupils: Pupils are equal, round, and reactive to light.   Cardiovascular:      Rate and Rhythm: Normal rate and regular rhythm.      Pulses: Normal pulses.      Heart sounds: Normal heart sounds. No murmur heard.     No friction rub. No gallop.   Pulmonary:      Effort: Pulmonary effort is normal. No respiratory distress.      Breath sounds: Normal breath sounds.   Abdominal:      General: Abdomen is flat. Bowel sounds are normal. There is no distension.      Palpations: Abdomen is soft.      Tenderness: There is no abdominal tenderness.      Comments: LLQ with dressing c/d/I, mild ttp immediately around the incision site but otherwise the area is soft and nontender   Musculoskeletal:         General: No swelling. Normal range of motion.      Cervical back: Normal range of motion and neck supple.   Skin:     General:  Skin is warm and dry.   Neurological:      General: No focal deficit present.      Mental Status: He is alert and oriented to person, place, and time.   Psychiatric:         Mood and Affect: Mood normal.         Behavior: Behavior normal.         Thought Content: Thought content normal.         Judgment: Judgment normal.             Significant Labs: All pertinent labs within the past 24 hours have been reviewed.    Significant Imaging: I have reviewed all pertinent imaging results/findings within the past 24 hours.

## 2023-11-08 NOTE — PLAN OF CARE
Per Ericka ([facility]), [facility] is offering a P2P to be scheduled by 10:00 AM Central tomorrow Thursday 11/9/2023. PH: [phone] ASK FOR ESSIE. Case Ref#: [id number]        11/08/23 1119   Post-Acute Status   Post-Acute Authorization Placement   Post-Acute Placement Status Pending payor medical review/second level review   Discharge Delays None known at this time   Discharge Plan   Discharge Plan A Rehab   Discharge Plan B Skilled Nursing Facility

## 2023-11-09 VITALS
WEIGHT: 180 LBS | SYSTOLIC BLOOD PRESSURE: 141 MMHG | OXYGEN SATURATION: 99 % | HEIGHT: 70 IN | HEART RATE: 74 BPM | TEMPERATURE: 98 F | RESPIRATION RATE: 16 BRPM | DIASTOLIC BLOOD PRESSURE: 74 MMHG | BODY MASS INDEX: 25.77 KG/M2

## 2023-11-09 LAB
ALBUMIN SERPL BCP-MCNC: 3.5 G/DL (ref 3.5–5.2)
ALP SERPL-CCNC: 76 U/L (ref 55–135)
ALT SERPL W/O P-5'-P-CCNC: 73 U/L (ref 10–44)
ANION GAP SERPL CALC-SCNC: 6 MMOL/L (ref 8–16)
AST SERPL-CCNC: 55 U/L (ref 10–40)
BASOPHILS # BLD AUTO: 0.13 K/UL (ref 0–0.2)
BASOPHILS NFR BLD: 1 % (ref 0–1.9)
BILIRUB SERPL-MCNC: 1.1 MG/DL (ref 0.1–1)
BUN SERPL-MCNC: 15 MG/DL (ref 8–23)
CALCIUM SERPL-MCNC: 9.9 MG/DL (ref 8.7–10.5)
CHLORIDE SERPL-SCNC: 104 MMOL/L (ref 95–110)
CO2 SERPL-SCNC: 26 MMOL/L (ref 23–29)
CREAT SERPL-MCNC: 1 MG/DL (ref 0.5–1.4)
DIFFERENTIAL METHOD: ABNORMAL
EOSINOPHIL # BLD AUTO: 0.6 K/UL (ref 0–0.5)
EOSINOPHIL NFR BLD: 4.5 % (ref 0–8)
ERYTHROCYTE [DISTWIDTH] IN BLOOD BY AUTOMATED COUNT: 14.5 % (ref 11.5–14.5)
EST. GFR  (NO RACE VARIABLE): >60 ML/MIN/1.73 M^2
GLUCOSE SERPL-MCNC: 111 MG/DL (ref 70–110)
HCT VFR BLD AUTO: 26.6 % (ref 40–54)
HGB BLD-MCNC: 8.8 G/DL (ref 14–18)
IMM GRANULOCYTES # BLD AUTO: 0.26 K/UL (ref 0–0.04)
IMM GRANULOCYTES NFR BLD AUTO: 2.1 % (ref 0–0.5)
LYMPHOCYTES # BLD AUTO: 2.1 K/UL (ref 1–4.8)
LYMPHOCYTES NFR BLD: 16.7 % (ref 18–48)
MCH RBC QN AUTO: 27.2 PG (ref 27–31)
MCHC RBC AUTO-ENTMCNC: 33.1 G/DL (ref 32–36)
MCV RBC AUTO: 82 FL (ref 82–98)
MONOCYTES # BLD AUTO: 1.9 K/UL (ref 0.3–1)
MONOCYTES NFR BLD: 14.8 % (ref 4–15)
NEUTROPHILS # BLD AUTO: 7.7 K/UL (ref 1.8–7.7)
NEUTROPHILS NFR BLD: 60.9 % (ref 38–73)
NRBC BLD-RTO: 0 /100 WBC
PLATELET # BLD AUTO: 458 K/UL (ref 150–450)
PMV BLD AUTO: 11.2 FL (ref 9.2–12.9)
POTASSIUM SERPL-SCNC: 5.1 MMOL/L (ref 3.5–5.1)
PROT SERPL-MCNC: 7.2 G/DL (ref 6–8.4)
RBC # BLD AUTO: 3.24 M/UL (ref 4.6–6.2)
SODIUM SERPL-SCNC: 136 MMOL/L (ref 136–145)
WBC # BLD AUTO: 12.6 K/UL (ref 3.9–12.7)

## 2023-11-09 PROCEDURE — 25000003 PHARM REV CODE 250: Performed by: HOSPITALIST

## 2023-11-09 PROCEDURE — 97116 GAIT TRAINING THERAPY: CPT

## 2023-11-09 PROCEDURE — 63600175 PHARM REV CODE 636 W HCPCS: Performed by: SURGERY

## 2023-11-09 PROCEDURE — 97110 THERAPEUTIC EXERCISES: CPT

## 2023-11-09 PROCEDURE — 86580 TB INTRADERMAL TEST: CPT | Performed by: INTERNAL MEDICINE

## 2023-11-09 PROCEDURE — 80053 COMPREHEN METABOLIC PANEL: CPT | Performed by: NURSE PRACTITIONER

## 2023-11-09 PROCEDURE — 85025 COMPLETE CBC W/AUTO DIFF WBC: CPT | Performed by: NURSE PRACTITIONER

## 2023-11-09 PROCEDURE — 30200315 PPD INTRADERMAL TEST REV CODE 302: Performed by: INTERNAL MEDICINE

## 2023-11-09 PROCEDURE — 36415 COLL VENOUS BLD VENIPUNCTURE: CPT | Performed by: NURSE PRACTITIONER

## 2023-11-09 PROCEDURE — 25000003 PHARM REV CODE 250: Performed by: SURGERY

## 2023-11-09 RX ORDER — HYDROCODONE BITARTRATE AND ACETAMINOPHEN 10; 325 MG/1; MG/1
2 TABLET ORAL EVERY 6 HOURS PRN
Qty: 10 TABLET | Refills: 0 | Status: SHIPPED | OUTPATIENT
Start: 2023-11-09 | End: 2023-11-28

## 2023-11-09 RX ADMIN — PIPERACILLIN SODIUM AND TAZOBACTAM SODIUM 3.38 G: 3; .375 INJECTION, POWDER, LYOPHILIZED, FOR SOLUTION INTRAVENOUS at 08:11

## 2023-11-09 RX ADMIN — SODIUM CHLORIDE: 0.9 INJECTION, SOLUTION INTRAVENOUS at 09:11

## 2023-11-09 RX ADMIN — HYDROCODONE BITARTRATE AND ACETAMINOPHEN 2 TABLET: 10; 325 TABLET ORAL at 02:11

## 2023-11-09 RX ADMIN — PIPERACILLIN SODIUM AND TAZOBACTAM SODIUM 3.38 G: 3; .375 INJECTION, POWDER, LYOPHILIZED, FOR SOLUTION INTRAVENOUS at 03:11

## 2023-11-09 RX ADMIN — TUBERCULIN PURIFIED PROTEIN DERIVATIVE 5 UNITS: 5 INJECTION, SOLUTION INTRADERMAL at 02:11

## 2023-11-09 RX ADMIN — DOCUSATE SODIUM 100 MG: 100 CAPSULE, LIQUID FILLED ORAL at 08:11

## 2023-11-09 RX ADMIN — HYDROCODONE BITARTRATE AND ACETAMINOPHEN 2 TABLET: 10; 325 TABLET ORAL at 03:11

## 2023-11-09 NOTE — PT/OT/SLP PROGRESS
Occupational Therapy   Treatment    Name: Jos Espino  MRN: [id number]  Admitting Diagnosis:  Nontraumatic psoas hematoma  8 Days Post-Op    Recommendations:     Discharge Recommendations: High Intensity Therapy  Discharge Equipment Recommendations:  walker, rolling  Barriers to discharge:  Decreased caregiver support    Assessment:     Jos Espino is a 68 y.o. male with a medical diagnosis of Nontraumatic psoas hematoma.   Performance deficits affecting function are weakness, impaired endurance, impaired self care skills, impaired functional mobility, gait instability, impaired balance, pain, decreased lower extremity function.     Rehab Prognosis:  Good; patient would benefit from acute skilled OT services to address these deficits and reach maximum level of function.       Plan:     Patient to be seen 5 x/week to address the above listed problems via self-care/home management, therapeutic activities, therapeutic exercises  Plan of Care Expires: 12/02/23  Plan of Care Reviewed with: patient    Subjective     Pain/Comfort:  Pain Rating 1:  (not rated)  Pain Rating Post-Intervention 1:  (not rated)    Objective:     Communicated with: nurse prior to session.  Patient found supine with telemetry upon OT entry to room.    General Precautions: Standard, fall    Orthopedic Precautions:N/A  Braces: N/A  Respiratory Status: Room air     Occupational Performance:     Bed Mobility:    Patient completed Supine to Sit with modified independence  Patient completed Sit to Supine with modified independence     Functional Mobility/Transfers:  Patient completed Sit <> Stand Transfer with contact guard assistance  with  rolling walker     Activities of Daily Living:  Lower Body Dressing: stand by assistance seated EOB to don shoes     Treatment & Education:  Pt completed ten repetitions of UE therex in all major joints/planes using 3# dumbbells; pt completed some exercises in sitting with SBA and some in standing with CGA and  unilateral UE support on RW.      Patient left HOB elevated with all lines intact, call button in reach    GOALS:   Multidisciplinary Problems       Occupational Therapy Goals          Problem: Occupational Therapy    Goal Priority Disciplines Outcome Interventions   Occupational Therapy Goal     OT, PT/OT Ongoing, Progressing    Description: Goals to be met by: 12/2/23     Patient will increase functional independence with ADLs by performing:    LE Dressing with Modified [place] using AD as needed.  Grooming while standing at sink with Modified [place].  Toileting from toilet with Modified [place] for hygiene and clothing management.   Toilet transfer to toilet with Modified [place].                         Time Tracking:     OT Date of Treatment: 11/09/23  OT Start Time: 1348  OT Stop Time: 1411  OT Total Time (min): 23 min    Billable Minutes:Therapeutic Exercise 23    OT/PATSY: OT          11/9/2023

## 2023-11-09 NOTE — PT/OT/SLP PROGRESS
Physical Therapy Treatment    Patient Name:  Jos Espino   MRN:  [id number]    Recommendations:     Discharge Recommendations: High Intensity Therapy  Discharge Equipment Recommendations: walker, rolling  Barriers to discharge:   decrease left LE function, poor quad strength, high fall risk, decrease activity tolerance, increase assist with mobility    Assessment:     Jos Espino is a 68 y.o. male admitted with a medical diagnosis of Nontraumatic psoas hematoma.  He presents with the following impairments/functional limitations: weakness, impaired endurance, impaired self care skills, impaired functional mobility, gait instability, impaired balance, decreased coordination, decreased ROM, impaired cardiopulmonary response to activity, decreased safety awareness.    Pt found in bed with HOB elevated. Pt agreeable to visit. Pt required self LE hooking with right  LE to assist with bed mobility. Pt continue to present with poor quad contraction/strength against gravity. Note moderate quad set. Improved hip add/abd against gravity and in gravity minimize positions.  Pt with improved gait distance and RW management but safety with ambulation still limited due to impaired quad strength greatest with LLE terminal knee extension.     Rehab Prognosis: Fair; patient would benefit from acute skilled PT services to address these deficits and reach maximum level of function.    Recent Surgery: Procedure(s) (LRB):  INCISION AND DRAINAGE, HEMATOMA (Left) 8 Days Post-Op    Plan:     During this hospitalization, patient to be seen daily to address the identified rehab impairments via gait training, therapeutic activities, therapeutic exercises, neuromuscular re-education and progress toward the following goals:    Plan of Care Expires:  12/08/23    Subjective     Chief Complaint: not able to go to rehab  Patient/Family Comments/goals: go to rehab  Pain/Comfort:  Pain Rating 1: 0/10      Objective:     Communicated with RN prior to  session.  Patient found HOB elevated with peripheral IV, telemetry upon PT entry to room.     General Precautions: Standard, fall  Orthopedic Precautions: N/A  Braces: N/A  Respiratory Status: Room air     Functional Mobility:  Bed Mobility:     Supine to Sit: supervision  Transfers:     Sit to Stand:  contact guard assistance with rolling walker  Gait: 95 ft x 4 with RW and min A due to poor terminal knee extension and increase risk for LLE knee buckling due to decrease quad strength requiring increase reliance of UE support on RW      AM-PAC 6 CLICK MOBILITY          Treatment & Education:  Pt educated on POC, discharge recommendation, need for assist with mobility, importance of time OOB, optimal gait pattern, RW management, quad activation, use of call bell to seek assistance as needed and fall prevention    Pt tolerates standing TE consistent of B hip flexion (with improved ROM/strength), hip abd and heel raise good WS on left LE for R movement and min VI for proper form/pacing for strengthening    Patient left up in chair with all lines intact and call button in reach..    GOALS:   Multidisciplinary Problems       Physical Therapy Goals          Problem: Physical Therapy    Goal Priority Disciplines Outcome Goal Variances Interventions   Physical Therapy Goal     PT, PT/OT Ongoing, Progressing     Description: Goals to be met by: 23     Patient will increase functional independence with mobility by performin. Supine to sit with Supervision  2. Sit to stand transfer with Supervision  3. Bed to chair transfer with Supervision using Rolling Walker  4. Gait  x 150 feet with Supervision using Rolling Walker.                              Time Tracking:     PT Received On: 23  PT Start Time: 1005     PT Stop Time: 1028  PT Total Time (min): 23 min     Billable Minutes: Gait Training 15 and Therapeutic Exercise 8    Treatment Type: Treatment  PT/PTA: PT     Number of PTA visits since last PT visit: 0      11/09/2023

## 2023-11-09 NOTE — PLAN OF CARE
Patient cleared to admit to [facility] per liamary Franklin.  Report information given to nurse Kelly via Secure Chat.  Facility arranging transportation.     Discharge orders and chart reviewed with no further post-acute discharge needs identified at this time.  At this time, patient is cleared for discharge from Case Management standpoint.        11/09/23 1602   Final Note   Assessment Type Final Discharge Note   Anticipated Discharge Disposition SNF   Post-Acute Status   Post-Acute Placement Status Set-up Complete/Auth obtained   Discharge Delays (!) Ambulance Transport/Facility Transport

## 2023-11-09 NOTE — PLAN OF CARE
Inpatient rehab denied by [facility].  Patient [facility] per [facility].  Yuri Franklin has submitted for SNF auth.  Met with patient at bedside and gave update, patient verbalized agreement and understanding.        11/09/23 0916   Post-Acute Status   Post-Acute Authorization Placement   Post-Acute Placement Status Pending payor review/awaiting authorization (if required)   Discharge Plan   Discharge Plan A Skilled Nursing Facility   Discharge Plan B Skilled Nursing Facility

## 2023-11-09 NOTE — PLAN OF CARE
Discharge orders uploaded into [facility]. CXR ordered for SNF admission.        11/09/23 1423   Post-Acute Status   Post-Acute Authorization Placement   Post-Acute Placement Status Set-up Complete/Auth obtained   Discharge Plan   Discharge Plan A Skilled Nursing Facility

## 2023-11-09 NOTE — PROGRESS NOTES
Min ss drainage from [facility] site  Expect some drainage  Ok to change bandage  Dc when clear medically  Will sign off for now

## 2023-11-09 NOTE — PT/OT/SLP PROGRESS
"Occupational Therapy   Treatment    Name: Jos Espino  MRN: [id number]  Admitting Diagnosis:  Nontraumatic psoas hematoma  7 Days Post-Op  Pre-op Diagnosis: Retroperitoneal hemorrhage [R58  Procedure(s):  INCISION AND DRAINAGE, HEMATOMA     Recommendations:     Discharge Recommendations: High Intensity Therapy  Discharge Equipment Recommendations:  walker, rolling  Barriers to discharge:  None    Assessment:     Jos Espino is a 68 y.o. male with a medical diagnosis of Nontraumatic psoas hematoma.  He presents with good progress toward OT goals. Pt report L leg pain 2/10 less today than previous days.Pt says he does not "trust" the leg when he walks long distance, feels like its going to "buckle" on him. No buckling noted this date with ambulation in room using the RW while engaged in his ADLS.  G/hygiene standing SBA at sink with RW, toilet t/f CGA with RW, pt declined toileting- simulated task with CGA for steadying assist. LE dressing SBA to don/doff socks and shoes, SBA to don underwear seated EOB and stood with unilateral support on RW for maintaining safe standing balance. Continue with OT POC.     . Performance deficits affecting function are weakness, impaired endurance, impaired self care skills, impaired functional mobility, impaired balance, gait instability, pain, decreased lower extremity function.     Rehab Prognosis:  Good; patient would benefit from acute skilled OT services to address these deficits and reach maximum level of function.       Plan:     Patient to be seen 6 x/week to address the above listed problems via self-care/home management, therapeutic activities, therapeutic exercises  Plan of Care Expires: 12/02/23  Plan of Care Reviewed with: patient    Subjective     Chief Complaint: L leg pain 2/10.  Patient/Family Comments/goals: To get the strength back on his left leg.  Pain/Comfort:  Pain Rating 1: 2/10  Location - Side 1: Left  Location - Orientation 1: upper  Location 1: leg  Pain " Addressed 1: Reposition, Distraction, Cessation of Activity  Pain Rating Post-Intervention 1: 2/10    Objective:     Communicated with: nurse prior to session.  Patient found HOB elevated with telemetry upon OT entry to room.    General Precautions: Standard, fall    Orthopedic Precautions:N/A  Braces: N/A  Respiratory Status: Room air     Occupational Performance:     Bed Mobility:    Patient completed Rolling/Turning to Left with  supervision  Patient completed Scooting/Bridging with supervision  Patient completed Supine to Sit with supervision  Patient completed Sit to Supine with supervision     Functional Mobility/Transfers:  Patient completed Sit <> Stand Transfer with contact guard assistance  with  rolling walker   Patient completed Toilet Transfer Step Transfer technique with contact guard assistance with  rolling walker and grab bars  Functional Mobility: ambulated CGA-SBA with RW in room for ADLS, to bathroom, sink and back to bed.    Activities of Daily Living:  Grooming: stand by assistance washed hands standing inside RW at sink; pt declined other tasks.  Lower Body Dressing: stand by assistance socks, shoes and underwear donned seated EOB, stood with RW to pull underwear over hips; pt kept one hand on RW and progressed to fee standing to adjust garment over hips.   Toileting: contact guard assistance simulated task due to pt declined gagandeep to use.    Treatment & Education:  Purpose of OT and POC  Pt. seen for self care retraining and functional mobility retraining with adapted techniques and modifications as stated above.  Pt instructed in fall prevention strategies.  All questions and concerns addressed within scope.      Patient left HOB elevated with all lines intact, call button in reach, and bed alarm on    GOALS:   Multidisciplinary Problems       Occupational Therapy Goals          Problem: Occupational Therapy    Goal Priority Disciplines Outcome Interventions   Occupational Therapy Goal     OT,  PT/OT Ongoing, Progressing    Description: Goals to be met by: 12/2/23     Patient will increase functional independence with ADLs by performing:    LE Dressing with Modified [place] using AD as needed.  Grooming while standing at sink with Modified [place].  Toileting from toilet with Modified [place] for hygiene and clothing management.   Toilet transfer to toilet with Modified [place].                         Time Tracking:     OT Date of Treatment: 11/08/23  OT Start Time: 1351  OT Stop Time: 1406  OT Total Time (min): 15 min    Billable Minutes:Self Care/Home Management 15  Total Time 15    OT/PATSY: OT          11/8/2023

## 2023-11-09 NOTE — DISCHARGE SUMMARY
[facility] Medicine  Discharge Summary      Patient Name: Jos Espino  MRN: [id number]  AARON: [id number]  Patient Class: IP- Inpatient  Admission Date: 10/30/2023  Hospital Length of Stay: 8 days  Discharge Date and Time:  11/09/2023 1:29 PM  Attending Physician: Carter Ramos MD   Discharging Provider: Carter Ramos MD  Primary Care Provider: Abiel Salguero MD    Primary Care Team: Networked reference to record PCT     HPI:   68 year old  male presents emergency room with left pelvis/groin pain.  The patient states the pain began yesterday.  He does admit to attending exercise classes that includes exercising his legs with weights about 4 to 5 times a week at the [employer] exercise gym.  But he denied actual trauma.  The patient states his left thigh pain became excruciating with minimal range of motion so he came to emergency room for further evaluation. In the emergency room radiographic imaging revealed a Iliopsoas and left pelvic side wall intramuscular hematomas with a small amount of what is likely hemorrhage within the left retroperitoneum. General surgery was consulted and agreed to see the patient in consult.  The patient will be given pain management and monitored closely. Past medical history significant for hyperlipidemia ED and hypertension that is diet-controlled. The patient is not on anticoagulation and denies excessive NSAID use      Procedure(s) (LRB):  INCISION AND DRAINAGE, HEMATOMA (Left)      Hospital Course:   68M with PMH HTN, HLD, and BPH s/p TURP was admitted with nontraumatic hematoma to left iliopsoas and pelvic wall. Hemoglobin was monitored and down trended and he was transfused 1 unit PRBC.  His hemoglobin has been stable since.  Gen surg consulted and he underwent hematoma evacuation with drain placement.  Drain output was monitored and slowly improved.  His drain was removed on 11/06. Neurology was also consulted for numbness  and weakness to LLE.  This improved with drainage of the hematoma.  He had a couple fever spikes and has been on empiric Zosyn.  Fever has resolved.  He has been following with PT and OT.  His strength is slowly improving but not back to baseline.  He is awaiting placement in rehab for further therapy. Drain is now removed. Patient is subsequently discharged to SNF.        Goals of Care Treatment Preferences:  Code Status: Full Code       Physical Exam  Vitals reviewed.   Constitutional:       Appearance: Normal appearance.   HENT:      Head: Normocephalic and atraumatic.      Nose: Nose normal.      Mouth/Throat:      Mouth: Mucous membranes are moist.   Eyes:      Pupils: Pupils are equal, round, and reactive to light.   Cardiovascular:      Rate and Rhythm: Normal rate and regular rhythm.      Pulses: Normal pulses.      Heart sounds: Normal heart sounds. No murmur heard.     No friction rub. No gallop.   Pulmonary:      Effort: Pulmonary effort is normal. No respiratory distress.      Breath sounds: Normal breath sounds.   Abdominal:      General: Abdomen is flat. Bowel sounds are normal. There is no distension.      Palpations: Abdomen is soft.      Tenderness: There is no abdominal tenderness.      Comments: LLQ with dressing, drain is removed, mild ttp immediately around the incision site but otherwise the area is soft and nontender   Musculoskeletal:         General: No swelling. Normal range of motion.      Cervical back: Normal range of motion and neck supple.   Skin:     General: Skin is warm and dry.   Neurological:      General: No focal deficit present.      Mental Status: He is alert and oriented to person, place, and time.   Psychiatric:         Mood and Affect: Mood normal.         Behavior: Behavior normal.         Thought Content: Thought content normal.         Judgment: Judgment normal.        Consults:   Consults (From admission, onward)        Status Ordering Provider     Inpatient consult to  Social Work/Case Management  Once        Provider:  (Not yet assigned)    Completed GABBY GOMEZ     Inpatient consult to Neurology  Once        Provider:  Johnathon Reddy MD    Completed PAUL INTERIANO     Inpatient consult to General surgery  Once        Provider:  Paul Interiano MD    Completed OZ CRUMP          Assessment & Plan    Nontraumatic psoas hematoma  CT reviewed  - he will need stitches taken out in 1 week.   - planned follow up on 11/14.      Enlarged prostate with lower urinary tract symptoms (LUTS)           Hypercholesteremia  Chronic, continue statin    Service: Hospital Medicine    Final Active Diagnoses:    Diagnosis Date Noted POA    PRINCIPAL PROBLEM:  Nontraumatic psoas hematoma [M79.81] 10/31/2023 Yes    Enlarged prostate with lower urinary tract symptoms (LUTS) [N40.1] 04/07/2022 Yes    Hypercholesteremia [E78.00] 12/09/2013 Yes     Chronic      Problems Resolved During this Admission:       Discharged Condition: good    Disposition: Skilled Nursing Facility    Follow Up:   Follow-up Information     Abiel Salguero MD Follow up on 11/17/2023.    Specialty: Family Medicine  Why: at 2:15 PM for hospital follow up.  New address and phone number for Dr. Salguero is [address] [place] [address] and phone number is [phone].  Contact information:  [address] [place] [address]  [phone]             Paul Interiano MD Follow up.    Specialties: General Surgery, Bariatrics, Surgery  Contact information:  [address] [place] [address] [phone]                       Patient Instructions:   No discharge procedures on file.    Significant Diagnostic Studies: Labs:   CMP   Recent Labs   Lab 11/08/23  0521 11/09/23  0427    136   K 5.7* 5.1    104   CO2 28 26   * 111*   BUN 14 15   CREATININE 1.1 1.0   CALCIUM 10.1 9.9   PROT 7.4 7.2   ALBUMIN 3.6 3.5   BILITOT 1.2* 1.1*   ALKPHOS 80 76   AST 56*  55*   ALT 76* 73*   ANIONGAP 4* 6*    and CBC   Recent Labs   Lab 11/08/23  0521 11/09/23  0427   WBC 12.78* 12.60   HGB 9.4* 8.8*   HCT 28.5* 26.6*   * 458*       Pending Diagnostic Studies:     None         Medications:  Reconciled Home Medications:      Medication List      START taking these medications    HYDROcodone-acetaminophen  mg per tablet  Commonly known as: NORCO  Take 2 tablets by mouth every 6 (six) hours as needed for Pain.        CONTINUE taking these medications    atorvastatin 10 MG tablet  Commonly known as: LIPITOR  Take 10 mg by mouth once daily.     CENTRUM SILVER 0.4 mg-300 mcg- 250 mcg Tab  Generic drug: multivit-min-FA-lycopen-lutein  Take 1 tablet by mouth once daily.     tadalafiL 5 MG tablet  Commonly known as: CIALIS  Take 1 tablet (5 mg total) by mouth daily as needed for Erectile Dysfunction.        STOP taking these medications    hydroquinone 4 % Crea            Indwelling Lines/Drains at time of discharge:   Lines/Drains/Airways     None                 Time spent on the discharge of patient: 40 minutes         Carter Ramos MD  Department of Hospital Medicine  [facility]

## 2023-11-13 ENCOUNTER — PATIENT MESSAGE (OUTPATIENT)
Dept: SURGERY | Facility: CLINIC | Age: 68
End: 2023-11-13
Payer: MEDICARE

## 2023-11-14 ENCOUNTER — TELEPHONE (OUTPATIENT)
Dept: HEMATOLOGY/ONCOLOGY | Facility: CLINIC | Age: 68
End: 2023-11-14
Payer: MEDICARE

## 2023-11-14 ENCOUNTER — OFFICE VISIT (OUTPATIENT)
Dept: SURGERY | Facility: CLINIC | Age: 68
End: 2023-11-14
Payer: MEDICARE

## 2023-11-14 VITALS
SYSTOLIC BLOOD PRESSURE: 142 MMHG | HEIGHT: 70 IN | RESPIRATION RATE: 16 BRPM | BODY MASS INDEX: 25.05 KG/M2 | DIASTOLIC BLOOD PRESSURE: 80 MMHG | WEIGHT: 175 LBS | TEMPERATURE: 99 F | HEART RATE: 112 BPM

## 2023-11-14 DIAGNOSIS — K68.3 NONTRAUMATIC RETROPERITONEAL HEMATOMA: Primary | ICD-10-CM

## 2023-11-14 PROCEDURE — 1125F AMNT PAIN NOTED PAIN PRSNT: CPT | Mod: CPTII,S$GLB,, | Performed by: SURGERY

## 2023-11-14 PROCEDURE — 1125F PR PAIN SEVERITY QUANTIFIED, PAIN PRESENT: ICD-10-PCS | Mod: CPTII,S$GLB,, | Performed by: SURGERY

## 2023-11-14 PROCEDURE — 3079F PR MOST RECENT DIASTOLIC BLOOD PRESSURE 80-89 MM HG: ICD-10-PCS | Mod: CPTII,S$GLB,, | Performed by: SURGERY

## 2023-11-14 PROCEDURE — 3079F DIAST BP 80-89 MM HG: CPT | Mod: CPTII,S$GLB,, | Performed by: SURGERY

## 2023-11-14 PROCEDURE — 99024 PR POST-OP FOLLOW-UP VISIT: ICD-10-PCS | Mod: S$GLB,,, | Performed by: SURGERY

## 2023-11-14 PROCEDURE — 99999 PR PBB SHADOW E&M-EST. PATIENT-LVL IV: ICD-10-PCS | Mod: PBBFAC,,, | Performed by: SURGERY

## 2023-11-14 PROCEDURE — 3008F PR BODY MASS INDEX (BMI) DOCUMENTED: ICD-10-PCS | Mod: CPTII,S$GLB,, | Performed by: SURGERY

## 2023-11-14 PROCEDURE — 99999 PR PBB SHADOW E&M-EST. PATIENT-LVL IV: CPT | Mod: PBBFAC,,, | Performed by: SURGERY

## 2023-11-14 PROCEDURE — 1111F DSCHRG MED/CURRENT MED MERGE: CPT | Mod: CPTII,S$GLB,, | Performed by: SURGERY

## 2023-11-14 PROCEDURE — 1159F PR MEDICATION LIST DOCUMENTED IN MEDICAL RECORD: ICD-10-PCS | Mod: CPTII,S$GLB,, | Performed by: SURGERY

## 2023-11-14 PROCEDURE — 3077F SYST BP >= 140 MM HG: CPT | Mod: CPTII,S$GLB,, | Performed by: SURGERY

## 2023-11-14 PROCEDURE — 1159F MED LIST DOCD IN RCRD: CPT | Mod: CPTII,S$GLB,, | Performed by: SURGERY

## 2023-11-14 PROCEDURE — 3077F PR MOST RECENT SYSTOLIC BLOOD PRESSURE >= 140 MM HG: ICD-10-PCS | Mod: CPTII,S$GLB,, | Performed by: SURGERY

## 2023-11-14 PROCEDURE — 1111F PR DISCHARGE MEDS RECONCILED W/ CURRENT OUTPATIENT MED LIST: ICD-10-PCS | Mod: CPTII,S$GLB,, | Performed by: SURGERY

## 2023-11-14 PROCEDURE — 3008F BODY MASS INDEX DOCD: CPT | Mod: CPTII,S$GLB,, | Performed by: SURGERY

## 2023-11-14 PROCEDURE — 99024 POSTOP FOLLOW-UP VISIT: CPT | Mod: S$GLB,,, | Performed by: SURGERY

## 2023-11-14 NOTE — PROGRESS NOTES
68-year-old male comes as a hospital follow-up after spontaneous retroperitoneal hematoma.  He is not had any recent traumatic events nor does he take any blood thinners.  This appears to have happened spontaneously.  In the hospital he developed a neurapraxia which resulted in emergent drainage of the hematoma in order retain his ability to walk.  The hematoma returned despite this although he is still preserved his ability to walk.  He is currently residing at a rehab center for physical therapy.  He returns today to remove sutures from his skin wounds that persistently bled after surgery.    Vitals:    11/14/23 0919   BP: (!) 142/80   Pulse: (!) 112   Resp: 16   Temp: 98.5 °F (36.9 °C)     Left inguinal wound with sutures removed.  There was an opening medially with exposed hematoma.  Steri-Strips were placed over the wound and did not occlude the hematoma drainage.  Dressings were placed over this.  Otherwise there is a palpable hematoma in his left inguinal area.  He is walking with a walker.  He still has some weakness and numbness in his leg.    Assessment plan  Spontaneous retroperitoneal hematoma causing neurapraxia  -exposed hematoma will likely have some drainage and will be expected.  This is not his retroperitoneal hematoma but a hematoma within his wound.  His blood levels are evaluated and he is still anemic but stable blood level was.  -retroperitoneal hematomas likely resolving on its own  -should continue physical therapy for walking  -referred to Hematology as we are not clear as to the cause of his hematoma.  He also bled very easily from surgery and had difficulty clotting his blood after surgery with the wound requiring sutures in order to stop skin edge bleeding.  I am not sure if this indicates a bleeding disorder but I would like him to see a hematologist to discuss these possibilities.    -if wound begins to appear red or happen foul smell he should notify us immediately.  I do worry that  the wound will become infected.  There are currently no signs of infection.

## 2023-11-14 NOTE — NURSING
Oncology Navigation   Intake  Cancer Type: Benign hem  Internal / External Referral: Internal (Dr Farias)  Date of Referral: 11/14/23  Initial Nurse Navigator Contact: 11/14/23  Referral to Initial Contact Timeline (days): 0  Date Worked: 11/14/23  First Appointment Available: 11/17/23  Appointment Date: 11/17/23 (Dr Khoobehi)  First Available Date vs. Scheduled Date (days): 0     Treatment                              Acuity      Follow Up  No follow-ups on file.

## 2023-11-14 NOTE — PATIENT INSTRUCTIONS
See hematology    Sutures were removed at the bedside and there is a mild opening on the lateral side of the wound.  I do expect some drainage from this area.  If there is any redness or purulent drainage please start him on antibiotics and refer him back to me.    Wash over incision daily with soap and water

## 2023-11-17 ENCOUNTER — LAB VISIT (OUTPATIENT)
Dept: LAB | Facility: HOSPITAL | Age: 68
End: 2023-11-17
Attending: INTERNAL MEDICINE
Payer: MEDICARE

## 2023-11-17 ENCOUNTER — OFFICE VISIT (OUTPATIENT)
Dept: HEMATOLOGY/ONCOLOGY | Facility: CLINIC | Age: 68
End: 2023-11-17
Payer: MEDICARE

## 2023-11-17 VITALS
RESPIRATION RATE: 18 BRPM | DIASTOLIC BLOOD PRESSURE: 82 MMHG | BODY MASS INDEX: 24.46 KG/M2 | WEIGHT: 170.88 LBS | TEMPERATURE: 96 F | HEIGHT: 70 IN | SYSTOLIC BLOOD PRESSURE: 153 MMHG | HEART RATE: 94 BPM | OXYGEN SATURATION: 98 %

## 2023-11-17 DIAGNOSIS — R79.1 ABNORMAL COAGULATION PROFILE: ICD-10-CM

## 2023-11-17 DIAGNOSIS — K68.3 NONTRAUMATIC RETROPERITONEAL HEMATOMA: ICD-10-CM

## 2023-11-17 DIAGNOSIS — K68.3 NONTRAUMATIC RETROPERITONEAL HEMATOMA: Primary | ICD-10-CM

## 2023-11-17 DIAGNOSIS — L76.82 BLEEDING AT INSERTION SITE: Primary | ICD-10-CM

## 2023-11-17 DIAGNOSIS — L76.82 BLEEDING AT INSERTION SITE: ICD-10-CM

## 2023-11-17 LAB
APTT PPP: 67.8 SEC (ref 21–32)
INR PPP: 1.1 (ref 0.8–1.2)
PROTHROMBIN TIME: 11.7 SEC (ref 9–12.5)

## 2023-11-17 PROCEDURE — 99204 OFFICE O/P NEW MOD 45 MIN: CPT | Mod: S$GLB,,, | Performed by: INTERNAL MEDICINE

## 2023-11-17 PROCEDURE — 1159F PR MEDICATION LIST DOCUMENTED IN MEDICAL RECORD: ICD-10-PCS | Mod: CPTII,S$GLB,, | Performed by: INTERNAL MEDICINE

## 2023-11-17 PROCEDURE — 1159F MED LIST DOCD IN RCRD: CPT | Mod: CPTII,S$GLB,, | Performed by: INTERNAL MEDICINE

## 2023-11-17 PROCEDURE — 99204 PR OFFICE/OUTPT VISIT, NEW, LEVL IV, 45-59 MIN: ICD-10-PCS | Mod: S$GLB,,, | Performed by: INTERNAL MEDICINE

## 2023-11-17 PROCEDURE — 1126F AMNT PAIN NOTED NONE PRSNT: CPT | Mod: CPTII,S$GLB,, | Performed by: INTERNAL MEDICINE

## 2023-11-17 PROCEDURE — 3077F SYST BP >= 140 MM HG: CPT | Mod: CPTII,S$GLB,, | Performed by: INTERNAL MEDICINE

## 2023-11-17 PROCEDURE — 1160F RVW MEDS BY RX/DR IN RCRD: CPT | Mod: CPTII,S$GLB,, | Performed by: INTERNAL MEDICINE

## 2023-11-17 PROCEDURE — 3077F PR MOST RECENT SYSTOLIC BLOOD PRESSURE >= 140 MM HG: ICD-10-PCS | Mod: CPTII,S$GLB,, | Performed by: INTERNAL MEDICINE

## 2023-11-17 PROCEDURE — 1101F PT FALLS ASSESS-DOCD LE1/YR: CPT | Mod: CPTII,S$GLB,, | Performed by: INTERNAL MEDICINE

## 2023-11-17 PROCEDURE — 3079F PR MOST RECENT DIASTOLIC BLOOD PRESSURE 80-89 MM HG: ICD-10-PCS | Mod: CPTII,S$GLB,, | Performed by: INTERNAL MEDICINE

## 2023-11-17 PROCEDURE — 1111F DSCHRG MED/CURRENT MED MERGE: CPT | Mod: CPTII,S$GLB,, | Performed by: INTERNAL MEDICINE

## 2023-11-17 PROCEDURE — 1101F PR PT FALLS ASSESS DOC 0-1 FALLS W/OUT INJ PAST YR: ICD-10-PCS | Mod: CPTII,S$GLB,, | Performed by: INTERNAL MEDICINE

## 2023-11-17 PROCEDURE — 99999 PR PBB SHADOW E&M-EST. PATIENT-LVL V: CPT | Mod: PBBFAC,,, | Performed by: INTERNAL MEDICINE

## 2023-11-17 PROCEDURE — 36415 COLL VENOUS BLD VENIPUNCTURE: CPT | Performed by: INTERNAL MEDICINE

## 2023-11-17 PROCEDURE — 3288F FALL RISK ASSESSMENT DOCD: CPT | Mod: CPTII,S$GLB,, | Performed by: INTERNAL MEDICINE

## 2023-11-17 PROCEDURE — 3288F PR FALLS RISK ASSESSMENT DOCUMENTED: ICD-10-PCS | Mod: CPTII,S$GLB,, | Performed by: INTERNAL MEDICINE

## 2023-11-17 PROCEDURE — 85610 PROTHROMBIN TIME: CPT | Performed by: INTERNAL MEDICINE

## 2023-11-17 PROCEDURE — 99999 PR PBB SHADOW E&M-EST. PATIENT-LVL V: ICD-10-PCS | Mod: PBBFAC,,, | Performed by: INTERNAL MEDICINE

## 2023-11-17 PROCEDURE — 1160F PR REVIEW ALL MEDS BY PRESCRIBER/CLIN PHARMACIST DOCUMENTED: ICD-10-PCS | Mod: CPTII,S$GLB,, | Performed by: INTERNAL MEDICINE

## 2023-11-17 PROCEDURE — 3008F BODY MASS INDEX DOCD: CPT | Mod: CPTII,S$GLB,, | Performed by: INTERNAL MEDICINE

## 2023-11-17 PROCEDURE — 3008F PR BODY MASS INDEX (BMI) DOCUMENTED: ICD-10-PCS | Mod: CPTII,S$GLB,, | Performed by: INTERNAL MEDICINE

## 2023-11-17 PROCEDURE — 85730 THROMBOPLASTIN TIME PARTIAL: CPT | Performed by: INTERNAL MEDICINE

## 2023-11-17 PROCEDURE — 1126F PR PAIN SEVERITY QUANTIFIED, NO PAIN PRESENT: ICD-10-PCS | Mod: CPTII,S$GLB,, | Performed by: INTERNAL MEDICINE

## 2023-11-17 PROCEDURE — 1111F PR DISCHARGE MEDS RECONCILED W/ CURRENT OUTPATIENT MED LIST: ICD-10-PCS | Mod: CPTII,S$GLB,, | Performed by: INTERNAL MEDICINE

## 2023-11-17 PROCEDURE — 3079F DIAST BP 80-89 MM HG: CPT | Mod: CPTII,S$GLB,, | Performed by: INTERNAL MEDICINE

## 2023-11-17 NOTE — PROGRESS NOTES
Service Date:  11/17/23    Chief Complaint: nontraumatic retroperitonal hematoma (NP  )    Jos Espino is a 68 y.o. male recently hospitalized with a spontaneous retroperitoneal hematoma. He was not on any blood thinners at the time.   He did not suffer any traumatic event.  States he goes to gym and does lift weights.  He had evacuation of the hematoma as he was having some neurapraxia.  Hematoma then returned but his ability to walk was preserved.  He also had some bleeding issues with his sutures with removal.  He states he is never had bleeding issues prior.  He has never had any problems with prolonged bleeding from shaving or brushing his teeth.    Review of Systems   Constitutional: Negative.    HENT: Negative.     Eyes: Negative.    Respiratory: Negative.     Cardiovascular: Negative.    Gastrointestinal: Negative.    Endocrine: Negative.    Genitourinary: Negative.    Musculoskeletal: Negative.    Integumentary:  Negative.   Neurological: Negative.    Hematological: Negative.    Psychiatric/Behavioral: Negative.          Current Outpatient Medications   Medication Instructions    atorvastatin (LIPITOR) 10 mg, Oral, Daily    HYDROcodone-acetaminophen (NORCO)  mg per tablet 2 tablets, Oral, Every 6 hours PRN    multivit-min-FA-lycopen-lutein (CENTRUM SILVER) 0.4 mg-300 mcg- 250 mcg Tab 1 tablet, Oral, Daily    tadalafiL (CIALIS) 5 mg, Oral, Daily PRN        Past Medical History:   Diagnosis Date    Hyperlipidemia         Past Surgical History:   Procedure Laterality Date    INCISION AND DRAINAGE OF HEMATOMA Left 11/1/2023    Procedure: INCISION AND DRAINAGE, HEMATOMA;  Surgeon: Melba Farias MD;  Location: [facility] OR;  Service: General;  Laterality: Left;    TRANSURETHRAL RESECTION OF PROSTATE N/A 4/7/2022    Procedure: TURP (TRANSURETHRAL RESECTION OF PROSTATE);  Surgeon: Ricky Rousseau Jr., MD;  Location: [facility] OR;  Service: Urology;  Laterality: N/A;        Family History   Problem Relation Age of  "Onset    Hypertension Brother     Cancer Brother         prostate       Social History     Tobacco Use    Smoking status: Never    Smokeless tobacco: Never   Substance Use Topics    Alcohol use: Yes     Comment: seldom    Drug use: No         Vitals:    11/17/23 0938   BP: (!) 153/82   Pulse: 94   Resp: 18   Temp: 96.2 °F (35.7 °C)        Physical Exam:  BP (!) 153/82 (BP Location: Right arm, Patient Position: Sitting, BP Method: Small (Automatic))   Pulse 94   Temp 96.2 °F (35.7 °C) (Temporal)   Resp 18   Ht 5' 10" (1.778 m)   Wt 77.5 kg (170 lb 13.7 oz)   SpO2 98%   BMI 24.52 kg/m²     Physical Exam  Vitals and nursing note reviewed.   Constitutional:       Appearance: Normal appearance.   HENT:      Head: Normocephalic and atraumatic.      Nose: Nose normal.      Mouth/Throat:      Mouth: Mucous membranes are moist.      Pharynx: Oropharynx is clear.   Eyes:      Extraocular Movements: Extraocular movements intact.      Conjunctiva/sclera: Conjunctivae normal.   Cardiovascular:      Rate and Rhythm: Normal rate and regular rhythm.      Heart sounds: Normal heart sounds.   Pulmonary:      Effort: Pulmonary effort is normal.      Breath sounds: Normal breath sounds.   Abdominal:      General: Abdomen is flat. Bowel sounds are normal.      Palpations: Abdomen is soft.   Musculoskeletal:         General: Normal range of motion.      Cervical back: Normal range of motion and neck supple.   Skin:     General: Skin is warm and dry.   Neurological:      General: No focal deficit present.      Mental Status: He is alert and oriented to person, place, and time. Mental status is at baseline.   Psychiatric:         Mood and Affect: Mood normal.          Labs:  Lab Results   Component Value Date    WBC 12.60 11/09/2023    RBC 3.24 (L) 11/09/2023    HGB 8.8 (L) 11/09/2023    HCT 26.6 (L) 11/09/2023    MCV 82 11/09/2023    MCH 27.2 11/09/2023    MCHC 33.1 11/09/2023    RDW 14.5 11/09/2023     (H) 11/09/2023    MPV " 11.2 11/09/2023    GRAN 7.7 11/09/2023    GRAN 60.9 11/09/2023    LYMPH 2.1 11/09/2023    LYMPH 16.7 (L) 11/09/2023    MONO 1.9 (H) 11/09/2023    MONO 14.8 11/09/2023    EOS 0.6 (H) 11/09/2023    BASO 0.13 11/09/2023    EOSINOPHIL 4.5 11/09/2023    BASOPHIL 1.0 11/09/2023     Sodium   Date Value Ref Range Status   11/09/2023 136 136 - 145 mmol/L Final     Potassium   Date Value Ref Range Status   11/09/2023 5.1 3.5 - 5.1 mmol/L Final     Chloride   Date Value Ref Range Status   11/09/2023 104 95 - 110 mmol/L Final     CO2   Date Value Ref Range Status   11/09/2023 26 23 - 29 mmol/L Final     Glucose   Date Value Ref Range Status   11/09/2023 111 (H) 70 - 110 mg/dL Final     BUN   Date Value Ref Range Status   11/09/2023 15 8 - 23 mg/dL Final     Creatinine   Date Value Ref Range Status   11/09/2023 1.0 0.5 - 1.4 mg/dL Final     Calcium   Date Value Ref Range Status   11/09/2023 9.9 8.7 - 10.5 mg/dL Final     Total Protein   Date Value Ref Range Status   11/09/2023 7.2 6.0 - 8.4 g/dL Final     Albumin   Date Value Ref Range Status   11/09/2023 3.5 3.5 - 5.2 g/dL Final     Total Bilirubin   Date Value Ref Range Status   11/09/2023 1.1 (H) 0.1 - 1.0 mg/dL Final     Comment:     For infants and newborns, interpretation of results should be based  on gestational age, weight and in agreement with clinical  observations.    Premature Infant recommended reference ranges:  Up to 24 hours.............<8.0 mg/dL  Up to 48 hours............<12.0 mg/dL  3-5 days..................<15.0 mg/dL  6-29 days.................<15.0 mg/dL       Alkaline Phosphatase   Date Value Ref Range Status   11/09/2023 76 55 - 135 U/L Final     AST   Date Value Ref Range Status   11/09/2023 55 (H) 10 - 40 U/L Final     ALT   Date Value Ref Range Status   11/09/2023 73 (H) 10 - 44 U/L Final     Anion Gap   Date Value Ref Range Status   11/09/2023 6 (L) 8 - 16 mmol/L Final     eGFR if    Date Value Ref Range Status   04/07/2022 >60 >60  mL/min/1.73 m^2 Final     eGFR if non    Date Value Ref Range Status   04/07/2022 >60 >60 mL/min/1.73 m^2 Final     Comment:     Calculation used to obtain the estimated glomerular filtration  rate (eGFR) is the CKD-EPI equation.          A/P:    Hematoma   Prolonged bleeding   -I will get PT, PTT and von Willebrand's.  I will call him back with the results.  Given his lack of history of any bleeding disorder, I doubt that he has a bleeding disorder but since it is difficult to explain the hematoma, I will go ahead and do the workup.      Aurash Khoobehi, MD  Hematology and Oncology

## 2023-11-18 ENCOUNTER — PATIENT MESSAGE (OUTPATIENT)
Dept: SURGERY | Facility: CLINIC | Age: 68
End: 2023-11-18
Payer: MEDICARE

## 2023-11-20 ENCOUNTER — PATIENT MESSAGE (OUTPATIENT)
Dept: HEMATOLOGY/ONCOLOGY | Facility: CLINIC | Age: 68
End: 2023-11-20
Payer: MEDICARE

## 2023-11-20 ENCOUNTER — TELEPHONE (OUTPATIENT)
Dept: HEMATOLOGY/ONCOLOGY | Facility: CLINIC | Age: 68
End: 2023-11-20
Payer: MEDICARE

## 2023-11-20 NOTE — TELEPHONE ENCOUNTER
See portal messages.    ----- Message from Henrique Foster sent at 11/20/2023  1:03 PM CST -----  Type: Need Medical Advice   Who Called: Wife of patient   Best callback number: [phone]  Additional Information: wife of patient called to speak with Janine about having the patient labs done in rehab at [facility] in [place]  PHONE   Please call to further assist, Thanks.

## 2023-11-20 NOTE — TELEPHONE ENCOUNTER
Duplicate message. See portal messages.    ----- Message from Deborah Miner, Patient Care Assistant sent at 11/20/2023 10:20 AM CST -----  Type: Needs Medical Advice  Who Called:  trice   Best Call Back Number: [phone]    Additional Information: trice is returning call from johnathon , please call to further discuss,thank you

## 2023-11-28 ENCOUNTER — LAB VISIT (OUTPATIENT)
Dept: LAB | Facility: HOSPITAL | Age: 68
End: 2023-11-28
Attending: INTERNAL MEDICINE
Payer: MEDICARE

## 2023-11-28 ENCOUNTER — OFFICE VISIT (OUTPATIENT)
Dept: SURGERY | Facility: CLINIC | Age: 68
End: 2023-11-28
Payer: MEDICARE

## 2023-11-28 VITALS
SYSTOLIC BLOOD PRESSURE: 114 MMHG | HEIGHT: 70 IN | TEMPERATURE: 99 F | HEART RATE: 104 BPM | DIASTOLIC BLOOD PRESSURE: 68 MMHG | BODY MASS INDEX: 24.37 KG/M2 | RESPIRATION RATE: 16 BRPM | WEIGHT: 170.19 LBS

## 2023-11-28 DIAGNOSIS — K68.3 NONTRAUMATIC RETROPERITONEAL HEMATOMA: ICD-10-CM

## 2023-11-28 DIAGNOSIS — R31.0 GROSS HEMATURIA: Primary | ICD-10-CM

## 2023-11-28 DIAGNOSIS — R79.1 ABNORMAL COAGULATION PROFILE: ICD-10-CM

## 2023-11-28 PROCEDURE — 3288F FALL RISK ASSESSMENT DOCD: CPT | Mod: CPTII,S$GLB,, | Performed by: SURGERY

## 2023-11-28 PROCEDURE — 85250 CLOT FACTOR IX PTC/CHRSTMAS: CPT | Performed by: INTERNAL MEDICINE

## 2023-11-28 PROCEDURE — 1111F PR DISCHARGE MEDS RECONCILED W/ CURRENT OUTPATIENT MED LIST: ICD-10-PCS | Mod: CPTII,S$GLB,, | Performed by: SURGERY

## 2023-11-28 PROCEDURE — 3008F PR BODY MASS INDEX (BMI) DOCUMENTED: ICD-10-PCS | Mod: CPTII,S$GLB,, | Performed by: SURGERY

## 2023-11-28 PROCEDURE — 99024 PR POST-OP FOLLOW-UP VISIT: ICD-10-PCS | Mod: S$GLB,,, | Performed by: SURGERY

## 2023-11-28 PROCEDURE — 3078F PR MOST RECENT DIASTOLIC BLOOD PRESSURE < 80 MM HG: ICD-10-PCS | Mod: CPTII,S$GLB,, | Performed by: SURGERY

## 2023-11-28 PROCEDURE — 99024 POSTOP FOLLOW-UP VISIT: CPT | Mod: S$GLB,,, | Performed by: SURGERY

## 2023-11-28 PROCEDURE — 85730 THROMBOPLASTIN TIME PARTIAL: CPT | Performed by: INTERNAL MEDICINE

## 2023-11-28 PROCEDURE — 3074F SYST BP LT 130 MM HG: CPT | Mod: CPTII,S$GLB,, | Performed by: SURGERY

## 2023-11-28 PROCEDURE — 99999 PR PBB SHADOW E&M-EST. PATIENT-LVL III: ICD-10-PCS | Mod: PBBFAC,,,

## 2023-11-28 PROCEDURE — 1159F PR MEDICATION LIST DOCUMENTED IN MEDICAL RECORD: ICD-10-PCS | Mod: CPTII,S$GLB,, | Performed by: SURGERY

## 2023-11-28 PROCEDURE — 3074F PR MOST RECENT SYSTOLIC BLOOD PRESSURE < 130 MM HG: ICD-10-PCS | Mod: CPTII,S$GLB,, | Performed by: SURGERY

## 2023-11-28 PROCEDURE — 99999 PR PBB SHADOW E&M-EST. PATIENT-LVL III: CPT | Mod: PBBFAC,,,

## 2023-11-28 PROCEDURE — 36415 COLL VENOUS BLD VENIPUNCTURE: CPT | Performed by: INTERNAL MEDICINE

## 2023-11-28 PROCEDURE — 3078F DIAST BP <80 MM HG: CPT | Mod: CPTII,S$GLB,, | Performed by: SURGERY

## 2023-11-28 PROCEDURE — 3008F BODY MASS INDEX DOCD: CPT | Mod: CPTII,S$GLB,, | Performed by: SURGERY

## 2023-11-28 PROCEDURE — 85240 CLOT FACTOR VIII AHG 1 STAGE: CPT | Performed by: INTERNAL MEDICINE

## 2023-11-28 PROCEDURE — 1101F PT FALLS ASSESS-DOCD LE1/YR: CPT | Mod: CPTII,S$GLB,, | Performed by: SURGERY

## 2023-11-28 PROCEDURE — 1125F AMNT PAIN NOTED PAIN PRSNT: CPT | Mod: CPTII,S$GLB,, | Performed by: SURGERY

## 2023-11-28 PROCEDURE — 1125F PR PAIN SEVERITY QUANTIFIED, PAIN PRESENT: ICD-10-PCS | Mod: CPTII,S$GLB,, | Performed by: SURGERY

## 2023-11-28 PROCEDURE — 1159F MED LIST DOCD IN RCRD: CPT | Mod: CPTII,S$GLB,, | Performed by: SURGERY

## 2023-11-28 PROCEDURE — 3288F PR FALLS RISK ASSESSMENT DOCUMENTED: ICD-10-PCS | Mod: CPTII,S$GLB,, | Performed by: SURGERY

## 2023-11-28 PROCEDURE — 1101F PR PT FALLS ASSESS DOC 0-1 FALLS W/OUT INJ PAST YR: ICD-10-PCS | Mod: CPTII,S$GLB,, | Performed by: SURGERY

## 2023-11-28 PROCEDURE — 1111F DSCHRG MED/CURRENT MED MERGE: CPT | Mod: CPTII,S$GLB,, | Performed by: SURGERY

## 2023-11-28 RX ORDER — TRAMADOL HYDROCHLORIDE 50 MG/1
50 TABLET ORAL EVERY 6 HOURS PRN
Qty: 25 TABLET | Refills: 0 | Status: SHIPPED | OUTPATIENT
Start: 2023-11-28 | End: 2023-12-05

## 2023-11-28 NOTE — PROGRESS NOTES
Doing well overall.  He still has some weakness in his leg on the left side.  He continues to improve despite this.  He noticed some blood in his urine had was treated for UTI.  Wound is healing well no signs of infection    Vitals:    11/28/23 1039   BP: 114/68   Pulse: 104   Resp: 16   Temp: 98.6 °F (37 °C)     Wound well healed was mild dehiscence medially.  No signs of infection.  They're keeping wound clean and covered.    A/p    Hematoma-spontaneous  -keep wound covered as long as it is open, shower over wound daily with soap and water  -hematoma is slowly resolving on its own no signs of infection  -patient is seeing hematology and has some appropriate labs already ordered.    Would consider getting a follow up MRI depending on what hematology workup shows    Hematuria:  Deferred to urology for management

## 2023-11-29 ENCOUNTER — OFFICE VISIT (OUTPATIENT)
Dept: UROLOGY | Facility: CLINIC | Age: 68
End: 2023-11-29
Payer: MEDICARE

## 2023-11-29 ENCOUNTER — TELEPHONE (OUTPATIENT)
Dept: UROLOGY | Facility: CLINIC | Age: 68
End: 2023-11-29

## 2023-11-29 VITALS — HEIGHT: 70 IN | WEIGHT: 170.19 LBS | BODY MASS INDEX: 24.37 KG/M2 | RESPIRATION RATE: 15 BRPM

## 2023-11-29 DIAGNOSIS — R31.0 GROSS HEMATURIA: ICD-10-CM

## 2023-11-29 DIAGNOSIS — R31.0 GROSS HEMATURIA: Primary | ICD-10-CM

## 2023-11-29 PROCEDURE — 3008F PR BODY MASS INDEX (BMI) DOCUMENTED: ICD-10-PCS | Mod: CPTII,,, | Performed by: NURSE PRACTITIONER

## 2023-11-29 PROCEDURE — 1111F DSCHRG MED/CURRENT MED MERGE: CPT | Mod: CPTII,,, | Performed by: NURSE PRACTITIONER

## 2023-11-29 PROCEDURE — 99999 PR PBB SHADOW E&M-EST. PATIENT-LVL III: ICD-10-PCS | Mod: PBBFAC,,, | Performed by: NURSE PRACTITIONER

## 2023-11-29 PROCEDURE — 3288F FALL RISK ASSESSMENT DOCD: CPT | Mod: CPTII,,, | Performed by: NURSE PRACTITIONER

## 2023-11-29 PROCEDURE — 1160F PR REVIEW ALL MEDS BY PRESCRIBER/CLIN PHARMACIST DOCUMENTED: ICD-10-PCS | Mod: CPTII,,, | Performed by: NURSE PRACTITIONER

## 2023-11-29 PROCEDURE — 99214 OFFICE O/P EST MOD 30 MIN: CPT | Mod: ,,, | Performed by: NURSE PRACTITIONER

## 2023-11-29 PROCEDURE — 88112 CYTOPATH CELL ENHANCE TECH: CPT | Performed by: STUDENT IN AN ORGANIZED HEALTH CARE EDUCATION/TRAINING PROGRAM

## 2023-11-29 PROCEDURE — 3008F BODY MASS INDEX DOCD: CPT | Mod: CPTII,,, | Performed by: NURSE PRACTITIONER

## 2023-11-29 PROCEDURE — 87086 URINE CULTURE/COLONY COUNT: CPT | Performed by: NURSE PRACTITIONER

## 2023-11-29 PROCEDURE — 99214 PR OFFICE/OUTPT VISIT, EST, LEVL IV, 30-39 MIN: ICD-10-PCS | Mod: ,,, | Performed by: NURSE PRACTITIONER

## 2023-11-29 PROCEDURE — 1159F PR MEDICATION LIST DOCUMENTED IN MEDICAL RECORD: ICD-10-PCS | Mod: CPTII,,, | Performed by: NURSE PRACTITIONER

## 2023-11-29 PROCEDURE — 1160F RVW MEDS BY RX/DR IN RCRD: CPT | Mod: CPTII,,, | Performed by: NURSE PRACTITIONER

## 2023-11-29 PROCEDURE — 1125F AMNT PAIN NOTED PAIN PRSNT: CPT | Mod: CPTII,,, | Performed by: NURSE PRACTITIONER

## 2023-11-29 PROCEDURE — 88112 PR  CYTOPATH, CELL ENHANCE TECH: ICD-10-PCS | Mod: 26,,, | Performed by: STUDENT IN AN ORGANIZED HEALTH CARE EDUCATION/TRAINING PROGRAM

## 2023-11-29 PROCEDURE — 88112 CYTOPATH CELL ENHANCE TECH: CPT | Mod: 26,,, | Performed by: STUDENT IN AN ORGANIZED HEALTH CARE EDUCATION/TRAINING PROGRAM

## 2023-11-29 PROCEDURE — 1101F PR PT FALLS ASSESS DOC 0-1 FALLS W/OUT INJ PAST YR: ICD-10-PCS | Mod: CPTII,,, | Performed by: NURSE PRACTITIONER

## 2023-11-29 PROCEDURE — 99999 PR PBB SHADOW E&M-EST. PATIENT-LVL III: CPT | Mod: PBBFAC,,, | Performed by: NURSE PRACTITIONER

## 2023-11-29 PROCEDURE — 1111F PR DISCHARGE MEDS RECONCILED W/ CURRENT OUTPATIENT MED LIST: ICD-10-PCS | Mod: CPTII,,, | Performed by: NURSE PRACTITIONER

## 2023-11-29 PROCEDURE — 1159F MED LIST DOCD IN RCRD: CPT | Mod: CPTII,,, | Performed by: NURSE PRACTITIONER

## 2023-11-29 PROCEDURE — 3288F PR FALLS RISK ASSESSMENT DOCUMENTED: ICD-10-PCS | Mod: CPTII,,, | Performed by: NURSE PRACTITIONER

## 2023-11-29 PROCEDURE — 1101F PT FALLS ASSESS-DOCD LE1/YR: CPT | Mod: CPTII,,, | Performed by: NURSE PRACTITIONER

## 2023-11-29 PROCEDURE — 1125F PR PAIN SEVERITY QUANTIFIED, PAIN PRESENT: ICD-10-PCS | Mod: CPTII,,, | Performed by: NURSE PRACTITIONER

## 2023-11-29 NOTE — PROGRESS NOTES
Procedure Order to Urology [[id number]]    Electronically signed by: Margoth Hammond NP on 11/29/23 1025 Status: Active   Ordering user: Margoth Hammond NP 11/29/23 1025 Authorized by: Margoth Hammond NP   Ordering mode: Standard   Frequency:  11/29/23 -     Diagnoses  Gross hematuria [R31.0]   Questionnaire    Question Answer   Procedure Cystoscopy Comment - 12/27 Soham   Facility Name: [facility], Please order Urine POCT without micro . Local sedation

## 2023-11-29 NOTE — H&P (VIEW-ONLY)
CHIEF COMPLAINT:    Mr. Espino is a 68 y.o. male presenting for gross hematuria  PRESENTING ILLNESS:    Jos Espino is a 68 y.o. male who presents for gross hematuria. Last clinic visit was 6/5/23 with Dr Rousseau    Patient with a history of HLD who has a several year history of bothersome lower urinary tract symptoms that have been progressively worsening.      He reports weak stream, frequency, incomplete emptying, intermittency, straining and nocturia x 2. He has been on Flomax for numerous years with no significant improvement. Drinks mostly water throughout the day.      He underwent attempted Urolift of an enlarged median lobe with Dr. Gruber in [place] on 1/14/22. Per the op note, he was able to place one implant in the median lobe that did not appear to be placed well. Procedure aborted. Here reporting that his symptoms have persisted and he is bothered.      UA negative on 1/20/22.      He also has moderate erectile dysfunction. He tried Viagra, but discontinued as his wife was not satisfied. Not bothered by his erectile function.      Two brothers with prostate cancer. Works as a .        Interval History     6/3/22: He is now s/p uncomplicated TURP on 4/7/22. Path benign. Discharged home on POD 1. States he has been voiding well. IPSS improved to 4 from 17 pre-operatively. PVR 0 today. He has not discontinued Flomax.     6/5/23: Here today for follow up. States that he is continuing to do well. IPSS 1. PVR 0 mL. He has discontinued Flomax. No complaints.  He also reports mild erectile dysfunction. Has trouble obtaining an erection intermittently. Has not tried any medication. Has tried Viagra several years ago, but stopped as he had side effects.      Denies any fever, chills, flank pain, gross hematuria, bone pain, unintentional weight loss     11/19/23: pt presents for gross hematuria x 1 week. He had UA completed 11/18/23 at rehab which resulted lg blood, 30 protein, 10-15 WBC,  RBC,  urine culture no growth. He was treated with antibiotics x 1 week for UTI. Today he continues to have gross hematuria. Denies dysuria, flank pain, fever, chills, nausea or vomiting associated with hematuria. Good urinary stream. No difficulty voiding. Denies frequency or urgency. Very small blood clots but not causing any issues starting stream. He is drinking plenty of water.    Not on blood thinners.    No hx of kidney stones  11/2/23 CT abd pelvis with contrast: Kidneys and Ureters: Normal size and contour. No hydronephrosis. Bladder: Normal in appearance.    Pt had spontaneous retroperitoneal hematoma recently and being evaluated by Dr Khoobehi with hemonc.      PSA  1.1  6/5/23  1.03                 12/6/21     IPSS    QoL  1          0          6/5/23  4          1          6/3/22  17        4          3/10/22     PVR  0 mL                6/5/23  0 mL                6/3/22  76 mL              3/10/22      REVIEW OF SYSTEMS:    Review of Systems    Constitutional: Negative for fever and chills.   HENT: Negative for hearing loss.   Eyes: Negative for visual disturbance.   Respiratory: Negative for shortness of breath.   Cardiovascular: Negative for chest pain.   Gastrointestinal: Negative for nausea, vomiting  Genitourinary:  See above  Neurological: Negative for dizziness.   Hematological: Does not bruise/bleed easily.   Psychiatric/Behavioral: Negative for confusion.       PATIENT HISTORY:    Past Medical History:   Diagnosis Date    Hyperlipidemia        Past Surgical History:   Procedure Laterality Date    INCISION AND DRAINAGE OF HEMATOMA Left 11/1/2023    Procedure: INCISION AND DRAINAGE, HEMATOMA;  Surgeon: Melba Farias MD;  Location: [facility] OR;  Service: General;  Laterality: Left;    TRANSURETHRAL RESECTION OF PROSTATE N/A 4/7/2022    Procedure: TURP (TRANSURETHRAL RESECTION OF PROSTATE);  Surgeon: Ricky Rousseau Jr., MD;  Location: [facility] OR;  Service: Urology;  Laterality: N/A;       Family History    Problem Relation Age of Onset    Hypertension Brother     Cancer Brother         prostate       Social History     Socioeconomic History    Marital status:    Occupational History    Occupation:      Employer:  [employer]   Tobacco Use    Smoking status: Never    Smokeless tobacco: Never   Substance and Sexual Activity    Alcohol use: Yes     Comment: seldom    Drug use: No    Sexual activity: Yes     Partners: Female     Birth control/protection: None     Social Determinants of Health     Financial Resource Strain: Low Risk  (11/15/2023)    Overall Financial Resource Strain (CARDIA)     Difficulty of Paying Living Expenses: Not hard at all   Food Insecurity: No Food Insecurity (11/15/2023)    Hunger Vital Sign     Worried About Running Out of Food in the Last Year: Never true     Ran Out of Food in the Last Year: Never true   Transportation Needs: No Transportation Needs (11/15/2023)    PRAPARE - Transportation     Lack of Transportation (Medical): No     Lack of Transportation (Non-Medical): No   Physical Activity: Unknown (11/15/2023)    Exercise Vital Sign     Days of Exercise per Week: 0 days   Stress: No Stress Concern Present (11/15/2023)    [employer] - Occupational Stress Questionnaire     Feeling of Stress : Not at all   Social Connections: Unknown (11/15/2023)    Social Connection and Isolation Panel [NHANES]     Frequency of Communication with Friends and Family: More than three times a week     Frequency of Social Gatherings with Friends and Family: Twice a week     Active Member of Clubs or Organizations: Yes     Attends Club or Organization Meetings: Patient refused     Marital Status:    Housing Stability: Low Risk  (11/15/2023)    Housing Stability Vital Sign     Unable to Pay for Housing in the Last Year: No     Number of Places Lived in the Last Year: 1     Unstable Housing in the Last Year: No       Allergies:  Patient has no known  allergies.    Medications:    Current Outpatient Medications:     atorvastatin (LIPITOR) 10 MG tablet, Take 10 mg by mouth once daily., Disp: , Rfl:     multivit-min-FA-lycopen-lutein (CENTRUM SILVER) 0.4 mg-300 mcg- 250 mcg Tab, Take 1 tablet by mouth once daily., Disp: , Rfl:     tadalafiL (CIALIS) 5 MG tablet, Take 1 tablet (5 mg total) by mouth daily as needed for Erectile Dysfunction., Disp: 30 tablet, Rfl: 11    traMADoL (ULTRAM) 50 mg tablet, Take 1 tablet (50 mg total) by mouth every 6 (six) hours as needed for Pain., Disp: 25 tablet, Rfl: 0    PHYSICAL EXAMINATION:    Constitutional: He is oriented to person, place, and time. He appears well-developed and well-nourished.  He is in no apparent distress.    Neck: Normal ROM.     Cardiovascular: Normal rate.      Pulmonary/Chest: Effort normal. No respiratory distress.     Abdominal:  He exhibits no distension.  There is no CVA tenderness.     Neurological: He is alert and oriented to person, place, and time.     Skin: Skin is warm and dry.     Psych: Cooperative with normal affect.    Physical Exam    LABS:    Lab Results   Component Value Date    PSA 1.1 06/05/2023     Lab Results   Component Value Date    CREATININE 1.0 11/09/2023       IMPRESSION:    Encounter Diagnoses   Name Primary?    Gross hematuria        PLAN:  -unable to complete UA today, gross hematuria  Urine sent for culture and cytology  Will call pt with results and treat based on results    -CT completed 11/2/23    -Message sent to Dr Rousseau regarding cysto    -RTC based on results    I encouraged him or any of his family members to call or email me with questions and/or concerns.      30 minutes of total time spent on the encounter, which includes face to face time and non-face to face time preparing to see the patient (eg, review of tests), Obtaining and/or reviewing separately obtained history, Documenting clinical information in the electronic or other health record, Independently  interpreting results (not separately reported) and communicating results to the patient/family/caregiver, or Care coordination (not separately reported).

## 2023-11-29 NOTE — TELEPHONE ENCOUNTER
Dr Rousseau agrees to cysto 12/27/23  No imaging needed since had CT 11/2/23    Pt agrees to date  Order placed for cysto   Pt will receive a call the day prior with arrival time for cysto by [facility]SU

## 2023-11-29 NOTE — PROGRESS NOTES
CHIEF COMPLAINT:    Mr. Espino is a 68 y.o. male presenting for gross hematuria  PRESENTING ILLNESS:    Jos Espino is a 68 y.o. male who presents for gross hematuria. Last clinic visit was 6/5/23 with Dr Rousseau    Patient with a history of HLD who has a several year history of bothersome lower urinary tract symptoms that have been progressively worsening.      He reports weak stream, frequency, incomplete emptying, intermittency, straining and nocturia x 2. He has been on Flomax for numerous years with no significant improvement. Drinks mostly water throughout the day.      He underwent attempted Urolift of an enlarged median lobe with Dr. Gruber in [place] on 1/14/22. Per the op note, he was able to place one implant in the median lobe that did not appear to be placed well. Procedure aborted. Here reporting that his symptoms have persisted and he is bothered.      UA negative on 1/20/22.      He also has moderate erectile dysfunction. He tried Viagra, but discontinued as his wife was not satisfied. Not bothered by his erectile function.      Two brothers with prostate cancer. Works as a .        Interval History     6/3/22: He is now s/p uncomplicated TURP on 4/7/22. Path benign. Discharged home on POD 1. States he has been voiding well. IPSS improved to 4 from 17 pre-operatively. PVR 0 today. He has not discontinued Flomax.     6/5/23: Here today for follow up. States that he is continuing to do well. IPSS 1. PVR 0 mL. He has discontinued Flomax. No complaints.  He also reports mild erectile dysfunction. Has trouble obtaining an erection intermittently. Has not tried any medication. Has tried Viagra several years ago, but stopped as he had side effects.      Denies any fever, chills, flank pain, gross hematuria, bone pain, unintentional weight loss     11/19/23: pt presents for gross hematuria x 1 week. He had UA completed 11/18/23 at rehab which resulted lg blood, 30 protein, 10-15 WBC,  RBC,  urine culture no growth. He was treated with antibiotics x 1 week for UTI. Today he continues to have gross hematuria. Denies dysuria, flank pain, fever, chills, nausea or vomiting associated with hematuria. Good urinary stream. No difficulty voiding. Denies frequency or urgency. Very small blood clots but not causing any issues starting stream. He is drinking plenty of water.    Not on blood thinners.    No hx of kidney stones  11/2/23 CT abd pelvis with contrast: Kidneys and Ureters: Normal size and contour. No hydronephrosis. Bladder: Normal in appearance.    Pt had spontaneous retroperitoneal hematoma recently and being evaluated by Dr Khoobehi with hemonc.      PSA  1.1  6/5/23  1.03                 12/6/21     IPSS    QoL  1          0          6/5/23  4          1          6/3/22  17        4          3/10/22     PVR  0 mL                6/5/23  0 mL                6/3/22  76 mL              3/10/22      REVIEW OF SYSTEMS:    Review of Systems    Constitutional: Negative for fever and chills.   HENT: Negative for hearing loss.   Eyes: Negative for visual disturbance.   Respiratory: Negative for shortness of breath.   Cardiovascular: Negative for chest pain.   Gastrointestinal: Negative for nausea, vomiting  Genitourinary:  See above  Neurological: Negative for dizziness.   Hematological: Does not bruise/bleed easily.   Psychiatric/Behavioral: Negative for confusion.       PATIENT HISTORY:    Past Medical History:   Diagnosis Date    Hyperlipidemia        Past Surgical History:   Procedure Laterality Date    INCISION AND DRAINAGE OF HEMATOMA Left 11/1/2023    Procedure: INCISION AND DRAINAGE, HEMATOMA;  Surgeon: Melba Farias MD;  Location: [facility] OR;  Service: General;  Laterality: Left;    TRANSURETHRAL RESECTION OF PROSTATE N/A 4/7/2022    Procedure: TURP (TRANSURETHRAL RESECTION OF PROSTATE);  Surgeon: Ricky Rousseau Jr., MD;  Location: [facility] OR;  Service: Urology;  Laterality: N/A;       Family History    Problem Relation Age of Onset    Hypertension Brother     Cancer Brother         prostate       Social History     Socioeconomic History    Marital status:    Occupational History    Occupation:      Employer:  [employer]   Tobacco Use    Smoking status: Never    Smokeless tobacco: Never   Substance and Sexual Activity    Alcohol use: Yes     Comment: seldom    Drug use: No    Sexual activity: Yes     Partners: Female     Birth control/protection: None     Social Determinants of Health     Financial Resource Strain: Low Risk  (11/15/2023)    Overall Financial Resource Strain (CARDIA)     Difficulty of Paying Living Expenses: Not hard at all   Food Insecurity: No Food Insecurity (11/15/2023)    Hunger Vital Sign     Worried About Running Out of Food in the Last Year: Never true     Ran Out of Food in the Last Year: Never true   Transportation Needs: No Transportation Needs (11/15/2023)    PRAPARE - Transportation     Lack of Transportation (Medical): No     Lack of Transportation (Non-Medical): No   Physical Activity: Unknown (11/15/2023)    Exercise Vital Sign     Days of Exercise per Week: 0 days   Stress: No Stress Concern Present (11/15/2023)    [employer] - Occupational Stress Questionnaire     Feeling of Stress : Not at all   Social Connections: Unknown (11/15/2023)    Social Connection and Isolation Panel [NHANES]     Frequency of Communication with Friends and Family: More than three times a week     Frequency of Social Gatherings with Friends and Family: Twice a week     Active Member of Clubs or Organizations: Yes     Attends Club or Organization Meetings: Patient refused     Marital Status:    Housing Stability: Low Risk  (11/15/2023)    Housing Stability Vital Sign     Unable to Pay for Housing in the Last Year: No     Number of Places Lived in the Last Year: 1     Unstable Housing in the Last Year: No       Allergies:  Patient has no known  allergies.    Medications:    Current Outpatient Medications:     atorvastatin (LIPITOR) 10 MG tablet, Take 10 mg by mouth once daily., Disp: , Rfl:     multivit-min-FA-lycopen-lutein (CENTRUM SILVER) 0.4 mg-300 mcg- 250 mcg Tab, Take 1 tablet by mouth once daily., Disp: , Rfl:     tadalafiL (CIALIS) 5 MG tablet, Take 1 tablet (5 mg total) by mouth daily as needed for Erectile Dysfunction., Disp: 30 tablet, Rfl: 11    traMADoL (ULTRAM) 50 mg tablet, Take 1 tablet (50 mg total) by mouth every 6 (six) hours as needed for Pain., Disp: 25 tablet, Rfl: 0    PHYSICAL EXAMINATION:    Constitutional: He is oriented to person, place, and time. He appears well-developed and well-nourished.  He is in no apparent distress.    Neck: Normal ROM.     Cardiovascular: Normal rate.      Pulmonary/Chest: Effort normal. No respiratory distress.     Abdominal:  He exhibits no distension.  There is no CVA tenderness.     Neurological: He is alert and oriented to person, place, and time.     Skin: Skin is warm and dry.     Psych: Cooperative with normal affect.    Physical Exam    LABS:    Lab Results   Component Value Date    PSA 1.1 06/05/2023     Lab Results   Component Value Date    CREATININE 1.0 11/09/2023       IMPRESSION:    Encounter Diagnoses   Name Primary?    Gross hematuria        PLAN:  -unable to complete UA today, gross hematuria  Urine sent for culture and cytology  Will call pt with results and treat based on results    -CT completed 11/2/23    -Message sent to Dr Rousseau regarding cysto    -RTC based on results    I encouraged him or any of his family members to call or email me with questions and/or concerns.      30 minutes of total time spent on the encounter, which includes face to face time and non-face to face time preparing to see the patient (eg, review of tests), Obtaining and/or reviewing separately obtained history, Documenting clinical information in the electronic or other health record, Independently  interpreting results (not separately reported) and communicating results to the patient/family/caregiver, or Care coordination (not separately reported).

## 2023-11-30 LAB
BACTERIA UR CULT: NORMAL
FINAL PATHOLOGIC DIAGNOSIS: NORMAL
Lab: NORMAL
MICROSCOPIC EXAM: NORMAL

## 2023-12-05 ENCOUNTER — PATIENT MESSAGE (OUTPATIENT)
Dept: HEMATOLOGY/ONCOLOGY | Facility: CLINIC | Age: 68
End: 2023-12-05
Payer: MEDICARE

## 2023-12-05 DIAGNOSIS — R79.1 ABNORMAL COAGULATION PROFILE: Primary | ICD-10-CM

## 2023-12-05 LAB
APTT 1H NP CONT PPP: 32.8 SEC (ref 22.9–30.2)
APTT 1H NP PPP: 41.3 SEC (ref 22.9–30.2)
APTT IMM NP PPP: 31.1 SEC (ref 22.9–30.2)
APTT PPP 1:1 SALINE: 80 SEC
APTT PPP: 55.9 SEC (ref 22.9–30.2)
FACT IX ACT/NOR PPP: 133 % (ref 60–177)
FACT VIII ACT/NOR PPP: 1 % (ref 56–140)

## 2023-12-06 ENCOUNTER — PATIENT MESSAGE (OUTPATIENT)
Dept: HEMATOLOGY/ONCOLOGY | Facility: CLINIC | Age: 68
End: 2023-12-06
Payer: MEDICARE

## 2023-12-06 ENCOUNTER — LAB VISIT (OUTPATIENT)
Dept: LAB | Facility: HOSPITAL | Age: 68
End: 2023-12-06
Attending: INTERNAL MEDICINE
Payer: MEDICARE

## 2023-12-06 DIAGNOSIS — R79.1 ABNORMAL COAGULATION PROFILE: ICD-10-CM

## 2023-12-06 PROCEDURE — 85730 THROMBOPLASTIN TIME PARTIAL: CPT | Performed by: INTERNAL MEDICINE

## 2023-12-06 PROCEDURE — 36415 COLL VENOUS BLD VENIPUNCTURE: CPT | Performed by: INTERNAL MEDICINE

## 2023-12-11 ENCOUNTER — OFFICE VISIT (OUTPATIENT)
Dept: HEMATOLOGY/ONCOLOGY | Facility: CLINIC | Age: 68
End: 2023-12-11
Payer: MEDICARE

## 2023-12-11 VITALS
SYSTOLIC BLOOD PRESSURE: 131 MMHG | OXYGEN SATURATION: 100 % | BODY MASS INDEX: 24.08 KG/M2 | TEMPERATURE: 97 F | DIASTOLIC BLOOD PRESSURE: 74 MMHG | RESPIRATION RATE: 18 BRPM | WEIGHT: 168.19 LBS | HEART RATE: 96 BPM | HEIGHT: 70 IN

## 2023-12-11 DIAGNOSIS — D68.4 ACQUIRED FACTOR VIII DEFICIENCY: Primary | ICD-10-CM

## 2023-12-11 PROCEDURE — 3078F PR MOST RECENT DIASTOLIC BLOOD PRESSURE < 80 MM HG: ICD-10-PCS | Mod: CPTII,S$GLB,, | Performed by: INTERNAL MEDICINE

## 2023-12-11 PROCEDURE — 3288F PR FALLS RISK ASSESSMENT DOCUMENTED: ICD-10-PCS | Mod: CPTII,S$GLB,, | Performed by: INTERNAL MEDICINE

## 2023-12-11 PROCEDURE — 3075F PR MOST RECENT SYSTOLIC BLOOD PRESS GE 130-139MM HG: ICD-10-PCS | Mod: CPTII,S$GLB,, | Performed by: INTERNAL MEDICINE

## 2023-12-11 PROCEDURE — 99999 PR PBB SHADOW E&M-EST. PATIENT-LVL III: ICD-10-PCS | Mod: PBBFAC,,, | Performed by: INTERNAL MEDICINE

## 2023-12-11 PROCEDURE — 3008F BODY MASS INDEX DOCD: CPT | Mod: CPTII,S$GLB,, | Performed by: INTERNAL MEDICINE

## 2023-12-11 PROCEDURE — 1101F PT FALLS ASSESS-DOCD LE1/YR: CPT | Mod: CPTII,S$GLB,, | Performed by: INTERNAL MEDICINE

## 2023-12-11 PROCEDURE — 99214 OFFICE O/P EST MOD 30 MIN: CPT | Mod: S$GLB,,, | Performed by: INTERNAL MEDICINE

## 2023-12-11 PROCEDURE — 1126F AMNT PAIN NOTED NONE PRSNT: CPT | Mod: CPTII,S$GLB,, | Performed by: INTERNAL MEDICINE

## 2023-12-11 PROCEDURE — 3078F DIAST BP <80 MM HG: CPT | Mod: CPTII,S$GLB,, | Performed by: INTERNAL MEDICINE

## 2023-12-11 PROCEDURE — 3288F FALL RISK ASSESSMENT DOCD: CPT | Mod: CPTII,S$GLB,, | Performed by: INTERNAL MEDICINE

## 2023-12-11 PROCEDURE — 1101F PR PT FALLS ASSESS DOC 0-1 FALLS W/OUT INJ PAST YR: ICD-10-PCS | Mod: CPTII,S$GLB,, | Performed by: INTERNAL MEDICINE

## 2023-12-11 PROCEDURE — 3008F PR BODY MASS INDEX (BMI) DOCUMENTED: ICD-10-PCS | Mod: CPTII,S$GLB,, | Performed by: INTERNAL MEDICINE

## 2023-12-11 PROCEDURE — 99999 PR PBB SHADOW E&M-EST. PATIENT-LVL III: CPT | Mod: PBBFAC,,, | Performed by: INTERNAL MEDICINE

## 2023-12-11 PROCEDURE — 99214 PR OFFICE/OUTPT VISIT, EST, LEVL IV, 30-39 MIN: ICD-10-PCS | Mod: S$GLB,,, | Performed by: INTERNAL MEDICINE

## 2023-12-11 PROCEDURE — 3075F SYST BP GE 130 - 139MM HG: CPT | Mod: CPTII,S$GLB,, | Performed by: INTERNAL MEDICINE

## 2023-12-11 PROCEDURE — 1126F PR PAIN SEVERITY QUANTIFIED, NO PAIN PRESENT: ICD-10-PCS | Mod: CPTII,S$GLB,, | Performed by: INTERNAL MEDICINE

## 2023-12-11 RX ORDER — PREDNISONE 20 MG/1
80 TABLET ORAL DAILY
Qty: 120 TABLET | Refills: 0 | Status: SHIPPED | OUTPATIENT
Start: 2023-12-11 | End: 2024-01-05 | Stop reason: SDUPTHER

## 2023-12-11 RX ORDER — PANTOPRAZOLE SODIUM 20 MG/1
20 TABLET, DELAYED RELEASE ORAL DAILY
Qty: 30 TABLET | Refills: 1 | Status: SHIPPED | OUTPATIENT
Start: 2023-12-11 | End: 2024-12-10

## 2023-12-11 RX ORDER — SULFAMETHOXAZOLE AND TRIMETHOPRIM 400; 80 MG/1; MG/1
1 TABLET ORAL DAILY
Qty: 30 TABLET | Refills: 1 | Status: SHIPPED | OUTPATIENT
Start: 2023-12-11 | End: 2024-02-09

## 2023-12-17 ENCOUNTER — PATIENT MESSAGE (OUTPATIENT)
Dept: UROLOGY | Facility: CLINIC | Age: 68
End: 2023-12-17
Payer: MEDICARE

## 2023-12-27 ENCOUNTER — HOSPITAL ENCOUNTER (OUTPATIENT)
Facility: HOSPITAL | Age: 68
Discharge: HOME | End: 2023-12-27
Attending: UROLOGY | Admitting: UROLOGY
Payer: MEDICARE

## 2023-12-27 DIAGNOSIS — R31.0 GROSS HEMATURIA: Primary | ICD-10-CM

## 2023-12-27 LAB
APTT 1H NP CONT PPP: ABNORMAL S
APTT 1H NP PPP: 38 SEC (ref 22.9–30.2)
APTT IMM NP PPP: 29.7 SEC (ref 22.9–30.2)
APTT PPP 1:1 SALINE: ABNORMAL S
APTT PPP: 51.2 SEC (ref 22.9–30.2)
FACT VIII ACT/NOR PPP: 2 % (ref 56–140)

## 2023-12-27 PROCEDURE — 52000 CYSTOURETHROSCOPY: CPT | Performed by: UROLOGY

## 2023-12-27 PROCEDURE — 52000 CYSTOURETHROSCOPY: CPT | Mod: ,,, | Performed by: UROLOGY

## 2023-12-27 PROCEDURE — 52000 PR CYSTOURETHROSCOPY: ICD-10-PCS | Mod: ,,, | Performed by: UROLOGY

## 2023-12-27 PROCEDURE — A4217 STERILE WATER/SALINE, 500 ML: HCPCS | Performed by: UROLOGY

## 2023-12-27 PROCEDURE — 25000003 PHARM REV CODE 250: Performed by: UROLOGY

## 2023-12-27 RX ORDER — LIDOCAINE HYDROCHLORIDE 20 MG/ML
JELLY TOPICAL
Status: DISCONTINUED | OUTPATIENT
Start: 2023-12-27 | End: 2023-12-27 | Stop reason: HOSPADM

## 2023-12-27 RX ORDER — WATER 1 ML/ML
IRRIGANT IRRIGATION
Status: DISCONTINUED | OUTPATIENT
Start: 2023-12-27 | End: 2023-12-27 | Stop reason: HOSPADM

## 2023-12-27 NOTE — OP NOTE
[facility]  Operative/Discharge Note    Date: 12/27/2023    Pre-Op Diagnosis: Gross hematuria    Post-Op Diagnosis: Same    Procedure(s) Performed:   1.  Cystoscopy     Specimen(s): None    Staff Surgeon: Ricky Rousseau MD    Assistant Surgeon: Ricky Rousseau Jr, MD    Anesthesia: Local anesthesia topical 2% lidocaine gel    Indications: Jos Espino is a 68 y.o. male with gross hematuria.     Findings: Unremarkable cystoscopy. Prostate non-obstructing consistent with previous TURP.     Estimated Blood Loss: min    Drains: None    Procedure in Detail:  After risks, benefits and possible complications of cystoscopy were explained, the patient elected to undergo the procedure and informed consent was obtained. All questions were answered in the peggy-operative area. The patient was transferred to the cystoscopy suite and placed in the lithotomy position.  The patient was prepped and draped in the usual sterile fashion. Lidocaine jelly was administered for local anesthesia. Time out was performed.    A flexible cystoscope was introduced into the bladder per urethra. This passed easily.  The entire urethra was visualized which showed no masses or strictures.  The prostate was 2 cm in length and appeared non-obstructing with TURP defect. The right and left ureteral orifices were identified in the normal anatomic position and were seen effluxing clear urine.  Formal cystoscopy was performed which revealed no masses or lesions suspicious for malignancy, mild trabeculations, no bladder stones and no bladder diverticula.      The patient tolerated the procedure well and was transferred to recovery in stable condition.    Disposition: Home    Discharge home today status post uncomplicated procedure as above  Diet - resume home diet  Follow up: Hematuria work up unremarkable. RTC in 1 year with symptom score and PVR.   Instructions: Continue home medication.   Meds:     Medication List        CONTINUE taking these medications       atorvastatin 10 MG tablet  Commonly known as: LIPITOR     CENTRUM SILVER 0.4 mg-300 mcg- 250 mcg Tab  Generic drug: multivit-min-FA-lycopen-lutein     pantoprazole 20 MG tablet  Commonly known as: PROTONIX  Take 1 tablet (20 mg total) by mouth once daily.     predniSONE 20 MG tablet  Commonly known as: DELTASONE  Take 4 tablets (80 mg total) by mouth once daily.     sulfamethoxazole-trimethoprim 400-80mg 400-80 mg per tablet  Commonly known as: BACTRIM  Take 1 tablet by mouth once daily.     tadalafiL 5 MG tablet  Commonly known as: CIALIS  Take 1 tablet (5 mg total) by mouth daily as needed for Erectile Dysfunction.              Ricky Rousseau Jr, MD

## 2023-12-28 VITALS
OXYGEN SATURATION: 97 % | WEIGHT: 168.19 LBS | BODY MASS INDEX: 24.08 KG/M2 | RESPIRATION RATE: 18 BRPM | DIASTOLIC BLOOD PRESSURE: 87 MMHG | TEMPERATURE: 98 F | SYSTOLIC BLOOD PRESSURE: 179 MMHG | HEART RATE: 68 BPM | HEIGHT: 70 IN

## 2023-12-30 ENCOUNTER — LAB VISIT (OUTPATIENT)
Dept: LAB | Facility: HOSPITAL | Age: 68
End: 2023-12-30
Attending: INTERNAL MEDICINE
Payer: MEDICARE

## 2023-12-30 DIAGNOSIS — D68.4 ACQUIRED FACTOR VIII DEFICIENCY: ICD-10-CM

## 2023-12-30 PROCEDURE — 85732 THROMBOPLASTIN TIME PARTIAL: CPT | Performed by: INTERNAL MEDICINE

## 2023-12-30 PROCEDURE — 85240 CLOT FACTOR VIII AHG 1 STAGE: CPT | Performed by: INTERNAL MEDICINE

## 2023-12-30 PROCEDURE — 36415 COLL VENOUS BLD VENIPUNCTURE: CPT | Performed by: INTERNAL MEDICINE

## 2023-12-31 NOTE — PROGRESS NOTES
Service Date:  12/11/23    Chief Complaint: nontraumatic retroperitonaeal hematoma  (Labs  fu)    Jos Espino is a 68 y.o. male recently hospitalized with a spontaneous retroperitoneal hematoma. He was not on any blood thinners at the time.   He did not suffer any traumatic event.  States he goes to gym and does lift weights.  He had evacuation of the hematoma as he was having some neurapraxia.  Hematoma then returned but his ability to walk was preserved.  He also had some bleeding issues with his sutures with removal.  He states he is never had bleeding issues prior.  He has never had any problems with prolonged bleeding from shaving or brushing his teeth.    Recent workup shows that he has Factor VII inhibitor. Here to discuss those results. Continues to have some oozing    Review of Systems   Constitutional: Negative.    HENT: Negative.     Eyes: Negative.    Respiratory: Negative.     Cardiovascular: Negative.    Gastrointestinal: Negative.    Endocrine: Negative.    Genitourinary: Negative.    Musculoskeletal: Negative.    Integumentary:  Negative.   Neurological: Negative.    Hematological: Negative.    Psychiatric/Behavioral: Negative.          Current Outpatient Medications   Medication Instructions    atorvastatin (LIPITOR) 10 mg, Oral, Daily    multivit-min-FA-lycopen-lutein (CENTRUM SILVER) 0.4 mg-300 mcg- 250 mcg Tab 1 tablet, Oral, Daily    pantoprazole (PROTONIX) 20 mg, Oral, Daily    predniSONE (DELTASONE) 80 mg, Oral, Daily    sulfamethoxazole-trimethoprim 400-80mg (BACTRIM) 400-80 mg per tablet 1 tablet, Oral, Daily    tadalafiL (CIALIS) 5 mg, Oral, Daily PRN        Past Medical History:   Diagnosis Date    Hyperlipidemia         Past Surgical History:   Procedure Laterality Date    CYSTOSCOPY N/A 12/27/2023    Procedure: CYSTOSCOPY;  Surgeon: Ricky Rousseau Jr., MD;  Location: [facility] OR;  Service: Urology;  Laterality: N/A;    INCISION AND DRAINAGE OF HEMATOMA Left 11/1/2023    Procedure:  "INCISION AND DRAINAGE, HEMATOMA;  Surgeon: Melba Farias MD;  Location: [facility] OR;  Service: General;  Laterality: Left;    TRANSURETHRAL RESECTION OF PROSTATE N/A 4/7/2022    Procedure: TURP (TRANSURETHRAL RESECTION OF PROSTATE);  Surgeon: Ricky Rousseau Jr., MD;  Location: [facility] OR;  Service: Urology;  Laterality: N/A;        Family History   Problem Relation Age of Onset    Hypertension Brother     Cancer Brother         prostate       Social History     Tobacco Use    Smoking status: Never    Smokeless tobacco: Never   Substance Use Topics    Alcohol use: Yes     Comment: seldom    Drug use: No         Vitals:    12/11/23 1054   BP: 131/74   Pulse: 96   Resp: 18   Temp: 97.3 °F (36.3 °C)        Physical Exam:  /74 (BP Location: Left arm, Patient Position: Sitting, BP Method: Large (Automatic))   Pulse 96   Temp 97.3 °F (36.3 °C) (Temporal)   Resp 18   Ht 5' 10" (1.778 m)   Wt 76.3 kg (168 lb 3.4 oz)   SpO2 100%   BMI 24.14 kg/m²     Physical Exam  Vitals and nursing note reviewed.   Constitutional:       Appearance: Normal appearance.   HENT:      Head: Normocephalic and atraumatic.      Nose: Nose normal.      Mouth/Throat:      Mouth: Mucous membranes are moist.      Pharynx: Oropharynx is clear.   Eyes:      Extraocular Movements: Extraocular movements intact.      Conjunctiva/sclera: Conjunctivae normal.   Cardiovascular:      Rate and Rhythm: Normal rate and regular rhythm.      Heart sounds: Normal heart sounds.   Pulmonary:      Effort: Pulmonary effort is normal.      Breath sounds: Normal breath sounds.   Abdominal:      General: Abdomen is flat. Bowel sounds are normal.      Palpations: Abdomen is soft.   Musculoskeletal:         General: Normal range of motion.      Cervical back: Normal range of motion and neck supple.   Skin:     General: Skin is warm and dry.   Neurological:      General: No focal deficit present.      Mental Status: He is alert and oriented to person, place, and " time. Mental status is at baseline.   Psychiatric:         Mood and Affect: Mood normal.          Labs:  Lab Results   Component Value Date    WBC 12.60 11/09/2023    RBC 3.24 (L) 11/09/2023    HGB 8.8 (L) 11/09/2023    HCT 26.6 (L) 11/09/2023    MCV 82 11/09/2023    MCH 27.2 11/09/2023    MCHC 33.1 11/09/2023    RDW 14.5 11/09/2023     (H) 11/09/2023    MPV 11.2 11/09/2023    GRAN 7.7 11/09/2023    GRAN 60.9 11/09/2023    LYMPH 2.1 11/09/2023    LYMPH 16.7 (L) 11/09/2023    MONO 1.9 (H) 11/09/2023    MONO 14.8 11/09/2023    EOS 0.6 (H) 11/09/2023    BASO 0.13 11/09/2023    EOSINOPHIL 4.5 11/09/2023    BASOPHIL 1.0 11/09/2023     Sodium   Date Value Ref Range Status   11/09/2023 136 136 - 145 mmol/L Final     Potassium   Date Value Ref Range Status   11/09/2023 5.1 3.5 - 5.1 mmol/L Final     Chloride   Date Value Ref Range Status   11/09/2023 104 95 - 110 mmol/L Final     CO2   Date Value Ref Range Status   11/09/2023 26 23 - 29 mmol/L Final     Glucose   Date Value Ref Range Status   11/09/2023 111 (H) 70 - 110 mg/dL Final     BUN   Date Value Ref Range Status   11/09/2023 15 8 - 23 mg/dL Final     Creatinine   Date Value Ref Range Status   11/09/2023 1.0 0.5 - 1.4 mg/dL Final     Calcium   Date Value Ref Range Status   11/09/2023 9.9 8.7 - 10.5 mg/dL Final     Total Protein   Date Value Ref Range Status   11/09/2023 7.2 6.0 - 8.4 g/dL Final     Albumin   Date Value Ref Range Status   11/09/2023 3.5 3.5 - 5.2 g/dL Final     Total Bilirubin   Date Value Ref Range Status   11/09/2023 1.1 (H) 0.1 - 1.0 mg/dL Final     Comment:     For infants and newborns, interpretation of results should be based  on gestational age, weight and in agreement with clinical  observations.    Premature Infant recommended reference ranges:  Up to 24 hours.............<8.0 mg/dL  Up to 48 hours............<12.0 mg/dL  3-5 days..................<15.0 mg/dL  6-29 days.................<15.0 mg/dL       Alkaline Phosphatase   Date Value  Ref Range Status   11/09/2023 76 55 - 135 U/L Final     AST   Date Value Ref Range Status   11/09/2023 55 (H) 10 - 40 U/L Final     ALT   Date Value Ref Range Status   11/09/2023 73 (H) 10 - 44 U/L Final     Anion Gap   Date Value Ref Range Status   11/09/2023 6 (L) 8 - 16 mmol/L Final     eGFR if    Date Value Ref Range Status   04/07/2022 >60 >60 mL/min/1.73 m^2 Final     eGFR if non    Date Value Ref Range Status   04/07/2022 >60 >60 mL/min/1.73 m^2 Final     Comment:     Calculation used to obtain the estimated glomerular filtration  rate (eGFR) is the CKD-EPI equation.          A/P:    Hematoma   Prolonged bleeding   -hs factor VII inhibitor  -due to ongoing slow bleeding, will treat with steroid taper  -RTC in a couple of weeks with lab work to check response to treatment.      Aurash Khoobehi, MD  Hematology and Oncology

## 2024-01-04 LAB
APTT 1H NP PPP: ABNORMAL S
APTT IMM NP PPP: ABNORMAL S
APTT PPP 1:1 SALINE: ABNORMAL S
APTT PPP: 24 SEC (ref 22.9–30.2)
FACT VIII ACT/NOR PPP: 233 % (ref 56–140)

## 2024-01-05 ENCOUNTER — OFFICE VISIT (OUTPATIENT)
Dept: HEMATOLOGY/ONCOLOGY | Facility: CLINIC | Age: 69
End: 2024-01-05
Payer: MEDICARE

## 2024-01-05 VITALS
HEIGHT: 72 IN | RESPIRATION RATE: 18 BRPM | OXYGEN SATURATION: 99 % | TEMPERATURE: 98 F | SYSTOLIC BLOOD PRESSURE: 148 MMHG | HEART RATE: 72 BPM | WEIGHT: 172.38 LBS | BODY MASS INDEX: 23.35 KG/M2 | DIASTOLIC BLOOD PRESSURE: 83 MMHG

## 2024-01-05 DIAGNOSIS — D68.4 ACQUIRED FACTOR VIII DEFICIENCY: ICD-10-CM

## 2024-01-05 PROCEDURE — 1101F PT FALLS ASSESS-DOCD LE1/YR: CPT | Mod: CPTII,S$GLB,, | Performed by: INTERNAL MEDICINE

## 2024-01-05 PROCEDURE — 3288F FALL RISK ASSESSMENT DOCD: CPT | Mod: CPTII,S$GLB,, | Performed by: INTERNAL MEDICINE

## 2024-01-05 PROCEDURE — 99214 OFFICE O/P EST MOD 30 MIN: CPT | Mod: S$GLB,,, | Performed by: INTERNAL MEDICINE

## 2024-01-05 PROCEDURE — 1159F MED LIST DOCD IN RCRD: CPT | Mod: CPTII,S$GLB,, | Performed by: INTERNAL MEDICINE

## 2024-01-05 PROCEDURE — 1160F RVW MEDS BY RX/DR IN RCRD: CPT | Mod: CPTII,S$GLB,, | Performed by: INTERNAL MEDICINE

## 2024-01-05 PROCEDURE — 3079F DIAST BP 80-89 MM HG: CPT | Mod: CPTII,S$GLB,, | Performed by: INTERNAL MEDICINE

## 2024-01-05 PROCEDURE — 3077F SYST BP >= 140 MM HG: CPT | Mod: CPTII,S$GLB,, | Performed by: INTERNAL MEDICINE

## 2024-01-05 PROCEDURE — 3008F BODY MASS INDEX DOCD: CPT | Mod: CPTII,S$GLB,, | Performed by: INTERNAL MEDICINE

## 2024-01-05 PROCEDURE — 99999 PR PBB SHADOW E&M-EST. PATIENT-LVL IV: CPT | Mod: PBBFAC,,, | Performed by: INTERNAL MEDICINE

## 2024-01-05 PROCEDURE — 1126F AMNT PAIN NOTED NONE PRSNT: CPT | Mod: CPTII,S$GLB,, | Performed by: INTERNAL MEDICINE

## 2024-01-05 RX ORDER — PREDNISONE 20 MG/1
TABLET ORAL
Qty: 90 TABLET | Refills: 0 | Status: SHIPPED | OUTPATIENT
Start: 2024-01-05

## 2024-01-05 NOTE — PROGRESS NOTES
Service Date:  1/5/24    Chief Complaint: nontraumatic retroperitoneal hematoma  (Labs  2 week follow up )    Jos Espino is a 68 y.o. male recently hospitalized with a spontaneous retroperitoneal hematoma. He was not on any blood thinners at the time.   He did not suffer any traumatic event.  States he goes to gym and does lift weights.  He had evacuation of the hematoma as he was having some neurapraxia.  Hematoma then returned but his ability to walk was preserved.  He also had some bleeding issues with his sutures with removal.  He states he is never had bleeding issues prior.  He has never had any problems with prolonged bleeding from shaving or brushing his teeth.    Currently on steroids for factor 8 inhibitor.  Tolerating steroids well.  No complaints to me.  Factor 8 has improved.    Review of Systems   Constitutional: Negative.    HENT: Negative.     Eyes: Negative.    Respiratory: Negative.     Cardiovascular: Negative.    Gastrointestinal: Negative.    Endocrine: Negative.    Genitourinary: Negative.    Musculoskeletal: Negative.    Integumentary:  Negative.   Neurological: Negative.    Hematological: Negative.    Psychiatric/Behavioral: Negative.          Current Outpatient Medications   Medication Instructions    atorvastatin (LIPITOR) 10 mg, Oral, Daily    multivit-min-FA-lycopen-lutein (CENTRUM SILVER) 0.4 mg-300 mcg- 250 mcg Tab 1 tablet, Oral, Daily    pantoprazole (PROTONIX) 20 mg, Oral, Daily    predniSONE (DELTASONE) 20 MG tablet Take 3 tablets for 1 week and then go down to 2.    sulfamethoxazole-trimethoprim 400-80mg (BACTRIM) 400-80 mg per tablet 1 tablet, Oral, Daily    tadalafiL (CIALIS) 5 mg, Oral, Daily PRN        Past Medical History:   Diagnosis Date    Hyperlipidemia         Past Surgical History:   Procedure Laterality Date    CYSTOSCOPY N/A 12/27/2023    Procedure: CYSTOSCOPY;  Surgeon: Ricky Rousseau Jr., MD;  Location: [facility] OR;  Service: Urology;  Laterality: N/A;    INCISION  AND DRAINAGE OF HEMATOMA Left 11/1/2023    Procedure: INCISION AND DRAINAGE, HEMATOMA;  Surgeon: Melba Farias MD;  Location: [facility] OR;  Service: General;  Laterality: Left;    TRANSURETHRAL RESECTION OF PROSTATE N/A 4/7/2022    Procedure: TURP (TRANSURETHRAL RESECTION OF PROSTATE);  Surgeon: Ricky Rousseau Jr., MD;  Location: [facility] OR;  Service: Urology;  Laterality: N/A;        Family History   Problem Relation Age of Onset    Hypertension Brother     Cancer Brother         prostate       Social History     Tobacco Use    Smoking status: Never    Smokeless tobacco: Never   Substance Use Topics    Alcohol use: Yes     Comment: seldom    Drug use: No         Vitals:    01/05/24 1130   BP: (!) 148/83   Pulse: 72   Resp: 18   Temp: 97.8 °F (36.6 °C)        Physical Exam:  BP (!) 148/83 (BP Location: Right arm, Patient Position: Sitting, BP Method: Small (Automatic))   Pulse 72   Temp 97.8 °F (36.6 °C) (Temporal)   Resp 18   Ht 6' (1.829 m)   Wt 78.2 kg (172 lb 6.4 oz)   SpO2 99%   BMI 23.38 kg/m²     Physical Exam  Vitals and nursing note reviewed.   Constitutional:       Appearance: Normal appearance.   HENT:      Head: Normocephalic and atraumatic.      Nose: Nose normal.      Mouth/Throat:      Mouth: Mucous membranes are moist.      Pharynx: Oropharynx is clear.   Eyes:      Extraocular Movements: Extraocular movements intact.      Conjunctiva/sclera: Conjunctivae normal.   Cardiovascular:      Rate and Rhythm: Normal rate and regular rhythm.      Heart sounds: Normal heart sounds.   Pulmonary:      Effort: Pulmonary effort is normal.      Breath sounds: Normal breath sounds.   Abdominal:      General: Abdomen is flat. Bowel sounds are normal.      Palpations: Abdomen is soft.   Musculoskeletal:         General: Normal range of motion.      Cervical back: Normal range of motion and neck supple.   Skin:     General: Skin is warm and dry.   Neurological:      General: No focal deficit present.      Mental  Status: He is alert and oriented to person, place, and time. Mental status is at baseline.   Psychiatric:         Mood and Affect: Mood normal.          Labs:  Lab Results   Component Value Date    WBC 12.60 11/09/2023    RBC 3.24 (L) 11/09/2023    HGB 8.8 (L) 11/09/2023    HCT 26.6 (L) 11/09/2023    MCV 82 11/09/2023    MCH 27.2 11/09/2023    MCHC 33.1 11/09/2023    RDW 14.5 11/09/2023     (H) 11/09/2023    MPV 11.2 11/09/2023    GRAN 7.7 11/09/2023    GRAN 60.9 11/09/2023    LYMPH 2.1 11/09/2023    LYMPH 16.7 (L) 11/09/2023    MONO 1.9 (H) 11/09/2023    MONO 14.8 11/09/2023    EOS 0.6 (H) 11/09/2023    BASO 0.13 11/09/2023    EOSINOPHIL 4.5 11/09/2023    BASOPHIL 1.0 11/09/2023     Sodium   Date Value Ref Range Status   11/09/2023 136 136 - 145 mmol/L Final     Potassium   Date Value Ref Range Status   11/09/2023 5.1 3.5 - 5.1 mmol/L Final     Chloride   Date Value Ref Range Status   11/09/2023 104 95 - 110 mmol/L Final     CO2   Date Value Ref Range Status   11/09/2023 26 23 - 29 mmol/L Final     Glucose   Date Value Ref Range Status   11/09/2023 111 (H) 70 - 110 mg/dL Final     BUN   Date Value Ref Range Status   11/09/2023 15 8 - 23 mg/dL Final     Creatinine   Date Value Ref Range Status   11/09/2023 1.0 0.5 - 1.4 mg/dL Final     Calcium   Date Value Ref Range Status   11/09/2023 9.9 8.7 - 10.5 mg/dL Final     Total Protein   Date Value Ref Range Status   11/09/2023 7.2 6.0 - 8.4 g/dL Final     Albumin   Date Value Ref Range Status   11/09/2023 3.5 3.5 - 5.2 g/dL Final     Total Bilirubin   Date Value Ref Range Status   11/09/2023 1.1 (H) 0.1 - 1.0 mg/dL Final     Comment:     For infants and newborns, interpretation of results should be based  on gestational age, weight and in agreement with clinical  observations.    Premature Infant recommended reference ranges:  Up to 24 hours.............<8.0 mg/dL  Up to 48 hours............<12.0 mg/dL  3-5 days..................<15.0 mg/dL  6-29  days.................<15.0 mg/dL       Alkaline Phosphatase   Date Value Ref Range Status   11/09/2023 76 55 - 135 U/L Final     AST   Date Value Ref Range Status   11/09/2023 55 (H) 10 - 40 U/L Final     ALT   Date Value Ref Range Status   11/09/2023 73 (H) 10 - 44 U/L Final     Anion Gap   Date Value Ref Range Status   11/09/2023 6 (L) 8 - 16 mmol/L Final     eGFR if    Date Value Ref Range Status   04/07/2022 >60 >60 mL/min/1.73 m^2 Final     eGFR if non    Date Value Ref Range Status   04/07/2022 >60 >60 mL/min/1.73 m^2 Final     Comment:     Calculation used to obtain the estimated glomerular filtration  rate (eGFR) is the CKD-EPI equation.          A/P:    Hematoma   Prolonged bleeding   -hs factor VII inhibitor  -due to ongoing slow bleeding, will treat with steroid taper  -dropped down to 60 milligrams daily for 1 week and then 40 milligrams daily.  Return to clinic in 2 weeks with labs.      Aurash Khoobehi, MD  Hematology and Oncology

## 2024-01-11 ENCOUNTER — OFFICE VISIT (OUTPATIENT)
Dept: BARIATRICS | Facility: CLINIC | Age: 69
End: 2024-01-11
Payer: MEDICARE

## 2024-01-11 VITALS
HEART RATE: 83 BPM | SYSTOLIC BLOOD PRESSURE: 170 MMHG | HEIGHT: 70 IN | BODY MASS INDEX: 25.11 KG/M2 | DIASTOLIC BLOOD PRESSURE: 96 MMHG | TEMPERATURE: 99 F | WEIGHT: 175.38 LBS | RESPIRATION RATE: 16 BRPM

## 2024-01-11 DIAGNOSIS — K68.3 RETROPERITONEAL HEMATOMA: Primary | ICD-10-CM

## 2024-01-11 PROCEDURE — 3080F DIAST BP >= 90 MM HG: CPT | Mod: CPTII,S$GLB,, | Performed by: SURGERY

## 2024-01-11 PROCEDURE — 1159F MED LIST DOCD IN RCRD: CPT | Mod: CPTII,S$GLB,, | Performed by: SURGERY

## 2024-01-11 PROCEDURE — 99999 PR PBB SHADOW E&M-EST. PATIENT-LVL III: CPT | Mod: PBBFAC,,, | Performed by: SURGERY

## 2024-01-11 PROCEDURE — 1101F PT FALLS ASSESS-DOCD LE1/YR: CPT | Mod: CPTII,S$GLB,, | Performed by: SURGERY

## 2024-01-11 PROCEDURE — 3077F SYST BP >= 140 MM HG: CPT | Mod: CPTII,S$GLB,, | Performed by: SURGERY

## 2024-01-11 PROCEDURE — 3288F FALL RISK ASSESSMENT DOCD: CPT | Mod: CPTII,S$GLB,, | Performed by: SURGERY

## 2024-01-11 PROCEDURE — 99024 POSTOP FOLLOW-UP VISIT: CPT | Mod: S$GLB,,, | Performed by: SURGERY

## 2024-01-11 PROCEDURE — 1126F AMNT PAIN NOTED NONE PRSNT: CPT | Mod: CPTII,S$GLB,, | Performed by: SURGERY

## 2024-01-11 PROCEDURE — 3008F BODY MASS INDEX DOCD: CPT | Mod: CPTII,S$GLB,, | Performed by: SURGERY

## 2024-01-11 NOTE — PROGRESS NOTES
Started to walk more  Saw hematology    Vitals:    01/11/24 1103   BP: (!) 170/96   Pulse: 83   Resp: 16   Temp: 98.5 °F (36.9 °C)     Left upper thigh with obvious muscle wasting  Incisions healing well, no hematoma  Walking un assisted    A/P    Hematoma- work up by heme revealing- see note    Hematuria- resolved with treatment for coagulopathy    Follow up MRI-  will wait on this, he is still recovering and I think we have an answer for this bleeding

## 2024-01-13 ENCOUNTER — LAB VISIT (OUTPATIENT)
Dept: LAB | Facility: HOSPITAL | Age: 69
End: 2024-01-13
Attending: INTERNAL MEDICINE
Payer: MEDICARE

## 2024-01-13 DIAGNOSIS — D68.4 ACQUIRED FACTOR VIII DEFICIENCY: ICD-10-CM

## 2024-01-13 PROCEDURE — 36415 COLL VENOUS BLD VENIPUNCTURE: CPT | Performed by: INTERNAL MEDICINE

## 2024-01-13 PROCEDURE — 85240 CLOT FACTOR VIII AHG 1 STAGE: CPT | Performed by: INTERNAL MEDICINE

## 2024-01-13 PROCEDURE — 85732 THROMBOPLASTIN TIME PARTIAL: CPT | Mod: 91 | Performed by: INTERNAL MEDICINE

## 2024-01-16 LAB
APTT 1H NP PPP: ABNORMAL S
APTT IMM NP PPP: ABNORMAL S
APTT PPP 1:1 SALINE: ABNORMAL S
APTT PPP: 23.6 SEC (ref 22.9–30.2)
FACT VIII ACT/NOR PPP: 332 % (ref 56–140)

## 2024-01-19 ENCOUNTER — OFFICE VISIT (OUTPATIENT)
Dept: HEMATOLOGY/ONCOLOGY | Facility: CLINIC | Age: 69
End: 2024-01-19
Payer: MEDICARE

## 2024-01-19 VITALS
HEART RATE: 90 BPM | HEIGHT: 72 IN | OXYGEN SATURATION: 99 % | BODY MASS INDEX: 24.34 KG/M2 | WEIGHT: 179.69 LBS | RESPIRATION RATE: 18 BRPM | SYSTOLIC BLOOD PRESSURE: 138 MMHG | DIASTOLIC BLOOD PRESSURE: 72 MMHG | TEMPERATURE: 97 F

## 2024-01-19 DIAGNOSIS — K68.3 NONTRAUMATIC RETROPERITONEAL HEMATOMA: ICD-10-CM

## 2024-01-19 DIAGNOSIS — D68.4 ACQUIRED FACTOR VIII DEFICIENCY: Primary | ICD-10-CM

## 2024-01-19 PROCEDURE — 1126F AMNT PAIN NOTED NONE PRSNT: CPT | Mod: CPTII,S$GLB,, | Performed by: INTERNAL MEDICINE

## 2024-01-19 PROCEDURE — 3008F BODY MASS INDEX DOCD: CPT | Mod: CPTII,S$GLB,, | Performed by: INTERNAL MEDICINE

## 2024-01-19 PROCEDURE — 1101F PT FALLS ASSESS-DOCD LE1/YR: CPT | Mod: CPTII,S$GLB,, | Performed by: INTERNAL MEDICINE

## 2024-01-19 PROCEDURE — 99999 PR PBB SHADOW E&M-EST. PATIENT-LVL IV: CPT | Mod: PBBFAC,,, | Performed by: INTERNAL MEDICINE

## 2024-01-19 PROCEDURE — 3075F SYST BP GE 130 - 139MM HG: CPT | Mod: CPTII,S$GLB,, | Performed by: INTERNAL MEDICINE

## 2024-01-19 PROCEDURE — 3078F DIAST BP <80 MM HG: CPT | Mod: CPTII,S$GLB,, | Performed by: INTERNAL MEDICINE

## 2024-01-19 PROCEDURE — 3288F FALL RISK ASSESSMENT DOCD: CPT | Mod: CPTII,S$GLB,, | Performed by: INTERNAL MEDICINE

## 2024-01-19 PROCEDURE — 1159F MED LIST DOCD IN RCRD: CPT | Mod: CPTII,S$GLB,, | Performed by: INTERNAL MEDICINE

## 2024-01-19 PROCEDURE — 1160F RVW MEDS BY RX/DR IN RCRD: CPT | Mod: CPTII,S$GLB,, | Performed by: INTERNAL MEDICINE

## 2024-01-19 PROCEDURE — 99214 OFFICE O/P EST MOD 30 MIN: CPT | Mod: S$GLB,,, | Performed by: INTERNAL MEDICINE

## 2024-01-19 NOTE — PROGRESS NOTES
Service Date:  1/19/24    Chief Complaint: nontraumatic retroprtitoneal hematoma (Labs  2 week follow up )    Jos Espino is a 68 y.o. male recently hospitalized with a spontaneous retroperitoneal hematoma. He was not on any blood thinners at the time.   He did not suffer any traumatic event.  States he goes to gym and does lift weights.  He had evacuation of the hematoma as he was having some neurapraxia.  Hematoma then returned but his ability to walk was preserved.  He also had some bleeding issues with his sutures with removal.  He states he is never had bleeding issues prior.  He has never had any problems with prolonged bleeding from shaving or brushing his teeth.    Currently on steroids for factor 8 inhibitor.  Tolerating steroids well.  No complaints to me.  Factor 8 has improved.    Review of Systems   Constitutional: Negative.    HENT: Negative.     Eyes: Negative.    Respiratory: Negative.     Cardiovascular: Negative.    Gastrointestinal: Negative.    Endocrine: Negative.    Genitourinary: Negative.    Musculoskeletal: Negative.    Integumentary:  Negative.   Neurological: Negative.    Hematological: Negative.    Psychiatric/Behavioral: Negative.          Current Outpatient Medications   Medication Instructions    atorvastatin (LIPITOR) 10 mg, Oral, Daily    multivit-min-FA-lycopen-lutein (CENTRUM SILVER) 0.4 mg-300 mcg- 250 mcg Tab 1 tablet, Oral, Daily    pantoprazole (PROTONIX) 20 mg, Oral, Daily    predniSONE (DELTASONE) 20 MG tablet Take 3 tablets for 1 week and then go down to 2.    sulfamethoxazole-trimethoprim 400-80mg (BACTRIM) 400-80 mg per tablet 1 tablet, Oral, Daily    tadalafiL (CIALIS) 5 mg, Oral, Daily PRN        Past Medical History:   Diagnosis Date    Hyperlipidemia         Past Surgical History:   Procedure Laterality Date    CYSTOSCOPY N/A 12/27/2023    Procedure: CYSTOSCOPY;  Surgeon: Ricky Rousseau Jr., MD;  Location: [facility] OR;  Service: Urology;  Laterality: N/A;    INCISION  AND DRAINAGE OF HEMATOMA Left 11/1/2023    Procedure: INCISION AND DRAINAGE, HEMATOMA;  Surgeon: Melba Farias MD;  Location: [facility] OR;  Service: General;  Laterality: Left;    TRANSURETHRAL RESECTION OF PROSTATE N/A 4/7/2022    Procedure: TURP (TRANSURETHRAL RESECTION OF PROSTATE);  Surgeon: Ricky Rousseau Jr., MD;  Location: [facility] OR;  Service: Urology;  Laterality: N/A;        Family History   Problem Relation Age of Onset    Hypertension Brother     Cancer Brother         prostate       Social History     Tobacco Use    Smoking status: Never    Smokeless tobacco: Never   Substance Use Topics    Alcohol use: Yes     Comment: seldom    Drug use: No         Vitals:    01/19/24 1403   BP: 138/72   Pulse: 90   Resp: 18   Temp: 97 °F (36.1 °C)        Physical Exam:  /72 (BP Location: Right arm, Patient Position: Sitting, BP Method: Large (Automatic))   Pulse 90   Temp 97 °F (36.1 °C) (Temporal)   Resp 18   Ht 6' (1.829 m)   Wt 81.5 kg (179 lb 10.8 oz)   SpO2 99%   BMI 24.37 kg/m²     Physical Exam  Vitals and nursing note reviewed.   Constitutional:       Appearance: Normal appearance.   HENT:      Head: Normocephalic and atraumatic.      Nose: Nose normal.      Mouth/Throat:      Mouth: Mucous membranes are moist.      Pharynx: Oropharynx is clear.   Eyes:      Extraocular Movements: Extraocular movements intact.      Conjunctiva/sclera: Conjunctivae normal.   Cardiovascular:      Rate and Rhythm: Normal rate and regular rhythm.      Heart sounds: Normal heart sounds.   Pulmonary:      Effort: Pulmonary effort is normal.      Breath sounds: Normal breath sounds.   Abdominal:      General: Abdomen is flat. Bowel sounds are normal.      Palpations: Abdomen is soft.   Musculoskeletal:         General: Normal range of motion.      Cervical back: Normal range of motion and neck supple.   Skin:     General: Skin is warm and dry.   Neurological:      General: No focal deficit present.      Mental Status: He  is alert and oriented to person, place, and time. Mental status is at baseline.   Psychiatric:         Mood and Affect: Mood normal.          Labs:  Lab Results   Component Value Date    WBC 12.60 11/09/2023    RBC 3.24 (L) 11/09/2023    HGB 8.8 (L) 11/09/2023    HCT 26.6 (L) 11/09/2023    MCV 82 11/09/2023    MCH 27.2 11/09/2023    MCHC 33.1 11/09/2023    RDW 14.5 11/09/2023     (H) 11/09/2023    MPV 11.2 11/09/2023    GRAN 7.7 11/09/2023    GRAN 60.9 11/09/2023    LYMPH 2.1 11/09/2023    LYMPH 16.7 (L) 11/09/2023    MONO 1.9 (H) 11/09/2023    MONO 14.8 11/09/2023    EOS 0.6 (H) 11/09/2023    BASO 0.13 11/09/2023    EOSINOPHIL 4.5 11/09/2023    BASOPHIL 1.0 11/09/2023     Sodium   Date Value Ref Range Status   11/09/2023 136 136 - 145 mmol/L Final     Potassium   Date Value Ref Range Status   11/09/2023 5.1 3.5 - 5.1 mmol/L Final     Chloride   Date Value Ref Range Status   11/09/2023 104 95 - 110 mmol/L Final     CO2   Date Value Ref Range Status   11/09/2023 26 23 - 29 mmol/L Final     Glucose   Date Value Ref Range Status   11/09/2023 111 (H) 70 - 110 mg/dL Final     BUN   Date Value Ref Range Status   11/09/2023 15 8 - 23 mg/dL Final     Creatinine   Date Value Ref Range Status   11/09/2023 1.0 0.5 - 1.4 mg/dL Final     Calcium   Date Value Ref Range Status   11/09/2023 9.9 8.7 - 10.5 mg/dL Final     Total Protein   Date Value Ref Range Status   11/09/2023 7.2 6.0 - 8.4 g/dL Final     Albumin   Date Value Ref Range Status   11/09/2023 3.5 3.5 - 5.2 g/dL Final     Total Bilirubin   Date Value Ref Range Status   11/09/2023 1.1 (H) 0.1 - 1.0 mg/dL Final     Comment:     For infants and newborns, interpretation of results should be based  on gestational age, weight and in agreement with clinical  observations.    Premature Infant recommended reference ranges:  Up to 24 hours.............<8.0 mg/dL  Up to 48 hours............<12.0 mg/dL  3-5 days..................<15.0 mg/dL  6-29 days.................<15.0  mg/dL       Alkaline Phosphatase   Date Value Ref Range Status   11/09/2023 76 55 - 135 U/L Final     AST   Date Value Ref Range Status   11/09/2023 55 (H) 10 - 40 U/L Final     ALT   Date Value Ref Range Status   11/09/2023 73 (H) 10 - 44 U/L Final     Anion Gap   Date Value Ref Range Status   11/09/2023 6 (L) 8 - 16 mmol/L Final     eGFR if    Date Value Ref Range Status   04/07/2022 >60 >60 mL/min/1.73 m^2 Final     eGFR if non    Date Value Ref Range Status   04/07/2022 >60 >60 mL/min/1.73 m^2 Final     Comment:     Calculation used to obtain the estimated glomerular filtration  rate (eGFR) is the CKD-EPI equation.          A/P:    Hematoma   Prolonged bleeding   -hs factor VII inhibitor  -due to ongoing slow bleeding, will treat with steroid taper  -drop down to 20 mg for 2 weeks with plans to repeat levels in 4 weeks  -check CT for resolution of hematoma      Aurash Khoobehi, MD  Hematology and Oncology

## 2024-01-25 ENCOUNTER — HOSPITAL ENCOUNTER (OUTPATIENT)
Dept: RADIOLOGY | Facility: HOSPITAL | Age: 69
Discharge: HOME | End: 2024-01-25
Attending: INTERNAL MEDICINE
Payer: MEDICARE

## 2024-01-25 DIAGNOSIS — K68.3 NONTRAUMATIC RETROPERITONEAL HEMATOMA: ICD-10-CM

## 2024-01-25 PROCEDURE — 74177 CT ABD & PELVIS W/CONTRAST: CPT | Mod: TC

## 2024-01-25 PROCEDURE — 25500020 PHARM REV CODE 255: Performed by: INTERNAL MEDICINE

## 2024-01-25 RX ADMIN — IOHEXOL 100 ML: 350 INJECTION, SOLUTION INTRAVENOUS at 08:01

## 2024-02-14 ENCOUNTER — LAB VISIT (OUTPATIENT)
Dept: LAB | Facility: HOSPITAL | Age: 69
End: 2024-02-14
Attending: INTERNAL MEDICINE
Payer: MEDICARE

## 2024-02-14 DIAGNOSIS — D68.4 ACQUIRED FACTOR VIII DEFICIENCY: ICD-10-CM

## 2024-02-14 DIAGNOSIS — K68.3 NONTRAUMATIC RETROPERITONEAL HEMATOMA: ICD-10-CM

## 2024-02-14 LAB
BASOPHILS # BLD AUTO: 0.05 K/UL (ref 0–0.2)
BASOPHILS NFR BLD: 0.7 % (ref 0–1.9)
CREAT SERPL-MCNC: 1 MG/DL (ref 0.5–1.4)
DIFFERENTIAL METHOD BLD: ABNORMAL
EOSINOPHIL # BLD AUTO: 0.2 K/UL (ref 0–0.5)
EOSINOPHIL NFR BLD: 2.5 % (ref 0–8)
ERYTHROCYTE [DISTWIDTH] IN BLOOD BY AUTOMATED COUNT: 16.2 % (ref 11.5–14.5)
EST. GFR  (NO RACE VARIABLE): >60 ML/MIN/1.73 M^2
HCT VFR BLD AUTO: 42.4 % (ref 40–54)
HGB BLD-MCNC: 13.8 G/DL (ref 14–18)
IMM GRANULOCYTES # BLD AUTO: 0.07 K/UL (ref 0–0.04)
IMM GRANULOCYTES NFR BLD AUTO: 0.9 % (ref 0–0.5)
LYMPHOCYTES # BLD AUTO: 2.6 K/UL (ref 1–4.8)
LYMPHOCYTES NFR BLD: 34.7 % (ref 18–48)
MCH RBC QN AUTO: 25.7 PG (ref 27–31)
MCHC RBC AUTO-ENTMCNC: 32.5 G/DL (ref 32–36)
MCV RBC AUTO: 79 FL (ref 82–98)
MONOCYTES # BLD AUTO: 0.9 K/UL (ref 0.3–1)
MONOCYTES NFR BLD: 11.6 % (ref 4–15)
NEUTROPHILS # BLD AUTO: 3.8 K/UL (ref 1.8–7.7)
NEUTROPHILS NFR BLD: 49.6 % (ref 38–73)
NRBC BLD-RTO: 0 /100 WBC
PLATELET # BLD AUTO: 230 K/UL (ref 150–450)
PMV BLD AUTO: 11.4 FL (ref 9.2–12.9)
RBC # BLD AUTO: 5.37 M/UL (ref 4.6–6.2)
WBC # BLD AUTO: 7.56 K/UL (ref 3.9–12.7)

## 2024-02-14 PROCEDURE — 85732 THROMBOPLASTIN TIME PARTIAL: CPT | Performed by: INTERNAL MEDICINE

## 2024-02-14 PROCEDURE — 82565 ASSAY OF CREATININE: CPT | Performed by: INTERNAL MEDICINE

## 2024-02-14 PROCEDURE — 85240 CLOT FACTOR VIII AHG 1 STAGE: CPT | Performed by: INTERNAL MEDICINE

## 2024-02-14 PROCEDURE — 36415 COLL VENOUS BLD VENIPUNCTURE: CPT | Performed by: INTERNAL MEDICINE

## 2024-02-14 PROCEDURE — 85025 COMPLETE CBC W/AUTO DIFF WBC: CPT | Performed by: INTERNAL MEDICINE

## 2024-02-18 LAB
APTT 1H NP PPP: ABNORMAL S
APTT IMM NP PPP: ABNORMAL S
APTT PPP 1:1 SALINE: ABNORMAL S
APTT PPP: 26.4 SEC (ref 22.9–30.2)
FACT VIII ACT/NOR PPP: 211 % (ref 56–140)

## 2024-02-19 ENCOUNTER — OFFICE VISIT (OUTPATIENT)
Dept: HEMATOLOGY/ONCOLOGY | Facility: CLINIC | Age: 69
End: 2024-02-19
Payer: MEDICARE

## 2024-02-19 VITALS
HEIGHT: 70 IN | RESPIRATION RATE: 18 BRPM | SYSTOLIC BLOOD PRESSURE: 145 MMHG | OXYGEN SATURATION: 98 % | WEIGHT: 183.88 LBS | HEART RATE: 75 BPM | TEMPERATURE: 98 F | BODY MASS INDEX: 26.33 KG/M2 | DIASTOLIC BLOOD PRESSURE: 87 MMHG

## 2024-02-19 DIAGNOSIS — K68.3 NONTRAUMATIC RETROPERITONEAL HEMATOMA: ICD-10-CM

## 2024-02-19 DIAGNOSIS — D68.4 ACQUIRED FACTOR VIII DEFICIENCY: Primary | ICD-10-CM

## 2024-02-19 PROCEDURE — 3288F FALL RISK ASSESSMENT DOCD: CPT | Mod: CPTII,S$GLB,, | Performed by: INTERNAL MEDICINE

## 2024-02-19 PROCEDURE — 1159F MED LIST DOCD IN RCRD: CPT | Mod: CPTII,S$GLB,, | Performed by: INTERNAL MEDICINE

## 2024-02-19 PROCEDURE — 3079F DIAST BP 80-89 MM HG: CPT | Mod: CPTII,S$GLB,, | Performed by: INTERNAL MEDICINE

## 2024-02-19 PROCEDURE — 1160F RVW MEDS BY RX/DR IN RCRD: CPT | Mod: CPTII,S$GLB,, | Performed by: INTERNAL MEDICINE

## 2024-02-19 PROCEDURE — 3077F SYST BP >= 140 MM HG: CPT | Mod: CPTII,S$GLB,, | Performed by: INTERNAL MEDICINE

## 2024-02-19 PROCEDURE — 3008F BODY MASS INDEX DOCD: CPT | Mod: CPTII,S$GLB,, | Performed by: INTERNAL MEDICINE

## 2024-02-19 PROCEDURE — 99214 OFFICE O/P EST MOD 30 MIN: CPT | Mod: S$GLB,,, | Performed by: INTERNAL MEDICINE

## 2024-02-19 PROCEDURE — 99999 PR PBB SHADOW E&M-EST. PATIENT-LVL IV: CPT | Mod: PBBFAC,,, | Performed by: INTERNAL MEDICINE

## 2024-02-19 PROCEDURE — 1101F PT FALLS ASSESS-DOCD LE1/YR: CPT | Mod: CPTII,S$GLB,, | Performed by: INTERNAL MEDICINE

## 2024-02-19 PROCEDURE — 1126F AMNT PAIN NOTED NONE PRSNT: CPT | Mod: CPTII,S$GLB,, | Performed by: INTERNAL MEDICINE

## 2024-02-19 NOTE — PROGRESS NOTES
Service Date:  2/19/24    Chief Complaint: nontraumatic retroperitoneal hematoma (Labs  4 week follow up )    Jos Espino is a 68 y.o. male recently hospitalized with a spontaneous retroperitoneal hematoma. He was not on any blood thinners at the time.   He did not suffer any traumatic event.  States he goes to gym and does lift weights.  He had evacuation of the hematoma as he was having some neurapraxia.  Hematoma then returned but his ability to walk was preserved.  He also had some bleeding issues with his sutures with removal.  He states he is never had bleeding issues prior.  He has never had any problems with prolonged bleeding from shaving or brushing his teeth.    Completed steroids for factor 8 inhibitor.  No issues.  Denies any bleeding.    Review of Systems   Constitutional: Negative.    HENT: Negative.     Eyes: Negative.    Respiratory: Negative.     Cardiovascular: Negative.    Gastrointestinal: Negative.    Endocrine: Negative.    Genitourinary: Negative.    Musculoskeletal: Negative.    Integumentary:  Negative.   Neurological: Negative.    Hematological: Negative.    Psychiatric/Behavioral: Negative.          Current Outpatient Medications   Medication Instructions    atorvastatin (LIPITOR) 10 mg, Oral, Daily    multivit-min-FA-lycopen-lutein (CENTRUM SILVER) 0.4 mg-300 mcg- 250 mcg Tab 1 tablet, Oral, Daily    pantoprazole (PROTONIX) 20 mg, Oral, Daily    predniSONE (DELTASONE) 20 MG tablet Take 3 tablets for 1 week and then go down to 2.    tadalafiL (CIALIS) 5 mg, Oral, Daily PRN        Past Medical History:   Diagnosis Date    Hyperlipidemia         Past Surgical History:   Procedure Laterality Date    CYSTOSCOPY N/A 12/27/2023    Procedure: CYSTOSCOPY;  Surgeon: Ricky Rousseau Jr., MD;  Location: [facility] OR;  Service: Urology;  Laterality: N/A;    INCISION AND DRAINAGE OF HEMATOMA Left 11/1/2023    Procedure: INCISION AND DRAINAGE, HEMATOMA;  Surgeon: Melba Farias MD;  Location: [facility]  "OR;  Service: General;  Laterality: Left;    TRANSURETHRAL RESECTION OF PROSTATE N/A 4/7/2022    Procedure: TURP (TRANSURETHRAL RESECTION OF PROSTATE);  Surgeon: Ricky Rousseau Jr., MD;  Location: [facility] OR;  Service: Urology;  Laterality: N/A;        Family History   Problem Relation Age of Onset    Hypertension Brother     Cancer Brother         prostate       Social History     Tobacco Use    Smoking status: Never    Smokeless tobacco: Never   Substance Use Topics    Alcohol use: Yes     Comment: seldom    Drug use: No         Vitals:    02/19/24 0916   BP: (!) 145/87   Pulse: 75   Resp: 18   Temp: 98.1 °F (36.7 °C)        Physical Exam:  BP (!) 145/87 (BP Location: Right arm, Patient Position: Sitting, BP Method: Large (Automatic))   Pulse 75   Temp 98.1 °F (36.7 °C) (Temporal)   Resp 18   Ht 5' 10" (1.778 m)   Wt 83.4 kg (183 lb 13.8 oz)   SpO2 98%   BMI 26.38 kg/m²     Physical Exam  Vitals and nursing note reviewed.   Constitutional:       Appearance: Normal appearance.   HENT:      Head: Normocephalic and atraumatic.      Nose: Nose normal.      Mouth/Throat:      Mouth: Mucous membranes are moist.      Pharynx: Oropharynx is clear.   Eyes:      Extraocular Movements: Extraocular movements intact.      Conjunctiva/sclera: Conjunctivae normal.   Cardiovascular:      Rate and Rhythm: Normal rate and regular rhythm.      Heart sounds: Normal heart sounds.   Pulmonary:      Effort: Pulmonary effort is normal.      Breath sounds: Normal breath sounds.   Abdominal:      General: Abdomen is flat. Bowel sounds are normal.      Palpations: Abdomen is soft.   Musculoskeletal:         General: Normal range of motion.      Cervical back: Normal range of motion and neck supple.   Skin:     General: Skin is warm and dry.   Neurological:      General: No focal deficit present.      Mental Status: He is alert and oriented to person, place, and time. Mental status is at baseline.   Psychiatric:         Mood and Affect: " Mood normal.          Labs:  Lab Results   Component Value Date    WBC 7.56 02/14/2024    RBC 5.37 02/14/2024    HGB 13.8 (L) 02/14/2024    HCT 42.4 02/14/2024    MCV 79 (L) 02/14/2024    MCH 25.7 (L) 02/14/2024    MCHC 32.5 02/14/2024    RDW 16.2 (H) 02/14/2024     02/14/2024    MPV 11.4 02/14/2024    GRAN 3.8 02/14/2024    GRAN 49.6 02/14/2024    LYMPH 2.6 02/14/2024    LYMPH 34.7 02/14/2024    MONO 0.9 02/14/2024    MONO 11.6 02/14/2024    EOS 0.2 02/14/2024    BASO 0.05 02/14/2024    EOSINOPHIL 2.5 02/14/2024    BASOPHIL 0.7 02/14/2024     Sodium   Date Value Ref Range Status   11/09/2023 136 136 - 145 mmol/L Final     Potassium   Date Value Ref Range Status   11/09/2023 5.1 3.5 - 5.1 mmol/L Final     Chloride   Date Value Ref Range Status   11/09/2023 104 95 - 110 mmol/L Final     CO2   Date Value Ref Range Status   11/09/2023 26 23 - 29 mmol/L Final     Glucose   Date Value Ref Range Status   11/09/2023 111 (H) 70 - 110 mg/dL Final     BUN   Date Value Ref Range Status   11/09/2023 15 8 - 23 mg/dL Final     Creatinine   Date Value Ref Range Status   02/14/2024 1.0 0.5 - 1.4 mg/dL Final     Calcium   Date Value Ref Range Status   11/09/2023 9.9 8.7 - 10.5 mg/dL Final     Total Protein   Date Value Ref Range Status   11/09/2023 7.2 6.0 - 8.4 g/dL Final     Albumin   Date Value Ref Range Status   11/09/2023 3.5 3.5 - 5.2 g/dL Final     Total Bilirubin   Date Value Ref Range Status   11/09/2023 1.1 (H) 0.1 - 1.0 mg/dL Final     Comment:     For infants and newborns, interpretation of results should be based  on gestational age, weight and in agreement with clinical  observations.    Premature Infant recommended reference ranges:  Up to 24 hours.............<8.0 mg/dL  Up to 48 hours............<12.0 mg/dL  3-5 days..................<15.0 mg/dL  6-29 days.................<15.0 mg/dL       Alkaline Phosphatase   Date Value Ref Range Status   11/09/2023 76 55 - 135 U/L Final     AST   Date Value Ref Range  Status   11/09/2023 55 (H) 10 - 40 U/L Final     ALT   Date Value Ref Range Status   11/09/2023 73 (H) 10 - 44 U/L Final     Anion Gap   Date Value Ref Range Status   11/09/2023 6 (L) 8 - 16 mmol/L Final     eGFR if    Date Value Ref Range Status   04/07/2022 >60 >60 mL/min/1.73 m^2 Final     eGFR if non    Date Value Ref Range Status   04/07/2022 >60 >60 mL/min/1.73 m^2 Final     Comment:     Calculation used to obtain the estimated glomerular filtration  rate (eGFR) is the CKD-EPI equation.          A/P:    Hematoma   Prolonged bleeding   -has factor VII inhibitor  -due to ongoing slow bleeding, was treated with a steroid taper   -seems to be stable.  Will repeat a CT scan in 3 months to see for resolution of the hematoma.  -return to clinic in 3 months with labs       Aurash Khoobehi, MD  Hematology and Oncology

## 2024-05-01 ENCOUNTER — TELEPHONE (OUTPATIENT)
Dept: HEMATOLOGY/ONCOLOGY | Facility: CLINIC | Age: 69
End: 2024-05-01
Payer: MEDICARE

## 2024-05-01 NOTE — TELEPHONE ENCOUNTER
Called and spoke with patient and his wife and clarified labs needed with appointment date and time. Patient and spouse verbalized understanding.     ----- Message from Deborah Miner, Patient Care Assistant sent at 5/1/2024 10:50 AM CDT -----  Type: Needs Medical Advice  Who Called:  ariadna  Best Call Back Number: [phone]    Additional Information: ariadna is wanting to talk to someone about blood work that was ordered as well as a test he has to take please call to further discuss , thank you.

## 2024-05-13 ENCOUNTER — HOSPITAL ENCOUNTER (OUTPATIENT)
Dept: RADIOLOGY | Facility: HOSPITAL | Age: 69
Discharge: HOME | End: 2024-05-13
Attending: INTERNAL MEDICINE
Payer: MEDICARE

## 2024-05-13 DIAGNOSIS — K68.3 NONTRAUMATIC RETROPERITONEAL HEMATOMA: ICD-10-CM

## 2024-05-13 LAB
CREAT SERPL-MCNC: 1 MG/DL (ref 0.5–1.4)
SAMPLE: NORMAL

## 2024-05-13 PROCEDURE — 25500020 PHARM REV CODE 255

## 2024-05-13 PROCEDURE — 74178 CT ABD&PLV WO CNTR FLWD CNTR: CPT | Mod: TC

## 2024-05-13 PROCEDURE — 74178 CT ABD&PLV WO CNTR FLWD CNTR: CPT | Mod: 26,,, | Performed by: RADIOLOGY

## 2024-05-13 RX ADMIN — IOHEXOL 100 ML: 350 INJECTION, SOLUTION INTRAVENOUS at 08:05

## 2024-05-17 ENCOUNTER — OFFICE VISIT (OUTPATIENT)
Dept: HEMATOLOGY/ONCOLOGY | Facility: CLINIC | Age: 69
End: 2024-05-17
Payer: MEDICARE

## 2024-05-17 VITALS
SYSTOLIC BLOOD PRESSURE: 132 MMHG | TEMPERATURE: 98 F | OXYGEN SATURATION: 98 % | HEART RATE: 76 BPM | DIASTOLIC BLOOD PRESSURE: 77 MMHG | BODY MASS INDEX: 25.62 KG/M2 | WEIGHT: 179 LBS | RESPIRATION RATE: 18 BRPM | HEIGHT: 70 IN

## 2024-05-17 DIAGNOSIS — D68.4 ACQUIRED FACTOR VIII DEFICIENCY: ICD-10-CM

## 2024-05-17 DIAGNOSIS — K68.3 NONTRAUMATIC RETROPERITONEAL HEMATOMA: Primary | ICD-10-CM

## 2024-05-17 PROCEDURE — 99999 PR PBB SHADOW E&M-EST. PATIENT-LVL III: CPT | Mod: PBBFAC,,, | Performed by: INTERNAL MEDICINE

## 2024-05-17 PROCEDURE — 1159F MED LIST DOCD IN RCRD: CPT | Mod: CPTII,S$GLB,, | Performed by: INTERNAL MEDICINE

## 2024-05-17 PROCEDURE — 99213 OFFICE O/P EST LOW 20 MIN: CPT | Mod: S$GLB,,, | Performed by: INTERNAL MEDICINE

## 2024-05-17 PROCEDURE — 3288F FALL RISK ASSESSMENT DOCD: CPT | Mod: CPTII,S$GLB,, | Performed by: INTERNAL MEDICINE

## 2024-05-17 PROCEDURE — 1101F PT FALLS ASSESS-DOCD LE1/YR: CPT | Mod: CPTII,S$GLB,, | Performed by: INTERNAL MEDICINE

## 2024-05-17 PROCEDURE — 1126F AMNT PAIN NOTED NONE PRSNT: CPT | Mod: CPTII,S$GLB,, | Performed by: INTERNAL MEDICINE

## 2024-05-17 PROCEDURE — 3008F BODY MASS INDEX DOCD: CPT | Mod: CPTII,S$GLB,, | Performed by: INTERNAL MEDICINE

## 2024-05-17 PROCEDURE — 3075F SYST BP GE 130 - 139MM HG: CPT | Mod: CPTII,S$GLB,, | Performed by: INTERNAL MEDICINE

## 2024-05-17 PROCEDURE — 3078F DIAST BP <80 MM HG: CPT | Mod: CPTII,S$GLB,, | Performed by: INTERNAL MEDICINE

## 2024-05-17 NOTE — PROGRESS NOTES
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 male recently hospitalized with a spontaneous retroperitoneal hematoma. He was not on any blood thinners at the time.   He did not suffer any traumatic event.  States he goes to gym and does lift weights.  He had evacuation of the hematoma as he was having some neurapraxia.  Hematoma then returned but his ability to walk was preserved.  He also had some bleeding issues with his sutures with removal.  He states he is never had bleeding issues prior.  He has never had any problems with prolonged bleeding from shaving or brushing his teeth.    Completed steroids for factor 8 inhibitor.      Doing well.  All bleeding resolved.  No complaints.    Review of Systems   Constitutional: Negative.    HENT: Negative.     Eyes: Negative.    Respiratory: Negative.     Cardiovascular: Negative.    Gastrointestinal: Negative.    Endocrine: Negative.    Genitourinary: Negative.    Musculoskeletal: Negative.    Integumentary:  Negative.   Neurological: Negative.    Hematological: Negative.    Psychiatric/Behavioral: Negative.          Current Outpatient Medications   Medication Instructions    atorvastatin (LIPITOR) 10 mg, Oral, Daily    multivit-min-FA-lycopen-lutein (CENTRUM SILVER) 0.4 mg-300 mcg- 250 mcg Tab 1 tablet, Oral, Daily    pantoprazole (PROTONIX) 20 mg, Oral, Daily    predniSONE (DELTASONE) 20 MG tablet Take 3 tablets for 1 week and then go down to 2.    tadalafiL (CIALIS) 5 mg, Oral, Daily PRN        Past Medical History:   Diagnosis Date    Hyperlipidemia         Past Surgical History:   Procedure Laterality Date    CYSTOSCOPY N/A 12/27/2023    Procedure: CYSTOSCOPY;  Surgeon: Ricky Rousseau Jr., MD;  Location: [facility] OR;  Service: Urology;  Laterality: N/A;    INCISION AND DRAINAGE OF HEMATOMA Left 11/1/2023    Procedure: INCISION AND DRAINAGE, HEMATOMA;  Surgeon: Melba Farias MD;   "Location: [facility] OR;  Service: General;  Laterality: Left;    TRANSURETHRAL RESECTION OF PROSTATE N/A 4/7/2022    Procedure: TURP (TRANSURETHRAL RESECTION OF PROSTATE);  Surgeon: Ricky Rousseau Jr., MD;  Location: [facility] OR;  Service: Urology;  Laterality: N/A;        Family History   Problem Relation Name Age of Onset    Hypertension Brother x6     Cancer Brother x6         prostate       Social History     Tobacco Use    Smoking status: Never    Smokeless tobacco: Never   Substance Use Topics    Alcohol use: Yes     Comment: seldom    Drug use: No         Vitals:    05/17/24 0918   BP: 132/77   Pulse: 76   Resp: 18   Temp: 98.1 °F (36.7 °C)        Physical Exam:  /77 (BP Location: Right arm, Patient Position: Sitting, BP Method: Small (Automatic))   Pulse 76   Temp 98.1 °F (36.7 °C) (Temporal)   Resp 18   Ht 5' 10" (1.778 m)   Wt 81.2 kg (179 lb 0.2 oz)   SpO2 98%   BMI 25.69 kg/m²     Physical Exam  Vitals and nursing note reviewed.   Constitutional:       Appearance: Normal appearance.   HENT:      Head: Normocephalic and atraumatic.      Nose: Nose normal.      Mouth/Throat:      Mouth: Mucous membranes are moist.      Pharynx: Oropharynx is clear.   Eyes:      Extraocular Movements: Extraocular movements intact.      Conjunctiva/sclera: Conjunctivae normal.   Cardiovascular:      Rate and Rhythm: Normal rate and regular rhythm.      Heart sounds: Normal heart sounds.   Pulmonary:      Effort: Pulmonary effort is normal.      Breath sounds: Normal breath sounds.   Abdominal:      General: Abdomen is flat. Bowel sounds are normal.      Palpations: Abdomen is soft.   Musculoskeletal:         General: Normal range of motion.      Cervical back: Normal range of motion and neck supple.   Skin:     General: Skin is warm and dry.   Neurological:      General: No focal deficit present.      Mental Status: He is alert and oriented to person, place, and time. Mental status is at baseline.   Psychiatric:         " Mood and Affect: Mood normal.          Labs:  Lab Results   Component Value Date    WBC 5.29 05/13/2024    RBC 5.11 05/13/2024    HGB 13.8 (L) 05/13/2024    HCT 42.2 05/13/2024    MCV 83 05/13/2024    MCH 27.0 05/13/2024    MCHC 32.7 05/13/2024    RDW 12.4 05/13/2024     05/13/2024    MPV 12.2 05/13/2024    GRAN 2.1 05/13/2024    GRAN 39.2 05/13/2024    LYMPH 2.0 05/13/2024    LYMPH 38.0 05/13/2024    MONO 0.9 05/13/2024    MONO 16.8 (H) 05/13/2024    EOS 0.2 05/13/2024    BASO 0.07 05/13/2024    EOSINOPHIL 4.3 05/13/2024    BASOPHIL 1.3 05/13/2024     Sodium   Date Value Ref Range Status   11/09/2023 136 136 - 145 mmol/L Final     Potassium   Date Value Ref Range Status   11/09/2023 5.1 3.5 - 5.1 mmol/L Final     Chloride   Date Value Ref Range Status   11/09/2023 104 95 - 110 mmol/L Final     CO2   Date Value Ref Range Status   11/09/2023 26 23 - 29 mmol/L Final     Glucose   Date Value Ref Range Status   11/09/2023 111 (H) 70 - 110 mg/dL Final     BUN   Date Value Ref Range Status   11/09/2023 15 8 - 23 mg/dL Final     Creatinine   Date Value Ref Range Status   02/14/2024 1.0 0.5 - 1.4 mg/dL Final     Calcium   Date Value Ref Range Status   11/09/2023 9.9 8.7 - 10.5 mg/dL Final     Total Protein   Date Value Ref Range Status   11/09/2023 7.2 6.0 - 8.4 g/dL Final     Albumin   Date Value Ref Range Status   11/09/2023 3.5 3.5 - 5.2 g/dL Final     Total Bilirubin   Date Value Ref Range Status   11/09/2023 1.1 (H) 0.1 - 1.0 mg/dL Final     Comment:     For infants and newborns, interpretation of results should be based  on gestational age, weight and in agreement with clinical  observations.    Premature Infant recommended reference ranges:  Up to 24 hours.............<8.0 mg/dL  Up to 48 hours............<12.0 mg/dL  3-5 days..................<15.0 mg/dL  6-29 days.................<15.0 mg/dL       Alkaline Phosphatase   Date Value Ref Range Status   11/09/2023 76 55 - 135 U/L Final     AST   Date Value Ref  Range Status   11/09/2023 55 (H) 10 - 40 U/L Final     ALT   Date Value Ref Range Status   11/09/2023 73 (H) 10 - 44 U/L Final     Anion Gap   Date Value Ref Range Status   11/09/2023 6 (L) 8 - 16 mmol/L Final     eGFR if    Date Value Ref Range Status   04/07/2022 >60 >60 mL/min/1.73 m^2 Final     eGFR if non    Date Value Ref Range Status   04/07/2022 >60 >60 mL/min/1.73 m^2 Final     Comment:     Calculation used to obtain the estimated glomerular filtration  rate (eGFR) is the CKD-EPI equation.          A/P:    Hematoma   Prolonged bleeding   -has factor VII inhibitor  -treated with steroid taper  -CT scan shows shrinkage of the hematoma   -as patient is doing well, I no longer need to see him.  He can return in the future if he has any bleeding issues.  There is no need to treat the inhibitor with no underlying bleeding.      Aurash Khoobehi, MD  Hematology and Oncology

## 2024-05-17 NOTE — Clinical Note
RTC prn. I ordered Factor VIII, can you find out if the jocy it or not? It isn't showing as processing.

## 2024-05-22 ENCOUNTER — PATIENT MESSAGE (OUTPATIENT)
Dept: BARIATRICS | Facility: CLINIC | Age: 69
End: 2024-05-22
Payer: MEDICARE

## 2024-05-22 ENCOUNTER — TELEPHONE (OUTPATIENT)
Dept: HEMATOLOGY/ONCOLOGY | Facility: CLINIC | Age: 69
End: 2024-05-22
Payer: MEDICARE

## 2024-05-22 ENCOUNTER — PATIENT MESSAGE (OUTPATIENT)
Dept: HEMATOLOGY/ONCOLOGY | Facility: CLINIC | Age: 69
End: 2024-05-22
Payer: MEDICARE

## 2024-05-22 DIAGNOSIS — D68.4 ACQUIRED FACTOR VIII DEFICIENCY: Primary | ICD-10-CM

## 2024-05-22 NOTE — TELEPHONE ENCOUNTER
Called and spoke with patient and his wife and advised that their message from this morning via the portal was sent to Dr. Khoobehi to review, but he does not get into the office until 9:00 am. Once Dr. Khoobehi reviews and advises, I will notify patient. Both verbalized understanding.     ----- Message from Madison Valle sent at 5/22/2024  7:44 AM CDT -----  Type: Needs Medical Advice  Who Called:  Blanca Espino, wife   Best Call Back Number: [phone] (home)   Additional Information: requesting a call back- patient has developed a blood clot under his tongue

## 2024-05-24 ENCOUNTER — LAB VISIT (OUTPATIENT)
Dept: LAB | Facility: HOSPITAL | Age: 69
End: 2024-05-24
Attending: INTERNAL MEDICINE
Payer: MEDICARE

## 2024-05-24 DIAGNOSIS — D68.4 ACQUIRED FACTOR VIII DEFICIENCY: ICD-10-CM

## 2024-05-24 PROCEDURE — 85335 FACTOR INHIBITOR TEST: CPT | Mod: 91 | Performed by: INTERNAL MEDICINE

## 2024-05-24 PROCEDURE — 85335 FACTOR INHIBITOR TEST: CPT | Performed by: INTERNAL MEDICINE

## 2024-05-24 PROCEDURE — 36415 COLL VENOUS BLD VENIPUNCTURE: CPT | Performed by: INTERNAL MEDICINE

## 2024-05-29 LAB
COAGULATION FACTOR VIII INHIBITOR [PRESENCE] IN PLATELET POOR PLASMA BY COAGULATION ASSAY: POSITIVE
FACT INHIB XXX PPP-ACNC: 4.8 BU
MAYO MISCELLANEOUS RESULT (REF): NORMAL

## 2024-05-30 ENCOUNTER — OFFICE VISIT (OUTPATIENT)
Dept: SURGERY | Facility: CLINIC | Age: 69
End: 2024-05-30
Payer: MEDICARE

## 2024-05-30 ENCOUNTER — OFFICE VISIT (OUTPATIENT)
Dept: HEMATOLOGY/ONCOLOGY | Facility: CLINIC | Age: 69
End: 2024-05-30
Payer: MEDICARE

## 2024-05-30 VITALS
HEIGHT: 70 IN | HEART RATE: 61 BPM | RESPIRATION RATE: 16 BRPM | SYSTOLIC BLOOD PRESSURE: 130 MMHG | BODY MASS INDEX: 25.81 KG/M2 | DIASTOLIC BLOOD PRESSURE: 78 MMHG | TEMPERATURE: 98 F | WEIGHT: 180.31 LBS | OXYGEN SATURATION: 97 %

## 2024-05-30 VITALS
HEART RATE: 76 BPM | BODY MASS INDEX: 25.73 KG/M2 | WEIGHT: 179.69 LBS | RESPIRATION RATE: 16 BRPM | HEIGHT: 70 IN | DIASTOLIC BLOOD PRESSURE: 77 MMHG | SYSTOLIC BLOOD PRESSURE: 124 MMHG | TEMPERATURE: 98 F

## 2024-05-30 DIAGNOSIS — K68.3 NONTRAUMATIC RETROPERITONEAL HEMATOMA: Primary | ICD-10-CM

## 2024-05-30 DIAGNOSIS — D68.4 ACQUIRED FACTOR VIII DEFICIENCY: Primary | ICD-10-CM

## 2024-05-30 PROCEDURE — 99214 OFFICE O/P EST MOD 30 MIN: CPT | Mod: S$GLB,,, | Performed by: INTERNAL MEDICINE

## 2024-05-30 PROCEDURE — 1125F AMNT PAIN NOTED PAIN PRSNT: CPT | Mod: CPTII,S$GLB,, | Performed by: SURGERY

## 2024-05-30 PROCEDURE — 3008F BODY MASS INDEX DOCD: CPT | Mod: CPTII,S$GLB,, | Performed by: SURGERY

## 2024-05-30 PROCEDURE — 1159F MED LIST DOCD IN RCRD: CPT | Mod: CPTII,S$GLB,, | Performed by: INTERNAL MEDICINE

## 2024-05-30 PROCEDURE — 3288F FALL RISK ASSESSMENT DOCD: CPT | Mod: CPTII,S$GLB,, | Performed by: SURGERY

## 2024-05-30 PROCEDURE — 3078F DIAST BP <80 MM HG: CPT | Mod: CPTII,S$GLB,, | Performed by: INTERNAL MEDICINE

## 2024-05-30 PROCEDURE — 3075F SYST BP GE 130 - 139MM HG: CPT | Mod: CPTII,S$GLB,, | Performed by: INTERNAL MEDICINE

## 2024-05-30 PROCEDURE — 1101F PT FALLS ASSESS-DOCD LE1/YR: CPT | Mod: CPTII,S$GLB,, | Performed by: SURGERY

## 2024-05-30 PROCEDURE — 3288F FALL RISK ASSESSMENT DOCD: CPT | Mod: CPTII,S$GLB,, | Performed by: INTERNAL MEDICINE

## 2024-05-30 PROCEDURE — 99213 OFFICE O/P EST LOW 20 MIN: CPT | Mod: S$GLB,,, | Performed by: SURGERY

## 2024-05-30 PROCEDURE — 99999 PR PBB SHADOW E&M-EST. PATIENT-LVL III: CPT | Mod: PBBFAC,,, | Performed by: INTERNAL MEDICINE

## 2024-05-30 PROCEDURE — 3074F SYST BP LT 130 MM HG: CPT | Mod: CPTII,S$GLB,, | Performed by: SURGERY

## 2024-05-30 PROCEDURE — 1125F AMNT PAIN NOTED PAIN PRSNT: CPT | Mod: CPTII,S$GLB,, | Performed by: INTERNAL MEDICINE

## 2024-05-30 PROCEDURE — 3008F BODY MASS INDEX DOCD: CPT | Mod: CPTII,S$GLB,, | Performed by: INTERNAL MEDICINE

## 2024-05-30 PROCEDURE — 99999 PR PBB SHADOW E&M-EST. PATIENT-LVL III: CPT | Mod: PBBFAC,,, | Performed by: SURGERY

## 2024-05-30 PROCEDURE — 3078F DIAST BP <80 MM HG: CPT | Mod: CPTII,S$GLB,, | Performed by: SURGERY

## 2024-05-30 PROCEDURE — 1101F PT FALLS ASSESS-DOCD LE1/YR: CPT | Mod: CPTII,S$GLB,, | Performed by: INTERNAL MEDICINE

## 2024-05-30 NOTE — PROGRESS NOTES
Service Date:  5/30/24    Chief Complaint: Nontraumatic retroperitoneal hematoma (Labs )    Jos Espino is a 68 y.o. male recently hospitalized with a spontaneous retroperitoneal hematoma. He was not on any blood thinners at the time.   He did not suffer any traumatic event.  States he goes to gym and does lift weights.  He had evacuation of the hematoma as he was having some neurapraxia.  Hematoma then returned but his ability to walk was preserved.  He also had some bleeding issues with his sutures with removal.  He states he is never had bleeding issues prior.  He has never had any problems with prolonged bleeding from shaving or brushing his teeth.    Completed steroids for factor 8 inhibitor.      I had recently seen the patient.  He then sent me a picture of the next day of a blood blister under his tongue.  He ended up seeing his dentist recommended gargling salt water in this helped resolve it.  In the meantime I checked it inhibitor level and factor 8 activity.  Inhibitor level is back elevated at 4.8 [place] units.  Factor 8 activity is at 1%.  Of note patient noticed some soreness in his right arm with some darkening of the skin.    Review of Systems   Constitutional: Negative.    HENT: Negative.     Eyes: Negative.    Respiratory: Negative.     Cardiovascular: Negative.    Gastrointestinal: Negative.    Endocrine: Negative.    Genitourinary: Negative.    Musculoskeletal: Negative.    Integumentary:  Negative.   Neurological: Negative.    Hematological: Negative.    Psychiatric/Behavioral: Negative.          Current Outpatient Medications   Medication Instructions    atorvastatin (LIPITOR) 10 mg, Oral, Daily    multivit-min-FA-lycopen-lutein (CENTRUM SILVER) 0.4 mg-300 mcg- 250 mcg Tab 1 tablet, Oral, Daily    tadalafiL (CIALIS) 5 mg, Oral, Daily PRN        Past Medical History:   Diagnosis Date    Hyperlipidemia         Past Surgical History:   Procedure Laterality Date    CYSTOSCOPY N/A 12/27/2023  "   Procedure: CYSTOSCOPY;  Surgeon: Ricky Rousseau Jr., MD;  Location: [facility] OR;  Service: Urology;  Laterality: N/A;    INCISION AND DRAINAGE OF HEMATOMA Left 11/1/2023    Procedure: INCISION AND DRAINAGE, HEMATOMA;  Surgeon: Melba Farias MD;  Location: [facility] OR;  Service: General;  Laterality: Left;    TRANSURETHRAL RESECTION OF PROSTATE N/A 4/7/2022    Procedure: TURP (TRANSURETHRAL RESECTION OF PROSTATE);  Surgeon: Ricky Rousseau Jr., MD;  Location: [facility] OR;  Service: Urology;  Laterality: N/A;        Family History   Problem Relation Name Age of Onset    Hypertension Brother x6     Cancer Brother x6         prostate       Social History     Tobacco Use    Smoking status: Never    Smokeless tobacco: Never   Substance Use Topics    Alcohol use: Yes     Comment: seldom    Drug use: No         Vitals:    05/30/24 1348   BP: 130/78   Pulse: 61   Resp: 16   Temp: 97.9 °F (36.6 °C)        Physical Exam:  /78 (BP Location: Right arm, Patient Position: Sitting, BP Method: Small (Automatic))   Pulse 61   Temp 97.9 °F (36.6 °C) (Temporal)   Resp 16   Ht 5' 10" (1.778 m)   Wt 81.8 kg (180 lb 5.4 oz)   SpO2 97%   BMI 25.88 kg/m²     Physical Exam  Vitals and nursing note reviewed.   Constitutional:       Appearance: Normal appearance.   HENT:      Head: Normocephalic and atraumatic.      Nose: Nose normal.      Mouth/Throat:      Mouth: Mucous membranes are moist.      Pharynx: Oropharynx is clear.   Eyes:      Extraocular Movements: Extraocular movements intact.      Conjunctiva/sclera: Conjunctivae normal.   Cardiovascular:      Rate and Rhythm: Normal rate and regular rhythm.      Heart sounds: Normal heart sounds.   Pulmonary:      Effort: Pulmonary effort is normal.      Breath sounds: Normal breath sounds.   Abdominal:      General: Abdomen is flat. Bowel sounds are normal.      Palpations: Abdomen is soft.   Musculoskeletal:         General: Normal range of motion.      Cervical back: Normal " range of motion and neck supple.      Comments: Right forearm with skin darkening possible mass like soft area underneath   Skin:     General: Skin is warm and dry.   Neurological:      General: No focal deficit present.      Mental Status: He is alert and oriented to person, place, and time. Mental status is at baseline.   Psychiatric:         Mood and Affect: Mood normal.          Labs:  Lab Results   Component Value Date    WBC 5.29 05/13/2024    RBC 5.11 05/13/2024    HGB 13.8 (L) 05/13/2024    HCT 42.2 05/13/2024    MCV 83 05/13/2024    MCH 27.0 05/13/2024    MCHC 32.7 05/13/2024    RDW 12.4 05/13/2024     05/13/2024    MPV 12.2 05/13/2024    GRAN 2.1 05/13/2024    GRAN 39.2 05/13/2024    LYMPH 2.0 05/13/2024    LYMPH 38.0 05/13/2024    MONO 0.9 05/13/2024    MONO 16.8 (H) 05/13/2024    EOS 0.2 05/13/2024    BASO 0.07 05/13/2024    EOSINOPHIL 4.3 05/13/2024    BASOPHIL 1.3 05/13/2024     Sodium   Date Value Ref Range Status   11/09/2023 136 136 - 145 mmol/L Final     Potassium   Date Value Ref Range Status   11/09/2023 5.1 3.5 - 5.1 mmol/L Final     Chloride   Date Value Ref Range Status   11/09/2023 104 95 - 110 mmol/L Final     CO2   Date Value Ref Range Status   11/09/2023 26 23 - 29 mmol/L Final     Glucose   Date Value Ref Range Status   11/09/2023 111 (H) 70 - 110 mg/dL Final     BUN   Date Value Ref Range Status   11/09/2023 15 8 - 23 mg/dL Final     Creatinine   Date Value Ref Range Status   02/14/2024 1.0 0.5 - 1.4 mg/dL Final     Calcium   Date Value Ref Range Status   11/09/2023 9.9 8.7 - 10.5 mg/dL Final     Total Protein   Date Value Ref Range Status   11/09/2023 7.2 6.0 - 8.4 g/dL Final     Albumin   Date Value Ref Range Status   11/09/2023 3.5 3.5 - 5.2 g/dL Final     Total Bilirubin   Date Value Ref Range Status   11/09/2023 1.1 (H) 0.1 - 1.0 mg/dL Final     Comment:     For infants and newborns, interpretation of results should be based  on gestational age, weight and in agreement with  clinical  observations.    Premature Infant recommended reference ranges:  Up to 24 hours.............<8.0 mg/dL  Up to 48 hours............<12.0 mg/dL  3-5 days..................<15.0 mg/dL  6-29 days.................<15.0 mg/dL       Alkaline Phosphatase   Date Value Ref Range Status   11/09/2023 76 55 - 135 U/L Final     AST   Date Value Ref Range Status   11/09/2023 55 (H) 10 - 40 U/L Final     ALT   Date Value Ref Range Status   11/09/2023 73 (H) 10 - 44 U/L Final     Anion Gap   Date Value Ref Range Status   11/09/2023 6 (L) 8 - 16 mmol/L Final     eGFR if    Date Value Ref Range Status   04/07/2022 >60 >60 mL/min/1.73 m^2 Final     eGFR if non    Date Value Ref Range Status   04/07/2022 >60 >60 mL/min/1.73 m^2 Final     Comment:     Calculation used to obtain the estimated glomerular filtration  rate (eGFR) is the CKD-EPI equation.          A/P:    Hematoma   Prolonged bleeding   -has factor VII inhibitor  -treated with steroid taper for retroperitoneal hematoma previously   -inhibitor level again increased.  Patient seems have possibly a hematoma in the right arm.  -given that the [place] units are under 5 at 4.8, and no active major bleeding, I am unsure if he needs treatment for the inhibitor level with another steroid taper plus or minus Rituxan.  Therefore I will refer to Dr. Melgoza at [facility] for her expertise.  If any infusional treatment or steroids as needed here, patient can return to complete treatment here after getting recommendations at [facility]      Aurash Khoobehi, MD  Hematology and Oncology

## 2024-05-31 ENCOUNTER — PATIENT MESSAGE (OUTPATIENT)
Dept: SURGERY | Facility: CLINIC | Age: 69
End: 2024-05-31
Payer: MEDICARE

## 2024-05-31 ENCOUNTER — TELEPHONE (OUTPATIENT)
Dept: BARIATRICS | Facility: CLINIC | Age: 69
End: 2024-05-31
Payer: MEDICARE

## 2024-05-31 ENCOUNTER — PATIENT MESSAGE (OUTPATIENT)
Dept: HEMATOLOGY/ONCOLOGY | Facility: CLINIC | Age: 69
End: 2024-05-31
Payer: MEDICARE

## 2024-05-31 ENCOUNTER — DOCUMENTATION ONLY (OUTPATIENT)
Dept: HEMATOLOGY/ONCOLOGY | Facility: CLINIC | Age: 69
End: 2024-05-31
Payer: MEDICARE

## 2024-05-31 NOTE — TELEPHONE ENCOUNTER
Pt's wife called wanting a referral sent to [facility]. Referral was faxed over on 5/31/24 by me.

## 2024-06-17 NOTE — PROGRESS NOTES
Exercising again   Almost all function has returned  Vitals:    05/30/24 1104   BP: 124/77   Pulse: 76   Resp: 16   Temp: 98.2 °F (36.8 °C)     Left upper thigh with muscle wasting  Incision healing well  Walking un assisted  Gross motor intact ble    A/P  Hematoma- seeing heme  Hematuria- saw uro    See neurology for anything they can over for his leg  - was planning to get mri but he has been diagnosed with acquired hemophilia- I think this gives good reason for his spontaneous bleeding.  Will defer and further imaging to Neurology.

## 2025-01-02 ENCOUNTER — OFFICE VISIT (OUTPATIENT)
Dept: UROLOGY | Facility: CLINIC | Age: 70
End: 2025-01-02
Payer: MEDICARE

## 2025-01-02 VITALS — BODY MASS INDEX: 25.05 KG/M2 | WEIGHT: 175 LBS | HEIGHT: 70 IN

## 2025-01-02 DIAGNOSIS — Z12.5 SCREENING FOR PROSTATE CANCER: ICD-10-CM

## 2025-01-02 DIAGNOSIS — N13.8 BENIGN PROSTATIC HYPERPLASIA WITH URINARY OBSTRUCTION: Primary | ICD-10-CM

## 2025-01-02 DIAGNOSIS — N40.1 BENIGN PROSTATIC HYPERPLASIA WITH URINARY OBSTRUCTION: Primary | ICD-10-CM

## 2025-01-02 DIAGNOSIS — R31.0 GROSS HEMATURIA: ICD-10-CM

## 2025-01-02 LAB — POC RESIDUAL URINE VOLUME: 8 ML (ref 0–100)

## 2025-01-02 PROCEDURE — 3008F BODY MASS INDEX DOCD: CPT | Mod: CPTII,S$GLB,, | Performed by: UROLOGY

## 2025-01-02 PROCEDURE — 1159F MED LIST DOCD IN RCRD: CPT | Mod: CPTII,S$GLB,, | Performed by: UROLOGY

## 2025-01-02 PROCEDURE — 99214 OFFICE O/P EST MOD 30 MIN: CPT | Mod: S$GLB,,, | Performed by: UROLOGY

## 2025-01-02 PROCEDURE — 1126F AMNT PAIN NOTED NONE PRSNT: CPT | Mod: CPTII,S$GLB,, | Performed by: UROLOGY

## 2025-01-02 PROCEDURE — 3288F FALL RISK ASSESSMENT DOCD: CPT | Mod: CPTII,S$GLB,, | Performed by: UROLOGY

## 2025-01-02 PROCEDURE — 1160F RVW MEDS BY RX/DR IN RCRD: CPT | Mod: CPTII,S$GLB,, | Performed by: UROLOGY

## 2025-01-02 PROCEDURE — 99999 PR PBB SHADOW E&M-EST. PATIENT-LVL III: CPT | Mod: PBBFAC,,, | Performed by: UROLOGY

## 2025-01-02 PROCEDURE — 51798 US URINE CAPACITY MEASURE: CPT | Mod: S$GLB,,, | Performed by: UROLOGY

## 2025-01-02 PROCEDURE — G2211 COMPLEX E/M VISIT ADD ON: HCPCS | Mod: S$GLB,,, | Performed by: UROLOGY

## 2025-01-02 PROCEDURE — 1101F PT FALLS ASSESS-DOCD LE1/YR: CPT | Mod: CPTII,S$GLB,, | Performed by: UROLOGY

## 2025-01-02 NOTE — PROGRESS NOTES
[facility] Established Patient/H&P:    Jos Espino is a 69 y.o. male who presents for follow up for lower urinary tract symptoms and history of gross hematuria.     Patient with a history of HLD who has a several year history of bothersome lower urinary tract symptoms that have been progressively worsening.      He reports weak stream, frequency, incomplete emptying, intermittency, straining and nocturia x 2. He has been on Flomax for numerous years with no significant improvement. Drinks mostly water throughout the day.      He underwent attempted Urolift of an enlarged median lobe with Dr. Gruber in [place] on 1/14/22. Per the op note, he was able to place one implant in the median lobe that did not appear to be placed well. Procedure aborted. Here reporting that his symptoms have persisted and he is bothered.      UA negative on 1/20/22.      He also has moderate erectile dysfunction. He tried Viagra, but discontinued as his wife was not satisfied. Not bothered by his erectile function.      Two brothers with prostate cancer. Works as a .        Interval History     6/3/22: He is now s/p uncomplicated TURP on 4/7/22. Path benign. Discharged home on POD 1. States he has been voiding well. IPSS improved to 4 from 17 pre-operatively. PVR 0 today. He has not discontinued Flomax.     6/5/23: Here today for follow up. States that he is continuing to do well. IPSS 1. PVR 0 mL. He has discontinued Flomax. No complaints.  He also reports mild erectile dysfunction. Has trouble obtaining an erection intermittently. Has not tried any medication. Has tried Viagra several years ago, but stopped as he had side effects.    1/2/25: He underwent gross hematuria work-up in 2023. He is now s/p negative cystoscopy on 12/27/23.  Ct abdomen pelvis with contrast on 1/25/24 with marked decrease in size of the large left retroperitoneal hematoma with a residual 4.7 x 1.8 x 5.5 cm rim-enhancing fluid  "collection in the left iliacus muscle. This may represent residual hematoma versus abscess and correlation with clinical exam is recommended.  Mild stranding within the fat anterior to the left iliac crest and in the soft tissues of the left hip with focal calcification. Of note, he was diagnosed with acquired hemophilia after his hematuria work up.     Here today with no complaints. Only with nocturia. Not bothered. No recent PSA.      Denies any fever, chills, flank pain, gross hematuria, bone pain, unintentional weight loss        PSA  1.1  6/5/23  1.03                 12/6/21     IPSS    QoL  2 0 1/2/25  1 0 6/5/23  4          1          6/3/22  17        4          3/10/22     PVR  8 mL  1/2/25  0 mL                6/5/23  0 mL                6/3/22  76 mL              3/10/22    Urine culture  No growth 11/29/23    Urine cytology  Negative 11/29/23    Past Medical History:   Diagnosis Date    Hyperlipidemia        Past Surgical History:   Procedure Laterality Date    CYSTOSCOPY N/A 12/27/2023    Procedure: CYSTOSCOPY;  Surgeon: Ricky Rousseau Jr., MD;  Location: [facility] OR;  Service: Urology;  Laterality: N/A;    INCISION AND DRAINAGE OF HEMATOMA Left 11/1/2023    Procedure: INCISION AND DRAINAGE, HEMATOMA;  Surgeon: Melba Farias MD;  Location: [facility] OR;  Service: General;  Laterality: Left;    TRANSURETHRAL RESECTION OF PROSTATE N/A 4/7/2022    Procedure: TURP (TRANSURETHRAL RESECTION OF PROSTATE);  Surgeon: Ricky Rousseau Jr., MD;  Location: [facility] OR;  Service: Urology;  Laterality: N/A;       Review of patient's allergies indicates:  No Known Allergies    Medications Reviewed: see MAR    FOCUSED PHYSICAL EXAM:    There were no vitals filed for this visit.  Body mass index is 25.11 kg/m². Weight: 79.4 kg (175 lb) Height: 5' 10" (177.8 cm)       General: Alert, cooperative, no distress, appears stated age  Abdomen: Soft, non-tender, no CVA tenderness, non-distended        LABS:    Recent Results (from " the past 2 weeks)   POCT Bladder Scan    Collection Time: 01/02/25  8:35 AM   Result Value Ref Range    POC Residual Urine Volume 8 0 - 100 mL           Assessment/Diagnosis:    1. Benign prostatic hyperplasia with urinary obstruction  POCT Bladder Scan      2. Gross hematuria        3. Screening for prostate cancer  PSA, Screening          Plans:    - I spent 30 minutes of the day of this encounter preparing for, treating and managing this patient. Visit today included increased complexity associated with the care of the episodic problem addressed and managing the longitudinal care of the patient due to the serious and/or complex managed problem(s) BPH and history of gross hematuria. Extensive discussion with patient regarding the etiology and management of his lower urinary tract symptoms. Explained that LUTS are multifactorial and can be secondary to an enlarged prostate, PO intake of bladder irritants, overactive bladder, constipation, malignancy, trauma, infection, stones or medications. LUTS have improved significantly s/p TURP. Hematuria has resolved after treatment of his acquired hemophilia.   - PSA next available.   - RTC in 1 year with symptom score and PSA.

## 2025-01-03 LAB — PSA SERPL-MCNC: 1.2 NG/ML (ref 0–4)

## 2025-06-03 ENCOUNTER — TELEPHONE (OUTPATIENT)
Facility: CLINIC | Age: 70
End: 2025-06-03
Payer: MEDICARE

## 2025-06-04 DIAGNOSIS — D68.311 ACQUIRED HEMOPHILIA A: Primary | ICD-10-CM

## 2025-06-04 RX ORDER — FAMOTIDINE 10 MG/ML
20 INJECTION, SOLUTION INTRAVENOUS
OUTPATIENT
Start: 2025-06-09

## 2025-06-04 RX ORDER — SODIUM CHLORIDE 0.9 % (FLUSH) 0.9 %
10 SYRINGE (ML) INJECTION
OUTPATIENT
Start: 2025-06-09

## 2025-06-04 RX ORDER — DIPHENHYDRAMINE HYDROCHLORIDE 50 MG/ML
50 INJECTION, SOLUTION INTRAMUSCULAR; INTRAVENOUS ONCE AS NEEDED
OUTPATIENT
Start: 2025-06-09

## 2025-06-04 RX ORDER — MEPERIDINE HYDROCHLORIDE 50 MG/ML
25 INJECTION INTRAMUSCULAR; INTRAVENOUS; SUBCUTANEOUS
OUTPATIENT
Start: 2025-06-09

## 2025-06-04 RX ORDER — HEPARIN 100 UNIT/ML
500 SYRINGE INTRAVENOUS
OUTPATIENT
Start: 2025-06-09

## 2025-06-04 RX ORDER — EPINEPHRINE 0.3 MG/.3ML
0.3 INJECTION SUBCUTANEOUS ONCE AS NEEDED
OUTPATIENT
Start: 2025-06-09

## 2025-06-04 RX ORDER — ACETAMINOPHEN 325 MG/1
650 TABLET ORAL
OUTPATIENT
Start: 2025-06-09

## 2025-06-06 ENCOUNTER — TELEPHONE (OUTPATIENT)
Dept: HEMATOLOGY/ONCOLOGY | Facility: CLINIC | Age: 70
End: 2025-06-06
Payer: MEDICARE

## 2025-06-09 ENCOUNTER — TELEPHONE (OUTPATIENT)
Dept: HEMATOLOGY/ONCOLOGY | Facility: CLINIC | Age: 70
End: 2025-06-09
Payer: MEDICARE

## 2025-06-09 NOTE — TELEPHONE ENCOUNTER
Spoke with pt's wife to advise that rituxan is authorized, but we are waiting on the approval letter. Once the approval letter is received, pt will be called to schedule. No further questions.     ----- Message from Mayuri sent at 6/9/2025  9:58 AM CDT -----  Pt 's wife Blanca calling to make an appt for an infusion [phone]

## 2025-06-09 NOTE — TELEPHONE ENCOUNTER
Received a call from Malka at [facility]. The pt can be seen by any provider and does not have to change to Dr Kasper. If he is established with Dr Khoobehi, he can stay with Dr Khoobehi. They are requesting that the pt be treated as soon as possible with any provider. Treatment recommendations from Dr Lofton were faxed and scanned into the media file on 6/3/2025.  She will call the pt to clarify the recommendation for the pt to follow up with his provider.

## 2025-06-10 ENCOUNTER — TELEPHONE (OUTPATIENT)
Dept: HEMATOLOGY/ONCOLOGY | Facility: CLINIC | Age: 70
End: 2025-06-10
Payer: MEDICARE

## 2025-06-10 DIAGNOSIS — Z11.59 ENCOUNTER FOR SCREENING FOR OTHER VIRAL DISEASES: ICD-10-CM

## 2025-06-10 DIAGNOSIS — K68.3 NONTRAUMATIC RETROPERITONEAL HEMATOMA: Primary | ICD-10-CM

## 2025-06-16 ENCOUNTER — TELEPHONE (OUTPATIENT)
Facility: CLINIC | Age: 70
End: 2025-06-16
Payer: MEDICARE

## 2025-06-16 ENCOUNTER — TELEPHONE (OUTPATIENT)
Dept: HEMATOLOGY/ONCOLOGY | Facility: CLINIC | Age: 70
End: 2025-06-16
Payer: MEDICARE

## 2025-06-16 DIAGNOSIS — K68.3 NONTRAUMATIC RETROPERITONEAL HEMATOMA: Primary | ICD-10-CM

## 2025-06-16 NOTE — TELEPHONE ENCOUNTER
Pt's spouse called. She and the pt are agreeable to labs today, Education tomorrow with consent in [address].  Start date for Rituximab Wed 7a. The pt has the Hepatitis labs done by [facility] and she is having the labs faxed from local [facility] location. Will verify with Dr Ceron if labs done on 6/2/25 are sufficient for baseline labs.

## 2025-06-16 NOTE — TELEPHONE ENCOUNTER
Spoke with Malka at [facility], Pt will be seeing our NP Jasmin at 11:00 here in [place]. Scheduled for infusion start on Wednesday at 7 am. Pt was notified.

## 2025-06-16 NOTE — TELEPHONE ENCOUNTER
Received incoming call from the pt's spouse asking if the pt is being scheduled. The pt has auth for Rituximab. I checked with the  for the infusion center. She said that she will reach out to the clinic nurse regarding the start to ensure that additional appointments are not needed prior to starting and reach out to the pt to schedule.  I called the pt's spouse back and relayed the information

## 2025-06-16 NOTE — TELEPHONE ENCOUNTER
Per Dr Ceron, new baseline labs will need to be drawn. Hepatitis labs are already completed. Results are scanned into media file.  Orders for CBC, CMP, LDH faxed to [facility] at the request of the pt. Face sheet instructs [facility] to fax results to the office fax [phone] when completed. Pt and spouse are aware and will get lab drawn this morning.  Advised the pt and spouse to contact this RN Navigator as needed if I can be of additional assistance.

## 2025-06-17 ENCOUNTER — CLINICAL SUPPORT (OUTPATIENT)
Facility: CLINIC | Age: 70
End: 2025-06-17
Payer: MEDICARE

## 2025-06-17 ENCOUNTER — PATIENT MESSAGE (OUTPATIENT)
Dept: HEMATOLOGY/ONCOLOGY | Facility: CLINIC | Age: 70
End: 2025-06-17
Payer: MEDICARE

## 2025-06-17 ENCOUNTER — TELEPHONE (OUTPATIENT)
Dept: HEMATOLOGY/ONCOLOGY | Facility: CLINIC | Age: 70
End: 2025-06-17
Payer: MEDICARE

## 2025-06-17 VITALS
DIASTOLIC BLOOD PRESSURE: 77 MMHG | HEIGHT: 70 IN | TEMPERATURE: 98 F | SYSTOLIC BLOOD PRESSURE: 134 MMHG | WEIGHT: 167 LBS | OXYGEN SATURATION: 100 % | HEART RATE: 67 BPM | BODY MASS INDEX: 23.91 KG/M2 | RESPIRATION RATE: 16 BRPM

## 2025-06-17 DIAGNOSIS — D68.311 ACQUIRED HEMOPHILIA A: Primary | ICD-10-CM

## 2025-06-17 PROCEDURE — 99215 OFFICE O/P EST HI 40 MIN: CPT | Mod: S$GLB,,,

## 2025-06-17 PROCEDURE — 99999 PR PBB SHADOW E&M-EST. PATIENT-LVL III: CPT | Mod: PBBFAC,,,

## 2025-06-17 PROCEDURE — G2211 COMPLEX E/M VISIT ADD ON: HCPCS | Mod: S$GLB,,,

## 2025-06-17 RX ORDER — ONDANSETRON 8 MG/1
8 TABLET, FILM COATED ORAL EVERY 8 HOURS PRN
Qty: 60 TABLET | Refills: 2 | Status: SHIPPED | OUTPATIENT
Start: 2025-06-17

## 2025-06-17 NOTE — PROGRESS NOTES
[facility] HEMATOLGY ONCOLOGY     Subjective:       Patient ID: Jos Espino is a 69 y.o. male presents today for chemotherapy education.      Chief Complaint: Chemo School    HPI    Mr Espino is here today to receive eduction Rituximab for the treatment of his Hemophilia. He is here today with his wife.    He is feeling well and motivated for treatment    He is on schedule to start treatment this Wednesday 6/18      Oncology History    No history exists.       Past Medical History:   Diagnosis Date    Hyperlipidemia        Past Surgical History:   Procedure Laterality Date    CYSTOSCOPY N/A 12/27/2023    Procedure: CYSTOSCOPY;  Surgeon: Ricky Rousseau Jr., MD;  Location: [facility] OR;  Service: Urology;  Laterality: N/A;    INCISION AND DRAINAGE OF HEMATOMA Left 11/1/2023    Procedure: INCISION AND DRAINAGE, HEMATOMA;  Surgeon: Melba Farias MD;  Location: [facility] OR;  Service: General;  Laterality: Left;    TRANSURETHRAL RESECTION OF PROSTATE N/A 4/7/2022    Procedure: TURP (TRANSURETHRAL RESECTION OF PROSTATE);  Surgeon: Ricky Rousseau Jr., MD;  Location: [facility] OR;  Service: Urology;  Laterality: N/A;       Social History     Socioeconomic History    Marital status:    Occupational History    Occupation:      Employer:  [employer]   Tobacco Use    Smoking status: Never    Smokeless tobacco: Never   Substance and Sexual Activity    Alcohol use: Yes     Comment: seldom    Drug use: No    Sexual activity: Yes     Partners: Female     Birth control/protection: None     Social Drivers of Health     Financial Resource Strain: Low Risk  (11/15/2023)    Overall Financial Resource Strain (CARDIA)     Difficulty of Paying Living Expenses: Not hard at all   Food Insecurity: No Food Insecurity (11/15/2023)    Hunger Vital Sign     Worried About Running Out of Food in the Last Year: Never true     Ran Out of Food in the Last Year: Never true   Transportation Needs: No Transportation Needs (11/15/2023)     "PRAPARE - Transportation     Lack of Transportation (Medical): No     Lack of Transportation (Non-Medical): No   Physical Activity: Unknown (11/15/2023)    Exercise Vital Sign     Days of Exercise per Week: 0 days   Stress: No Stress Concern Present (11/15/2023)    [employer] [place] [employer] - Occupational Stress Questionnaire     Feeling of Stress : Not at all   Housing Stability: Low Risk  (11/15/2023)    Housing Stability Vital Sign     Unable to Pay for Housing in the Last Year: No     Number of Places Lived in the Last Year: 1     Unstable Housing in the Last Year: No       Family History   Problem Relation Name Age of Onset    Hypertension Brother x6     Cancer Brother x6         prostate       Review of patient's allergies indicates:  No Known Allergies    Current Medications[1]    All medications and past history have been reviewed.    Review of Systems   Constitutional:  Negative for appetite change and unexpected weight change.   HENT:  Negative for mouth sores.    Eyes:  Negative for visual disturbance.   Respiratory:  Negative for cough and shortness of breath.    Cardiovascular:  Negative for chest pain.   Gastrointestinal:  Negative for abdominal pain and diarrhea.   Genitourinary:  Negative for frequency.   Musculoskeletal:  Negative for back pain.   Skin:  Negative for rash.   Neurological:  Negative for headaches.   Hematological:  Negative for adenopathy.   Psychiatric/Behavioral:  The patient is not nervous/anxious.        Objective:        Physcial Examination  VITAL SIGNS:    Body surface area is 1.93 meters squared.   Pain Assessment  Vitals:    06/17/25 1045   BP: 134/77   Pulse: 67   Resp: 16   Temp: 97.7 °F (36.5 °C)   TempSrc: Temporal   SpO2: 100%   Weight: 75.8 kg (167 lb)   Height: 5' 10" (1.778 m)   PainSc: 0-No pain          Wt Readings from Last 5 Encounters:   06/17/25 75.8 kg (167 lb)   01/02/25 79.4 kg (175 lb)   05/30/24 81.8 kg (180 lb 5.4 oz)   05/30/24 81.5 kg " (179 lb 10.8 oz)   05/17/24 81.2 kg (179 lb 0.2 oz)       Physical Exam  Constitutional:       Appearance: Normal appearance.   HENT:      Head: Normocephalic.      Nose: Nose normal.   Cardiovascular:      Rate and Rhythm: Normal rate and regular rhythm.      Pulses: Normal pulses.      Heart sounds: Normal heart sounds.   Pulmonary:      Effort: Pulmonary effort is normal.      Breath sounds: Normal breath sounds.   Abdominal:      General: Abdomen is flat.      Palpations: Abdomen is soft.   Musculoskeletal:         General: Normal range of motion.   Skin:     General: Skin is warm and dry.      Capillary Refill: Capillary refill takes 2 to 3 seconds.   Neurological:      General: No focal deficit present.      Mental Status: He is alert and oriented to person, place, and time.   Psychiatric:         Mood and Affect: Mood normal.              Laboratory and Radiology   Lab Results   Component Value Date    WBC 6.08 06/16/2025    RBC 5.15 06/16/2025    HGB 14.0 06/16/2025    HCT 41.3 06/16/2025    MCV 80 (L) 06/16/2025    MCH 27.2 06/16/2025    MCHC 33.9 06/16/2025    RDW 12.5 06/16/2025     06/16/2025    MPV 12.1 06/16/2025    GRAN 2.1 05/13/2024    GRAN 39.2 05/13/2024    LYMPH 32.2 06/16/2025    LYMPH 1.96 06/16/2025    MONO 10.9 06/16/2025    MONO 0.66 06/16/2025    EOS 5.8 06/16/2025    EOS 0.35 06/16/2025    BASO 0.07 05/13/2024    EOSINOPHIL 4.3 05/13/2024    BASOPHIL 1.0 06/16/2025    BASOPHIL 0.06 06/16/2025     BMP  Lab Results   Component Value Date     06/16/2025    K 4.5 06/16/2025     06/16/2025    CO2 24 06/16/2025    BUN 13 06/16/2025    CREATININE 1.1 06/16/2025    CALCIUM 9.2 06/16/2025    ANIONGAP 10 06/16/2025    ESTGFRAFRICA >60 04/07/2022    EGFRNONAA >60 04/07/2022     Lab Results   Component Value Date    ALT 20 06/16/2025    AST 24 06/16/2025    ALKPHOS 72 06/16/2025    BILITOT 0.6 06/16/2025     No results found for this or any previous visit (from the past 2160  "hours).  No results found for this or any previous visit (from the past 2160 hours).  No results found for this or any previous visit (from the past 2160 hours).    Pathology  Pathology Results  (Last 10 years)                 23 0000  Specimen to Pathology Final result    Narrative:  This result has an attachment that is not available.       [facility]   DEPARTMENT OF PATHOLOGY AND DERMATOPATHOLOGY   [address] [place] [address], [address] * [place]  [id number] *  PHONE [phone] * [phone] * [phone]   PHILIP BELLO M.D., DERMATOPATHOLOGIST * ALO WHEATLEY M.D., PATHOLOGIST   FUENTES MARTÍNEZ M.D., PATHOLOGIST * BRET ZAVALA M.D., HEMATOPATHOLOGIST * NANETTE PONCE M.D., PATHOLOGIST       PATIENT NAME: JYOTHI ESPINO   HISTORY NUMBER: [id number]   : [id number]   SEX: M   SERVICE:  3/13/2023   RECEIVED:  3/14/2023   REPORTED:  3/15/2023   PHYSICIAN: TRANG Fenton633), M.D.   LOCATION: [facility]   DEPT #: [id number]   CLINICAL DATA   Specimen A: Large Intestine, Left/Descending Colon - Polyp - cold snare       Pre-op diagnosis:   History of colonic polyps [Z86.010]   GROSS DESCRIPTION   A. Received in formalin labeled "Jyothi Espino, 1, polyp descending" are two soft tissues aggregating 3 x 2 x 1 mm. Inked with hematoxylin.   TS1,2p.  cr/kishor   Block labeled 5918, C. Espino   MICROSCOPIC DIAGNOSIS   A - LARGE INTESTINE, LEFT/DESCENDING COLON - POLYP - COLD SNARE   TUBULAR ADENOMA.       Unless "gross only" is specified, the final diagnosis for each specimen is based on a microscopic examination of representative sections of the tissue.       If applicable, controls were reviewed and showed appropriate reactivity.       Electronically signed by: BRET ZAVALA MD       22 130  Specimen to Pathology, Surgery Urology Final result    Narrative:  Pre-op Diagnosis: Lower urinary tract symptoms (LUTS) [R39.9]   Benign prostatic hyperplasia with weak urinary stream [N40.1,   R39.12] "   Procedure(s):   TURP (TRANSURETHRAL RESECTION OF PROSTATE)   Number of specimens: 1   Name of specimens: 1. PROSTATE CHIPS   Release to patient->Immediate   Specimen total (fresh, frozen, permanent):->1               All lab results and imaging results have been reviewed and discussed with the patient.        TITLE: PLAN OF CARE FOR THE CHEMOTHERAPY PATIENT / TEACHING PROTOCOL    PURPOSE: To involve the patient / significant other in the plan of care and to provide teaching to the significant other & patient receiving chemotherapy.    LEVEL: Independent.    CONTENT: The Plan of Care for the chemotherapy patient is individualized and appropriate to the patients needs, strengths, limitations, & goals.  Education includes information regarding chemotherapy side effects, the treatment itself, and self-care  Activities.    GOAL / OUTCOME STANDARDS    PHYSIOLOGIC: The client will remain free or experience minimal side effects or toxicities throughout the chemotherapy treatment period.     PSYCHOLOGIC: The client/significant others will demonstrate positive coping mechanisms in relation to chemotherapy and its side effects.      COGINITIVE: The client/significant others will verbalize understanding of self-care measure to avoid/minimize side effects of the chemotherapy regimen.    EVALUATION / COMMENT KEY:    V = Audiovisual/Video  S = Successfully meets outcome  N = Needs further instruction  NA = Not applicable to the patient  P = Previous knowledge  U = Unable to comprehend  * = See progress notes      PLAN OF CARE  INFORMATION TO BE DELIVERED / NURSING INTERVENTIONS DATE EVALUATION   Assessment of client/caregiver,         knowledge of cancer diagnosis,         and chemotherapy as a treatment. 1a. Evaluate patient/caregiver learning ability    b. Plan teaching sessions with patient/caregiver according to needs and present anxiety level/ability to learn.    c. Provide Chemotherapy Education Packet,        Mouth Care  Protocol,         Specific Patient Education Sheets. 06/17/2025 S   Individual chemotherapy treatment         plan. 2a. Review of Chemotherapy Education handout from [employer]            06/17/2025   S   Knowledge Deficit & Self-Management of general side effects common to all chemotherapy:  Nausea/Vomiting  b.   Diarrhea  Mouth Care  Dental care  Constipation  Hair Loss  Potential for infection  Fatigue   3a. Reinforce that the majority of side effects from chemotherapy are reversible and are  controlled both in the hospital and at home        (blood counts recover, hair grows back).   b.  Refer to the following for reinforcement of         information post-treatment:  Mouth Care Protocol.  Bowel Protocol for constipation or diarrhea.  3.  Drug Specific Chemotherapy Information Sheets for each medication patient receiving.    06/17/2025     S     PLAN OF CARE  INFORMATION TO BE DELIVERED / NURSING INTERVENTIONS DATE EVALUATION   h. Potential for bleeding         i. Potential anemia/fatigue         j. Potential sunburn         k. Birth control measures  l. Safety measures post treatment 4.  Chemotherapy Home Care Instruction  and Safety Information Sheet.  A. patient/caregivers to thoroughly cook shellfish (shrimp, crab, etc) to decrease the chance of infection.    B.  Use sunscreen and protective clothing while in the sun.   06/17/2025      Knowledge deficit & Self Management of Drug Specific  Side Effects.    BLADDER EFFECTS        (Hemorrhagic Cystitis)                  Preventable with adequate hydration; occurs 2-3 days or more post treatment.   1.  Instruct patient to:  a.   Void at least every 2 hours; increase intake.  b.   DO NOT hold urine; go when urge is felt.  c.    Empty bladder at bedtime and on         awakening.  d.   Observe for color changes (red to tea           colored), amount and frequency changes.  e.   Notify oncologist of any abnormalities           in urine or voiding or if you cannot                drink adequate fluids.   06/17/2025   S   b.   CHANGES IN URINE   COLOR:      1.   Instruct patient:  a.   Most evident in first 2-3 voidings after           administration.  Lasts less than 24 hours.  If urine is discolored 2 or more days post- treatment, notify oncologist.      06/17/2025 S   c.    KIDNEY EFFECTS           (Nephrotoxicity)   1.  Instruct patient to:  a.   Drink 8-16 glasses of fluid/day the day   pre-treatment and 3-4 days post-treatment to maintain hydration; the best way to minimize kidney problems.  b.   Notify oncologist immediately if unable to drink fluids or if changes are noted in urinary elimination.     06/17/2025   S   PULMONARY TOXICITY    Instruct patient to report symptoms such as shortness of breath, chest pain, shallow breathing, or chest wall discomfort to physician.  Reinforce preventative measures used by the health care team.  Baseline and periodic PFT and chest x-ray.   06/17/2025   S     PLAN OF CARE INFORMATION TO BE DELIVERED / NURSING INTERVENTIONS DATE EVALUATION   NERVE & MUSCLE EFFECTS (neurotoxocity; neuropathy, possible visual/hearing changes)        Instruct patient to:    Report numbness or tingling of the hands/feet, loss of fine motor movement (buttoning shirt, tying shoelaces), or gait changes to your oncologist.  If numbness/tingling are present:  protect feet with shoes at all times.  Use gloves for washing dishes/gardening & potholders in kitchen.       06/17/2025   S   CARDIOTOXICITY  Decreased effectiveness of             cardiac function. Effective are                  cumulative and irreversible.                                    CARDIAC ARRYTHMIAS              4   Instruct:  Heart function may be tested before treatment and perdiocally during treatment.  Notify oncologist of irregular pulse, palpitations, shortness of breath, or swelling in lower extremities/feet.          Taxol and Taxotere can cause arrhythmias on infusion that resolve  once infusion discontinued. Instruct nurse if any irregularity felt.    06/17/2025   S   EXTRAVASTION  Occurs when vesicants leak outside of vein and cause damage to the skin and underlying tissues.   Reinforce preventive measures used to avoid complications.  Fresh IV site or central line monitored continuously with vesicant IVP.  Continuous infusion via central line site and blood return monitored periodically around the clock.  Instruct to:  Notify nurse of any discomfort, burning, stinging, etc. at IV site during chemotherapy administration.  Notify oncologist of any redness, pain, or swelling at IV site after discharge from hospital.   06/17/2025   S   HYPERSENSITIVITY can happen with any medication.   Instruct patient:  Nurse is with them during the initial part of treatment and will be close by to monitor.  Pre-medication ordered by the oncologist must be taken on time. If doses are missed, treatment will need to be re-scheduled.  Skin redness, itching, or hives appearing after discharge should be reported to oncologist. 06/17/2025   S       PLAN OF CARE INFORMATION TO BE DELIVERED / NURSING INTERVENTIONS DATE EVALUATION   FLU-LIKE SYNDROME      Instruct patient symptoms are hard to prevent and may include fever, shaking chills, muscle and body aches.  Taking prescribed medications from physician if needed.  Adequate fluids are important.    Reinforce the need to call if temperature is         elevated to 100.4 or more  06/17/2025   S   HAND-FOOT SYNDROME  causes painful, symmetric swelling and redness of palms and soles                  Instruct patient to report any numbness or tingling in the hands or feet.  Explain prevention techniques, such as     Use heavy moisturizers to lessen skin dryness and itching, but to avoid if skin is cracked or broken  Bathe in tepid water, use non-perfumed soap, and wash gently. Baths with oatmeal or diluted baking soda may be soothing.  Avoid tight fitting shoes and  repetitive actions, such as rubbing hands or applying pressure to hands/feet.  Review measures to take should syndrome occur:  Cold compresses and elevation for          edema  Pain medications and other measures as ordered by oncologist.   4.   Syndrome resolves few weeks after therapy. 06/17/2025   S   5. DISCHARGE PLANNING /        EDUCATION 1.    Explain importance of compliance with follow- up  tests (CBC, CMP).  2.    Verify patient/caregiver know:  a.    Oncologists office phone number.  b.    Dates of follow-up appointments.  c.    Prescriptions given for nausea  3.   Review side effects to monitor and notify          oncologist about.  4.   Reinforce the need for patient and caregivers to:  a.    Review information given.  b.    Call oncologists office with questions  or symptoms  5.   Provide [facility] [place] Brochure make referrals if needed for financial or .   06/17/2025   S     PROGRESS NOTES: I met with the patient Jos graham today for chemotherapy education. he will be starting treatment with Rituximab for the treatment of his Hemophilia . We discussed the mechanism of action, potential side effects of this treatment as well as ways he can manage them at home. Some of these side effects include but or not limited to fever, nausea, vomiting, decreased appetite, fatigue, weakness, cytopenias, myalgia/arthralgia, constipation, diarrhea, bleeding, headache, shortness of breath, nail changes, taste change, hair thinning/loss, mood disturbances, or edema. We also discussed dietary modifications he should make although this will be discussed in more detail with the dietician. he was provided with anti-emetic medication, a copy of all of the information we discussed today as well as our contact information. he will be provided a schedule on his first day of treatment. We will obtain labs on a weekly basis and the patient will follow-up with the physician for toxicity monitoring  throughout treatment. All questions were answered and an informed consent was obtained. he was reminded to certainly contact us sooner if needed.  Attached to the patients folder and discussed with the patient the 24 hour/ 7 days a week after hours telephone number for the physician.  Patient notified to call anytime 24/7 because their is a physician on call for any problems that may arise.  Patient also notified to report to [facility] if they can not get in touch with a physician after hours.  Discussed the five wishes booklet with the patient and their family.           Assessment/Plan:   Acquired hemophilia A  Assessment & Plan:  Mr Espino is here today to receive eduction Rituximab for the treatment of his Hemophilia.   He is on schedule to start treatment 6/19  All risk and side effects reviewed, all questions and concerns answered   Consent form signed  CBC , CMP ordered weekly while on treatment   Zofran RX provided     FU in 1 week with JOHN Chirinos    Orders:  -     ondansetron (ZOFRAN) 8 MG tablet; Take 1 tablet (8 mg total) by mouth every 8 (eight) hours as needed for Nausea.  Dispense: 60 tablet; Refill: 2  -     Cancel: CBC Auto Differential; Standing  -     Cancel: Comprehensive Metabolic Panel; Standing  -     CBC Auto Differential; Standing  -     Comprehensive Metabolic Panel; Standing         I have explained and the patient understands all of  the current recommendation(s). I have answered all of their questions to the best of my ability and to their complete satisfaction.       Medications Ordered:  Zofran 8mg 1 tab PO Q8h prn nausea      Standing Labs Ordered:  CBC weekly  CMP weekly      Electronically signed by: Electronically signed by: Jasmin Stanton, MSN, APRN FNP-C                 [1]   Current Outpatient Medications:     atorvastatin (LIPITOR) 10 MG tablet, Take 10 mg by mouth once daily., Disp: , Rfl:     multivit-min-FA-lycopen-lutein (CENTRUM SILVER) 0.4 mg-300 mcg- 250 mcg Tab, Take 1  tablet by mouth once daily., Disp: , Rfl:     ondansetron (ZOFRAN) 8 MG tablet, Take 1 tablet (8 mg total) by mouth every 8 (eight) hours as needed for Nausea., Disp: 60 tablet, Rfl: 2    tadalafiL (CIALIS) 5 MG tablet, Take 1 tablet (5 mg total) by mouth daily as needed for Erectile Dysfunction., Disp: 30 tablet, Rfl: 11

## 2025-06-17 NOTE — TELEPHONE ENCOUNTER
Called the pt and his spouse, Blanca as requested with estimate of Rituximab treatment co-pay responsibility.  Message from [employer] Team was read aloud to them as well as copied into a patient portal message.  No additional questions at this time.    The estimated patient responsibility is $898.41.  The patient has a $550.00 deductible with $550.00 currently remaining and a $4,151.00 out of pocket with $4,061.00 currently remaining.     If you have any other questions, please feel free to contact the Central Pricing Office at [phone] or toll free at [phone].  Thank you for choosing [facility] for your healthcare needs.     [facility]

## 2025-06-17 NOTE — ASSESSMENT & PLAN NOTE
Mr Espino is here today to receive eduction Rituximab for the treatment of his Hemophilia.   He is on schedule to start treatment 6/18  All risk and side effects reviewed, all questions and concerns answered   Consent form signed  CBC , CMP ordered weekly while on treatment   Zofran RX provided     FU in 1 week with JOHN Chirinos

## 2025-06-18 ENCOUNTER — INFUSION (OUTPATIENT)
Dept: INFUSION THERAPY | Facility: HOSPITAL | Age: 70
End: 2025-06-18
Attending: INTERNAL MEDICINE
Payer: MEDICARE

## 2025-06-18 VITALS
DIASTOLIC BLOOD PRESSURE: 87 MMHG | HEART RATE: 68 BPM | RESPIRATION RATE: 16 BRPM | OXYGEN SATURATION: 98 % | WEIGHT: 176.13 LBS | HEIGHT: 70 IN | TEMPERATURE: 97 F | SYSTOLIC BLOOD PRESSURE: 151 MMHG | BODY MASS INDEX: 25.21 KG/M2

## 2025-06-18 DIAGNOSIS — D68.311 ACQUIRED HEMOPHILIA A: Primary | ICD-10-CM

## 2025-06-18 PROCEDURE — 96413 CHEMO IV INFUSION 1 HR: CPT

## 2025-06-18 PROCEDURE — 96367 TX/PROPH/DG ADDL SEQ IV INF: CPT

## 2025-06-18 PROCEDURE — 63600175 PHARM REV CODE 636 W HCPCS: Mod: JZ,TB | Performed by: INTERNAL MEDICINE

## 2025-06-18 PROCEDURE — 96375 TX/PRO/DX INJ NEW DRUG ADDON: CPT

## 2025-06-18 PROCEDURE — 96415 CHEMO IV INFUSION ADDL HR: CPT

## 2025-06-18 PROCEDURE — 25000003 PHARM REV CODE 250: Performed by: INTERNAL MEDICINE

## 2025-06-18 RX ORDER — FAMOTIDINE 10 MG/ML
20 INJECTION, SOLUTION INTRAVENOUS
OUTPATIENT
Start: 2025-06-25

## 2025-06-18 RX ORDER — MEPERIDINE HYDROCHLORIDE 50 MG/ML
25 INJECTION INTRAMUSCULAR; INTRAVENOUS; SUBCUTANEOUS
OUTPATIENT
Start: 2025-06-25

## 2025-06-18 RX ORDER — FAMOTIDINE 10 MG/ML
20 INJECTION, SOLUTION INTRAVENOUS
Status: COMPLETED | OUTPATIENT
Start: 2025-06-18 | End: 2025-06-18

## 2025-06-18 RX ORDER — ACETAMINOPHEN 325 MG/1
650 TABLET ORAL
OUTPATIENT
Start: 2025-06-25

## 2025-06-18 RX ORDER — DIPHENHYDRAMINE HYDROCHLORIDE 50 MG/ML
50 INJECTION, SOLUTION INTRAMUSCULAR; INTRAVENOUS ONCE AS NEEDED
OUTPATIENT
Start: 2025-06-25

## 2025-06-18 RX ORDER — SODIUM CHLORIDE 0.9 % (FLUSH) 0.9 %
10 SYRINGE (ML) INJECTION
Status: DISCONTINUED | OUTPATIENT
Start: 2025-06-18 | End: 2025-06-18 | Stop reason: HOSPADM

## 2025-06-18 RX ORDER — MEPERIDINE HYDROCHLORIDE 25 MG/ML
25 INJECTION INTRAMUSCULAR; INTRAVENOUS; SUBCUTANEOUS
Status: DISCONTINUED | OUTPATIENT
Start: 2025-06-18 | End: 2025-06-18 | Stop reason: HOSPADM

## 2025-06-18 RX ORDER — SODIUM CHLORIDE 0.9 % (FLUSH) 0.9 %
10 SYRINGE (ML) INJECTION
OUTPATIENT
Start: 2025-06-25

## 2025-06-18 RX ORDER — DIPHENHYDRAMINE HYDROCHLORIDE 50 MG/ML
50 INJECTION, SOLUTION INTRAMUSCULAR; INTRAVENOUS ONCE AS NEEDED
Status: DISCONTINUED | OUTPATIENT
Start: 2025-06-18 | End: 2025-06-18 | Stop reason: HOSPADM

## 2025-06-18 RX ORDER — ACETAMINOPHEN 325 MG/1
650 TABLET ORAL
Status: COMPLETED | OUTPATIENT
Start: 2025-06-18 | End: 2025-06-18

## 2025-06-18 RX ORDER — HEPARIN 100 UNIT/ML
500 SYRINGE INTRAVENOUS
OUTPATIENT
Start: 2025-06-25

## 2025-06-18 RX ORDER — EPINEPHRINE 0.3 MG/.3ML
0.3 INJECTION SUBCUTANEOUS ONCE AS NEEDED
Status: DISCONTINUED | OUTPATIENT
Start: 2025-06-18 | End: 2025-06-18 | Stop reason: HOSPADM

## 2025-06-18 RX ORDER — EPINEPHRINE 0.3 MG/.3ML
0.3 INJECTION SUBCUTANEOUS ONCE AS NEEDED
OUTPATIENT
Start: 2025-06-25

## 2025-06-18 RX ADMIN — FAMOTIDINE 20 MG: 10 INJECTION INTRAVENOUS at 07:06

## 2025-06-18 RX ADMIN — SODIUM CHLORIDE: 9 INJECTION, SOLUTION INTRAVENOUS at 07:06

## 2025-06-18 RX ADMIN — ACETAMINOPHEN 650 MG: 325 TABLET ORAL at 07:06

## 2025-06-18 RX ADMIN — SODIUM CHLORIDE 700 MG: 9 INJECTION, SOLUTION INTRAVENOUS at 08:06

## 2025-06-18 RX ADMIN — SODIUM CHLORIDE 25 MG: 9 INJECTION, SOLUTION INTRAVENOUS at 07:06

## 2025-06-23 ENCOUNTER — LAB VISIT (OUTPATIENT)
Dept: LAB | Facility: HOSPITAL | Age: 70
End: 2025-06-23
Payer: MEDICARE

## 2025-06-23 DIAGNOSIS — D68.311 ACQUIRED HEMOPHILIA A: ICD-10-CM

## 2025-06-23 LAB
ABSOLUTE EOSINOPHIL (SMH): 0.4 K/UL
ABSOLUTE MONOCYTE (SMH): 0.68 K/UL (ref 0.3–1)
ABSOLUTE NEUTROPHIL COUNT (SMH): 2.7 K/UL (ref 1.8–7.7)
ALBUMIN SERPL-MCNC: 4.4 G/DL (ref 3.5–5.2)
ALP SERPL-CCNC: 65 UNIT/L (ref 55–135)
ALT SERPL-CCNC: 19 UNIT/L (ref 10–44)
ANION GAP (SMH): 7 MMOL/L (ref 8–16)
AST SERPL-CCNC: 20 UNIT/L (ref 10–40)
BASOPHILS # BLD AUTO: 0.07 K/UL
BASOPHILS NFR BLD AUTO: 1.3 %
BILIRUB SERPL-MCNC: 0.7 MG/DL (ref 0.1–1)
BUN SERPL-MCNC: 9 MG/DL (ref 8–23)
CALCIUM SERPL-MCNC: 10 MG/DL (ref 8.7–10.5)
CHLORIDE SERPL-SCNC: 103 MMOL/L (ref 95–110)
CO2 SERPL-SCNC: 31 MMOL/L (ref 23–29)
CREAT SERPL-MCNC: 1.1 MG/DL (ref 0.5–1.4)
ERYTHROCYTE [DISTWIDTH] IN BLOOD BY AUTOMATED COUNT: 12.8 % (ref 11.5–14.5)
GFR SERPLBLD CREATININE-BSD FMLA CKD-EPI: >60 ML/MIN/1.73/M2
GLUCOSE SERPL-MCNC: 76 MG/DL (ref 70–110)
HCT VFR BLD AUTO: 42.8 % (ref 40–54)
HGB BLD-MCNC: 14.5 GM/DL (ref 14–18)
IMM GRANULOCYTES # BLD AUTO: 0.02 K/UL (ref 0–0.04)
IMM GRANULOCYTES NFR BLD AUTO: 0.4 % (ref 0–0.5)
LYMPHOCYTES # BLD AUTO: 1.37 K/UL (ref 1–4.8)
MCH RBC QN AUTO: 26.8 PG (ref 27–31)
MCHC RBC AUTO-ENTMCNC: 33.9 G/DL (ref 32–36)
MCV RBC AUTO: 79 FL (ref 82–98)
NUCLEATED RBC (/100WBC) (SMH): 0 /100 WBC
PLATELET # BLD AUTO: 194 K/UL (ref 150–450)
PMV BLD AUTO: 12.9 FL (ref 9.2–12.9)
POTASSIUM SERPL-SCNC: 4 MMOL/L (ref 3.5–5.1)
PROT SERPL-MCNC: 7.3 GM/DL (ref 6–8.4)
RBC # BLD AUTO: 5.41 M/UL (ref 4.6–6.2)
RELATIVE EOSINOPHIL (SMH): 7.7 % (ref 0–8)
RELATIVE LYMPHOCYTE (SMH): 26.4 % (ref 18–48)
RELATIVE MONOCYTE (SMH): 13.1 % (ref 4–15)
RELATIVE NEUTROPHIL (SMH): 51.1 % (ref 38–73)
SODIUM SERPL-SCNC: 141 MMOL/L (ref 136–145)
WBC # BLD AUTO: 5.19 K/UL (ref 3.9–12.7)

## 2025-06-23 PROCEDURE — 80053 COMPREHEN METABOLIC PANEL: CPT

## 2025-06-23 PROCEDURE — 85025 COMPLETE CBC W/AUTO DIFF WBC: CPT

## 2025-06-23 PROCEDURE — 36415 COLL VENOUS BLD VENIPUNCTURE: CPT

## 2025-06-24 ENCOUNTER — OFFICE VISIT (OUTPATIENT)
Dept: HEMATOLOGY/ONCOLOGY | Facility: CLINIC | Age: 70
End: 2025-06-24
Payer: MEDICARE

## 2025-06-24 VITALS
TEMPERATURE: 98 F | RESPIRATION RATE: 17 BRPM | WEIGHT: 174.38 LBS | HEART RATE: 77 BPM | BODY MASS INDEX: 24.97 KG/M2 | OXYGEN SATURATION: 98 % | SYSTOLIC BLOOD PRESSURE: 127 MMHG | HEIGHT: 70 IN | DIASTOLIC BLOOD PRESSURE: 78 MMHG

## 2025-06-24 DIAGNOSIS — D68.311 ACQUIRED HEMOPHILIA A: Primary | ICD-10-CM

## 2025-06-24 PROBLEM — D59.9 ACQUIRED HEMOLYTIC ANEMIA: Status: ACTIVE | Noted: 2025-06-24

## 2025-06-24 PROBLEM — D59.9 ACQUIRED HEMOLYTIC ANEMIA: Status: RESOLVED | Noted: 2025-06-24 | Resolved: 2025-06-24

## 2025-06-24 PROCEDURE — 1126F AMNT PAIN NOTED NONE PRSNT: CPT | Mod: CPTII,S$GLB,, | Performed by: INTERNAL MEDICINE

## 2025-06-24 PROCEDURE — 3078F DIAST BP <80 MM HG: CPT | Mod: CPTII,S$GLB,, | Performed by: INTERNAL MEDICINE

## 2025-06-24 PROCEDURE — 99999 PR PBB SHADOW E&M-EST. PATIENT-LVL III: CPT | Mod: PBBFAC,,, | Performed by: INTERNAL MEDICINE

## 2025-06-24 PROCEDURE — 3008F BODY MASS INDEX DOCD: CPT | Mod: CPTII,S$GLB,, | Performed by: INTERNAL MEDICINE

## 2025-06-24 PROCEDURE — 1101F PT FALLS ASSESS-DOCD LE1/YR: CPT | Mod: CPTII,S$GLB,, | Performed by: INTERNAL MEDICINE

## 2025-06-24 PROCEDURE — 3074F SYST BP LT 130 MM HG: CPT | Mod: CPTII,S$GLB,, | Performed by: INTERNAL MEDICINE

## 2025-06-24 PROCEDURE — 1159F MED LIST DOCD IN RCRD: CPT | Mod: CPTII,S$GLB,, | Performed by: INTERNAL MEDICINE

## 2025-06-24 PROCEDURE — 99215 OFFICE O/P EST HI 40 MIN: CPT | Mod: S$GLB,,, | Performed by: INTERNAL MEDICINE

## 2025-06-24 PROCEDURE — 3288F FALL RISK ASSESSMENT DOCD: CPT | Mod: CPTII,S$GLB,, | Performed by: INTERNAL MEDICINE

## 2025-06-24 NOTE — PROGRESS NOTES
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 male here with factor 8 inhibitor.  I was previously seeing him after he developed a spontaneous retroperitoneal hematoma about 2-3 years ago at which point they inhibitor was discovered.  He was treated with steroid taper in it improved, but once the inhibitor returned, I referred him to hemophilia Clinic in [place].  There he was recently treated with steroid taper back in January.  His inhibitor level returns again so he is now here on Rituxan.  He had 1/4 treatments last week.  He is here for toxicity check.  He tolerated mostly well.  Denies any complaints me.  Denies any bleeding.    Review of Systems   Constitutional: Negative.    HENT: Negative.     Eyes: Negative.    Respiratory: Negative.     Cardiovascular: Negative.    Gastrointestinal: Negative.    Endocrine: Negative.    Genitourinary: Negative.    Musculoskeletal: Negative.    Integumentary:  Negative.   Neurological: Negative.    Hematological: Negative.    Psychiatric/Behavioral: Negative.          Current Outpatient Medications   Medication Instructions    atorvastatin (LIPITOR) 10 mg, Daily    multivit-min-FA-lycopen-lutein (CENTRUM SILVER) 0.4 mg-300 mcg- 250 mcg Tab 1 tablet, Daily    ondansetron (ZOFRAN) 8 mg, Oral, Every 8 hours PRN    tadalafiL (CIALIS) 5 mg, Oral, Daily PRN        Past Medical History:   Diagnosis Date    Hyperlipidemia         Past Surgical History:   Procedure Laterality Date    CYSTOSCOPY N/A 12/27/2023    Procedure: CYSTOSCOPY;  Surgeon: Ricky Rousseau Jr., MD;  Location: [facility] OR;  Service: Urology;  Laterality: N/A;    INCISION AND DRAINAGE OF HEMATOMA Left 11/1/2023    Procedure: INCISION AND DRAINAGE, HEMATOMA;  Surgeon: Melba Farias MD;  Location: [facility] OR;  Service: General;  Laterality: Left;    TRANSURETHRAL RESECTION OF PROSTATE N/A 4/7/2022    Procedure: TURP (TRANSURETHRAL RESECTION OF  "PROSTATE);  Surgeon: Ricky Rousseau Jr., MD;  Location: [facility];  Service: Urology;  Laterality: N/A;        Family History   Problem Relation Name Age of Onset    Hypertension Brother x6     Cancer Brother x6         prostate       Social History     Tobacco Use    Smoking status: Never    Smokeless tobacco: Never   Substance Use Topics    Alcohol use: Yes     Comment: seldom    Drug use: No         Vitals:    06/24/25 1046   BP: 127/78   Pulse: 77   Resp: 17   Temp: 98 °F (36.7 °C)        Physical Exam:  /78 (BP Location: Right arm, Patient Position: Sitting)   Pulse 77   Temp 98 °F (36.7 °C) (Temporal)   Resp 17   Ht 5' 10" (1.778 m)   Wt 79.1 kg (174 lb 6.1 oz)   SpO2 98%   BMI 25.02 kg/m²     Physical Exam  Vitals and nursing note reviewed.   Constitutional:       Appearance: Normal appearance.   HENT:      Head: Normocephalic and atraumatic.      Nose: Nose normal.      Mouth/Throat:      Mouth: Mucous membranes are moist.      Pharynx: Oropharynx is clear.   Eyes:      Extraocular Movements: Extraocular movements intact.      Conjunctiva/sclera: Conjunctivae normal.   Cardiovascular:      Rate and Rhythm: Normal rate and regular rhythm.      Heart sounds: Normal heart sounds.   Pulmonary:      Effort: Pulmonary effort is normal.      Breath sounds: Normal breath sounds.   Abdominal:      General: Abdomen is flat. Bowel sounds are normal.      Palpations: Abdomen is soft.   Musculoskeletal:         General: Normal range of motion.      Cervical back: Normal range of motion and neck supple.      Comments: Right forearm with skin darkening possible mass like soft area underneath   Skin:     General: Skin is warm and dry.   Neurological:      General: No focal deficit present.      Mental Status: He is alert and oriented to person, place, and time. Mental status is at baseline.   Psychiatric:         Mood and Affect: Mood normal.          Labs:  Lab Results   Component Value Date    WBC 5.19 " 06/23/2025    RBC 5.41 06/23/2025    HGB 14.5 06/23/2025    HCT 42.8 06/23/2025    MCV 79 (L) 06/23/2025    MCH 26.8 (L) 06/23/2025    MCHC 33.9 06/23/2025    RDW 12.8 06/23/2025     06/23/2025    MPV 12.9 06/23/2025    GRAN 2.1 05/13/2024    GRAN 39.2 05/13/2024    LYMPH 32.2 06/16/2025    LYMPH 1.96 06/16/2025    MONO 10.9 06/16/2025    MONO 0.66 06/16/2025    EOS 5.8 06/16/2025    EOS 0.35 06/16/2025    BASO 0.07 05/13/2024    EOSINOPHIL 4.3 05/13/2024    BASOPHIL 1.0 06/16/2025    BASOPHIL 0.06 06/16/2025     Sodium   Date Value Ref Range Status   06/23/2025 141 136 - 145 mmol/L Final     Potassium   Date Value Ref Range Status   06/23/2025 4.0 3.5 - 5.1 mmol/L Final     Chloride   Date Value Ref Range Status   06/23/2025 103 95 - 110 mmol/L Final     CO2   Date Value Ref Range Status   06/23/2025 31 (H) 23 - 29 mmol/L Final     Glucose   Date Value Ref Range Status   06/23/2025 76 70 - 110 mg/dL Final     BUN   Date Value Ref Range Status   06/23/2025 9 8 - 23 mg/dL Final   06/16/2025 13 8 - 23 mg/dL Final     Creatinine   Date Value Ref Range Status   06/23/2025 1.1 0.5 - 1.4 mg/dL Final   06/16/2025 1.1 0.5 - 1.4 mg/dL Final     Calcium   Date Value Ref Range Status   06/23/2025 10.0 8.7 - 10.5 mg/dL Final     Protein Total   Date Value Ref Range Status   06/23/2025 7.3 6.0 - 8.4 gm/dL Final     Albumin   Date Value Ref Range Status   06/23/2025 4.4 3.5 - 5.2 g/dL Final     Bilirubin Total   Date Value Ref Range Status   06/23/2025 0.7 0.1 - 1.0 mg/dL Final     Comment:     For infants and newborns, interpretation of results should be based   on gestational age, weight and in agreement with clinical   observations.    Premature Infant recommended reference ranges:   0-24 hours:  <8.0 mg/dL   24-48 hours: <12.0 mg/dL   3-5 days:    <15.0 mg/dL   6-29 days:   <15.0 mg/dL     ALP   Date Value Ref Range Status   06/23/2025 65 55 - 135 unit/L Final     AST   Date Value Ref Range Status   06/23/2025 20 10 -  40 unit/L Final     ALT   Date Value Ref Range Status   06/23/2025 19 10 - 44 unit/L Final     Anion Gap   Date Value Ref Range Status   06/23/2025 7 (L) 8 - 16 mmol/L Final   06/16/2025 10 8 - 16 mmol/L Final     eGFR if    Date Value Ref Range Status   04/07/2022 >60 >60 mL/min/1.73 m^2 Final     eGFR if non    Date Value Ref Range Status   04/07/2022 >60 >60 mL/min/1.73 m^2 Final     Comment:     Calculation used to obtain the estimated glomerular filtration  rate (eGFR) is the CKD-EPI equation.          A/P:    Hematoma   Prolonged bleeding   -has factor VIII inhibitor  -I have seemed him previously now he returns with an increase in his inhibitor level again.  He is now on Rituxan.  Proceed with the rest of treatment.  -return to clinic in 4 weeks with lab Aurash Khoobehi, MD  Hematology and Oncology

## 2025-06-25 ENCOUNTER — INFUSION (OUTPATIENT)
Dept: INFUSION THERAPY | Facility: HOSPITAL | Age: 70
End: 2025-06-25
Attending: INTERNAL MEDICINE
Payer: MEDICARE

## 2025-06-25 VITALS
DIASTOLIC BLOOD PRESSURE: 82 MMHG | BODY MASS INDEX: 25.27 KG/M2 | TEMPERATURE: 97 F | WEIGHT: 176.5 LBS | OXYGEN SATURATION: 98 % | SYSTOLIC BLOOD PRESSURE: 152 MMHG | RESPIRATION RATE: 17 BRPM | HEIGHT: 70 IN | HEART RATE: 52 BPM

## 2025-06-25 DIAGNOSIS — D68.311 ACQUIRED HEMOPHILIA A: Primary | ICD-10-CM

## 2025-06-25 PROCEDURE — 96375 TX/PRO/DX INJ NEW DRUG ADDON: CPT

## 2025-06-25 PROCEDURE — 25000003 PHARM REV CODE 250: Performed by: INTERNAL MEDICINE

## 2025-06-25 PROCEDURE — 96413 CHEMO IV INFUSION 1 HR: CPT

## 2025-06-25 PROCEDURE — 63600175 PHARM REV CODE 636 W HCPCS: Mod: JZ,TB | Performed by: INTERNAL MEDICINE

## 2025-06-25 PROCEDURE — 96415 CHEMO IV INFUSION ADDL HR: CPT

## 2025-06-25 PROCEDURE — 96367 TX/PROPH/DG ADDL SEQ IV INF: CPT

## 2025-06-25 RX ORDER — HEPARIN 100 UNIT/ML
500 SYRINGE INTRAVENOUS
OUTPATIENT
Start: 2025-07-02

## 2025-06-25 RX ORDER — EPINEPHRINE 0.3 MG/.3ML
0.3 INJECTION SUBCUTANEOUS ONCE AS NEEDED
OUTPATIENT
Start: 2025-07-02

## 2025-06-25 RX ORDER — ACETAMINOPHEN 325 MG/1
650 TABLET ORAL
OUTPATIENT
Start: 2025-07-02

## 2025-06-25 RX ORDER — FAMOTIDINE 10 MG/ML
20 INJECTION, SOLUTION INTRAVENOUS
Status: COMPLETED | OUTPATIENT
Start: 2025-06-25 | End: 2025-06-25

## 2025-06-25 RX ORDER — SODIUM CHLORIDE 0.9 % (FLUSH) 0.9 %
10 SYRINGE (ML) INJECTION
Status: DISCONTINUED | OUTPATIENT
Start: 2025-06-25 | End: 2025-06-25 | Stop reason: HOSPADM

## 2025-06-25 RX ORDER — MEPERIDINE HYDROCHLORIDE 50 MG/ML
25 INJECTION INTRAMUSCULAR; INTRAVENOUS; SUBCUTANEOUS
OUTPATIENT
Start: 2025-07-02

## 2025-06-25 RX ORDER — ACETAMINOPHEN 325 MG/1
650 TABLET ORAL
Status: COMPLETED | OUTPATIENT
Start: 2025-06-25 | End: 2025-06-25

## 2025-06-25 RX ORDER — SODIUM CHLORIDE 0.9 % (FLUSH) 0.9 %
10 SYRINGE (ML) INJECTION
OUTPATIENT
Start: 2025-07-02

## 2025-06-25 RX ORDER — FAMOTIDINE 10 MG/ML
20 INJECTION, SOLUTION INTRAVENOUS
OUTPATIENT
Start: 2025-07-02

## 2025-06-25 RX ORDER — DIPHENHYDRAMINE HYDROCHLORIDE 50 MG/ML
50 INJECTION, SOLUTION INTRAMUSCULAR; INTRAVENOUS ONCE AS NEEDED
OUTPATIENT
Start: 2025-07-02

## 2025-06-25 RX ADMIN — SODIUM CHLORIDE 700 MG: 9 INJECTION, SOLUTION INTRAVENOUS at 08:06

## 2025-06-25 RX ADMIN — ACETAMINOPHEN 650 MG: 325 TABLET ORAL at 07:06

## 2025-06-25 RX ADMIN — FAMOTIDINE 20 MG: 10 INJECTION INTRAVENOUS at 07:06

## 2025-06-25 RX ADMIN — SODIUM CHLORIDE: 9 INJECTION, SOLUTION INTRAVENOUS at 07:06

## 2025-06-25 RX ADMIN — SODIUM CHLORIDE 25 MG: 9 INJECTION, SOLUTION INTRAVENOUS at 07:06

## 2025-06-25 NOTE — PLAN OF CARE
Problem: Fatigue  Goal: Improved Activity Tolerance  Outcome: Progressing  Intervention: Promote Improved Energy  Flowsheets (Taken 6/25/2025 4619)  Fatigue Management: frequent rest breaks encouraged  Sleep/Rest Enhancement: regular sleep/rest pattern promoted  Activity Management: Ambulated -L4  Environmental Support: calm environment promoted

## 2025-07-02 ENCOUNTER — INFUSION (OUTPATIENT)
Dept: INFUSION THERAPY | Facility: HOSPITAL | Age: 70
End: 2025-07-02
Attending: INTERNAL MEDICINE
Payer: MEDICARE

## 2025-07-02 VITALS
BODY MASS INDEX: 25.17 KG/M2 | DIASTOLIC BLOOD PRESSURE: 78 MMHG | OXYGEN SATURATION: 98 % | HEIGHT: 70 IN | WEIGHT: 175.81 LBS | SYSTOLIC BLOOD PRESSURE: 160 MMHG | TEMPERATURE: 97 F | RESPIRATION RATE: 16 BRPM | HEART RATE: 50 BPM

## 2025-07-02 DIAGNOSIS — D68.311 ACQUIRED HEMOPHILIA A: Primary | ICD-10-CM

## 2025-07-02 PROCEDURE — A4216 STERILE WATER/SALINE, 10 ML: HCPCS | Performed by: INTERNAL MEDICINE

## 2025-07-02 PROCEDURE — 96415 CHEMO IV INFUSION ADDL HR: CPT

## 2025-07-02 PROCEDURE — 96367 TX/PROPH/DG ADDL SEQ IV INF: CPT

## 2025-07-02 PROCEDURE — 96375 TX/PRO/DX INJ NEW DRUG ADDON: CPT

## 2025-07-02 PROCEDURE — 63600175 PHARM REV CODE 636 W HCPCS: Performed by: INTERNAL MEDICINE

## 2025-07-02 PROCEDURE — 96413 CHEMO IV INFUSION 1 HR: CPT

## 2025-07-02 PROCEDURE — 25000003 PHARM REV CODE 250: Performed by: INTERNAL MEDICINE

## 2025-07-02 RX ORDER — SODIUM CHLORIDE 0.9 % (FLUSH) 0.9 %
10 SYRINGE (ML) INJECTION
Status: DISCONTINUED | OUTPATIENT
Start: 2025-07-02 | End: 2025-07-02 | Stop reason: HOSPADM

## 2025-07-02 RX ORDER — HEPARIN 100 UNIT/ML
500 SYRINGE INTRAVENOUS
OUTPATIENT
Start: 2025-07-09

## 2025-07-02 RX ORDER — ACETAMINOPHEN 325 MG/1
650 TABLET ORAL
Status: COMPLETED | OUTPATIENT
Start: 2025-07-02 | End: 2025-07-02

## 2025-07-02 RX ORDER — DIPHENHYDRAMINE HYDROCHLORIDE 50 MG/ML
50 INJECTION, SOLUTION INTRAMUSCULAR; INTRAVENOUS ONCE AS NEEDED
OUTPATIENT
Start: 2025-07-09

## 2025-07-02 RX ORDER — MEPERIDINE HYDROCHLORIDE 50 MG/ML
25 INJECTION INTRAMUSCULAR; INTRAVENOUS; SUBCUTANEOUS
OUTPATIENT
Start: 2025-07-09

## 2025-07-02 RX ORDER — ACETAMINOPHEN 325 MG/1
650 TABLET ORAL
OUTPATIENT
Start: 2025-07-09

## 2025-07-02 RX ORDER — MEPERIDINE HYDROCHLORIDE 25 MG/ML
25 INJECTION INTRAMUSCULAR; INTRAVENOUS; SUBCUTANEOUS
Status: DISCONTINUED | OUTPATIENT
Start: 2025-07-02 | End: 2025-07-02 | Stop reason: HOSPADM

## 2025-07-02 RX ORDER — EPINEPHRINE 0.3 MG/.3ML
0.3 INJECTION SUBCUTANEOUS ONCE AS NEEDED
OUTPATIENT
Start: 2025-07-09

## 2025-07-02 RX ORDER — FAMOTIDINE 10 MG/ML
20 INJECTION, SOLUTION INTRAVENOUS
OUTPATIENT
Start: 2025-07-09

## 2025-07-02 RX ORDER — FAMOTIDINE 10 MG/ML
20 INJECTION, SOLUTION INTRAVENOUS
Status: COMPLETED | OUTPATIENT
Start: 2025-07-02 | End: 2025-07-02

## 2025-07-02 RX ORDER — SODIUM CHLORIDE 0.9 % (FLUSH) 0.9 %
10 SYRINGE (ML) INJECTION
OUTPATIENT
Start: 2025-07-09

## 2025-07-02 RX ADMIN — DIPHENHYDRAMINE HYDROCHLORIDE 25 MG: 50 INJECTION, SOLUTION INTRAMUSCULAR; INTRAVENOUS at 07:07

## 2025-07-02 RX ADMIN — ACETAMINOPHEN 650 MG: 325 TABLET ORAL at 07:07

## 2025-07-02 RX ADMIN — SODIUM CHLORIDE: 9 INJECTION, SOLUTION INTRAVENOUS at 07:07

## 2025-07-02 RX ADMIN — FAMOTIDINE 20 MG: 10 INJECTION INTRAVENOUS at 07:07

## 2025-07-02 RX ADMIN — SODIUM CHLORIDE 700 MG: 9 INJECTION, SOLUTION INTRAVENOUS at 08:07

## 2025-07-02 RX ADMIN — SODIUM CHLORIDE, PRESERVATIVE FREE 10 ML: 5 INJECTION INTRAVENOUS at 10:07

## 2025-07-02 NOTE — PLAN OF CARE
Problem: Fall Injury Risk  Goal: Absence of Fall and Fall-Related Injury  Outcome: Progressing  Intervention: Identify and Manage Contributors  Flowsheets (Taken 7/2/2025 0750)  Self-Care Promotion:   independence encouraged   BADL personal objects within reach   adaptive equipment use encouraged  Medication Review/Management: medications reviewed  Intervention: Promote Injury-Free Environment  Flowsheets (Taken 7/2/2025 0750)  Safety Promotion/Fall Prevention: medications reviewed

## 2025-07-09 ENCOUNTER — INFUSION (OUTPATIENT)
Dept: INFUSION THERAPY | Facility: HOSPITAL | Age: 70
End: 2025-07-09
Attending: INTERNAL MEDICINE
Payer: MEDICARE

## 2025-07-09 VITALS
DIASTOLIC BLOOD PRESSURE: 75 MMHG | TEMPERATURE: 98 F | HEART RATE: 55 BPM | WEIGHT: 176.13 LBS | OXYGEN SATURATION: 99 % | BODY MASS INDEX: 25.21 KG/M2 | RESPIRATION RATE: 16 BRPM | SYSTOLIC BLOOD PRESSURE: 137 MMHG | HEIGHT: 70 IN

## 2025-07-09 DIAGNOSIS — D68.311 ACQUIRED HEMOPHILIA A: Primary | ICD-10-CM

## 2025-07-09 PROCEDURE — 25000003 PHARM REV CODE 250: Performed by: INTERNAL MEDICINE

## 2025-07-09 PROCEDURE — 96413 CHEMO IV INFUSION 1 HR: CPT

## 2025-07-09 PROCEDURE — 96367 TX/PROPH/DG ADDL SEQ IV INF: CPT

## 2025-07-09 PROCEDURE — 63600175 PHARM REV CODE 636 W HCPCS: Performed by: INTERNAL MEDICINE

## 2025-07-09 PROCEDURE — 96415 CHEMO IV INFUSION ADDL HR: CPT

## 2025-07-09 PROCEDURE — 96375 TX/PRO/DX INJ NEW DRUG ADDON: CPT

## 2025-07-09 RX ORDER — SODIUM CHLORIDE 0.9 % (FLUSH) 0.9 %
10 SYRINGE (ML) INJECTION
Status: CANCELLED | OUTPATIENT
Start: 2025-07-09

## 2025-07-09 RX ORDER — SODIUM CHLORIDE 0.9 % (FLUSH) 0.9 %
10 SYRINGE (ML) INJECTION
Status: DISCONTINUED | OUTPATIENT
Start: 2025-07-09 | End: 2025-07-09 | Stop reason: HOSPADM

## 2025-07-09 RX ORDER — ACETAMINOPHEN 325 MG/1
650 TABLET ORAL
OUTPATIENT
Start: 2025-07-09

## 2025-07-09 RX ORDER — FAMOTIDINE 10 MG/ML
20 INJECTION, SOLUTION INTRAVENOUS
OUTPATIENT
Start: 2025-07-09

## 2025-07-09 RX ORDER — HEPARIN 100 UNIT/ML
500 SYRINGE INTRAVENOUS
OUTPATIENT
Start: 2025-07-09

## 2025-07-09 RX ORDER — ACETAMINOPHEN 325 MG/1
650 TABLET ORAL
Status: COMPLETED | OUTPATIENT
Start: 2025-07-09 | End: 2025-07-09

## 2025-07-09 RX ORDER — FAMOTIDINE 10 MG/ML
20 INJECTION, SOLUTION INTRAVENOUS
Status: COMPLETED | OUTPATIENT
Start: 2025-07-09 | End: 2025-07-09

## 2025-07-09 RX ORDER — DIPHENHYDRAMINE HYDROCHLORIDE 50 MG/ML
50 INJECTION, SOLUTION INTRAMUSCULAR; INTRAVENOUS ONCE AS NEEDED
OUTPATIENT
Start: 2025-07-09

## 2025-07-09 RX ORDER — EPINEPHRINE 0.3 MG/.3ML
0.3 INJECTION SUBCUTANEOUS ONCE AS NEEDED
OUTPATIENT
Start: 2025-07-09

## 2025-07-09 RX ORDER — MEPERIDINE HYDROCHLORIDE 50 MG/ML
25 INJECTION INTRAMUSCULAR; INTRAVENOUS; SUBCUTANEOUS
OUTPATIENT
Start: 2025-07-09

## 2025-07-09 RX ADMIN — SODIUM CHLORIDE: 9 INJECTION, SOLUTION INTRAVENOUS at 07:07

## 2025-07-09 RX ADMIN — SODIUM CHLORIDE 25 MG: 9 INJECTION, SOLUTION INTRAVENOUS at 07:07

## 2025-07-09 RX ADMIN — SODIUM CHLORIDE 700 MG: 9 INJECTION, SOLUTION INTRAVENOUS at 07:07

## 2025-07-09 RX ADMIN — ACETAMINOPHEN 650 MG: 325 TABLET ORAL at 07:07

## 2025-07-09 RX ADMIN — FAMOTIDINE 20 MG: 10 INJECTION INTRAVENOUS at 07:07

## 2025-07-09 NOTE — PLAN OF CARE
Problem: Fall Injury Risk  Goal: Absence of Fall and Fall-Related Injury  Outcome: Progressing  Intervention: Identify and Manage Contributors  Flowsheets (Taken 7/9/2025 0706)  Self-Care Promotion: independence encouraged  Medication Review/Management: medications reviewed  Intervention: Promote Injury-Free Environment  Flowsheets (Taken 7/9/2025 0706)  Safety Promotion/Fall Prevention: medications reviewed

## 2025-07-14 ENCOUNTER — LAB VISIT (OUTPATIENT)
Dept: LAB | Facility: HOSPITAL | Age: 70
End: 2025-07-14
Payer: MEDICARE

## 2025-07-14 DIAGNOSIS — D68.311 ACQUIRED HEMOPHILIA A: ICD-10-CM

## 2025-07-14 LAB
ABSOLUTE EOSINOPHIL (SMH): 0.26 K/UL
ABSOLUTE MONOCYTE (SMH): 0.64 K/UL (ref 0.3–1)
ABSOLUTE NEUTROPHIL COUNT (SMH): 4 K/UL (ref 1.8–7.7)
ALBUMIN SERPL-MCNC: 4.4 G/DL (ref 3.5–5.2)
ALP SERPL-CCNC: 63 UNIT/L (ref 55–135)
ALT SERPL-CCNC: 20 UNIT/L (ref 10–44)
ANION GAP (SMH): 8 MMOL/L (ref 8–16)
AST SERPL-CCNC: 23 UNIT/L (ref 10–40)
BASOPHILS # BLD AUTO: 0.06 K/UL
BASOPHILS NFR BLD AUTO: 0.9 %
BILIRUB SERPL-MCNC: 0.9 MG/DL (ref 0.1–1)
BUN SERPL-MCNC: 10 MG/DL (ref 8–23)
CALCIUM SERPL-MCNC: 10.1 MG/DL (ref 8.7–10.5)
CHLORIDE SERPL-SCNC: 104 MMOL/L (ref 95–110)
CO2 SERPL-SCNC: 28 MMOL/L (ref 23–29)
CREAT SERPL-MCNC: 1.1 MG/DL (ref 0.5–1.4)
ERYTHROCYTE [DISTWIDTH] IN BLOOD BY AUTOMATED COUNT: 12.9 % (ref 11.5–14.5)
GFR SERPLBLD CREATININE-BSD FMLA CKD-EPI: >60 ML/MIN/1.73/M2
GLUCOSE SERPL-MCNC: 106 MG/DL (ref 70–110)
HCT VFR BLD AUTO: 41.8 % (ref 40–54)
HGB BLD-MCNC: 14.2 GM/DL (ref 14–18)
IMM GRANULOCYTES # BLD AUTO: 0.03 K/UL (ref 0–0.04)
IMM GRANULOCYTES NFR BLD AUTO: 0.5 % (ref 0–0.5)
LYMPHOCYTES # BLD AUTO: 1.57 K/UL (ref 1–4.8)
MCH RBC QN AUTO: 26.7 PG (ref 27–31)
MCHC RBC AUTO-ENTMCNC: 34 G/DL (ref 32–36)
MCV RBC AUTO: 79 FL (ref 82–98)
NUCLEATED RBC (/100WBC) (SMH): 0 /100 WBC
PLATELET # BLD AUTO: 198 K/UL (ref 150–450)
PMV BLD AUTO: 12.3 FL (ref 9.2–12.9)
POTASSIUM SERPL-SCNC: 4 MMOL/L (ref 3.5–5.1)
PROT SERPL-MCNC: 7.4 GM/DL (ref 6–8.4)
RBC # BLD AUTO: 5.31 M/UL (ref 4.6–6.2)
RELATIVE EOSINOPHIL (SMH): 4 % (ref 0–8)
RELATIVE LYMPHOCYTE (SMH): 23.9 % (ref 18–48)
RELATIVE MONOCYTE (SMH): 9.8 % (ref 4–15)
RELATIVE NEUTROPHIL (SMH): 60.9 % (ref 38–73)
SODIUM SERPL-SCNC: 140 MMOL/L (ref 136–145)
WBC # BLD AUTO: 6.56 K/UL (ref 3.9–12.7)

## 2025-07-14 PROCEDURE — 84075 ASSAY ALKALINE PHOSPHATASE: CPT

## 2025-07-14 PROCEDURE — 36415 COLL VENOUS BLD VENIPUNCTURE: CPT

## 2025-07-14 PROCEDURE — 85240 CLOT FACTOR VIII AHG 1 STAGE: CPT

## 2025-07-14 PROCEDURE — 85025 COMPLETE CBC W/AUTO DIFF WBC: CPT

## 2025-07-18 LAB — FACT VIII ACT/NOR PPP: 3 % (ref 56–140)

## 2025-07-22 ENCOUNTER — OFFICE VISIT (OUTPATIENT)
Dept: HEMATOLOGY/ONCOLOGY | Facility: CLINIC | Age: 70
End: 2025-07-22
Payer: MEDICARE

## 2025-07-22 VITALS
RESPIRATION RATE: 18 BRPM | OXYGEN SATURATION: 98 % | SYSTOLIC BLOOD PRESSURE: 115 MMHG | WEIGHT: 174.63 LBS | BODY MASS INDEX: 25 KG/M2 | HEART RATE: 65 BPM | TEMPERATURE: 98 F | HEIGHT: 70 IN | DIASTOLIC BLOOD PRESSURE: 68 MMHG

## 2025-07-22 DIAGNOSIS — D68.311 ACQUIRED HEMOPHILIA A: Primary | ICD-10-CM

## 2025-07-22 PROCEDURE — 3288F FALL RISK ASSESSMENT DOCD: CPT | Mod: CPTII,S$GLB,, | Performed by: INTERNAL MEDICINE

## 2025-07-22 PROCEDURE — 1126F AMNT PAIN NOTED NONE PRSNT: CPT | Mod: CPTII,S$GLB,, | Performed by: INTERNAL MEDICINE

## 2025-07-22 PROCEDURE — G2211 COMPLEX E/M VISIT ADD ON: HCPCS | Mod: S$GLB,,, | Performed by: INTERNAL MEDICINE

## 2025-07-22 PROCEDURE — 3074F SYST BP LT 130 MM HG: CPT | Mod: CPTII,S$GLB,, | Performed by: INTERNAL MEDICINE

## 2025-07-22 PROCEDURE — 99214 OFFICE O/P EST MOD 30 MIN: CPT | Mod: S$GLB,,, | Performed by: INTERNAL MEDICINE

## 2025-07-22 PROCEDURE — 3078F DIAST BP <80 MM HG: CPT | Mod: CPTII,S$GLB,, | Performed by: INTERNAL MEDICINE

## 2025-07-22 PROCEDURE — 1101F PT FALLS ASSESS-DOCD LE1/YR: CPT | Mod: CPTII,S$GLB,, | Performed by: INTERNAL MEDICINE

## 2025-07-22 PROCEDURE — 3008F BODY MASS INDEX DOCD: CPT | Mod: CPTII,S$GLB,, | Performed by: INTERNAL MEDICINE

## 2025-07-22 PROCEDURE — 1159F MED LIST DOCD IN RCRD: CPT | Mod: CPTII,S$GLB,, | Performed by: INTERNAL MEDICINE

## 2025-07-22 PROCEDURE — 99999 PR PBB SHADOW E&M-EST. PATIENT-LVL III: CPT | Mod: PBBFAC,,, | Performed by: INTERNAL MEDICINE

## 2025-07-22 RX ORDER — CYCLOPHOSPHAMIDE 50 MG/1
100 TABLET ORAL DAILY
Qty: 60 TABLET | Refills: 1 | Status: SHIPPED | OUTPATIENT
Start: 2025-07-22 | End: 2025-07-23 | Stop reason: SDUPTHER

## 2025-07-22 NOTE — Clinical Note
Rtc in 4 weeks with labs (assay and inhibitor, make sure inhibitor is drawn). See Karen, I won't be here.

## 2025-07-22 NOTE — PROGRESS NOTES
Service Date:  7/22/25    Chief Complaint: Acquired hemophilia A (quired Hemophilia A/labs/4wkfu/rituxan completed)    Jos Espino is a 69 y.o. male here with factor 8 inhibitor.  Recently completed Rituxan.  Assay is still low.  Denies any recent bleeding episode.    Review of Systems   Constitutional: Negative.    HENT: Negative.     Eyes: Negative.    Respiratory: Negative.     Cardiovascular: Negative.    Gastrointestinal: Negative.    Endocrine: Negative.    Genitourinary: Negative.    Musculoskeletal: Negative.    Integumentary:  Negative.   Neurological: Negative.    Hematological: Negative.    Psychiatric/Behavioral: Negative.          Current Outpatient Medications   Medication Instructions    atorvastatin (LIPITOR) 10 mg, Daily    cycloPHOSphamide (CYTOXAN) 100 mg, Oral, Daily    multivit-min-FA-lycopen-lutein (CENTRUM SILVER) 0.4 mg-300 mcg- 250 mcg Tab 1 tablet, Daily    ondansetron (ZOFRAN) 8 mg, Oral, Every 8 hours PRN    tadalafiL (CIALIS) 5 mg, Oral, Daily PRN        Past Medical History:   Diagnosis Date    Hyperlipidemia         Past Surgical History:   Procedure Laterality Date    CYSTOSCOPY N/A 12/27/2023    Procedure: CYSTOSCOPY;  Surgeon: Ricky Rousseau Jr., MD;  Location: [facility] OR;  Service: Urology;  Laterality: N/A;    INCISION AND DRAINAGE OF HEMATOMA Left 11/1/2023    Procedure: INCISION AND DRAINAGE, HEMATOMA;  Surgeon: Melba Farias MD;  Location: [facility] OR;  Service: General;  Laterality: Left;    TRANSURETHRAL RESECTION OF PROSTATE N/A 4/7/2022    Procedure: TURP (TRANSURETHRAL RESECTION OF PROSTATE);  Surgeon: Ricky Rousseau Jr., MD;  Location: [facility] OR;  Service: Urology;  Laterality: N/A;        Family History   Problem Relation Name Age of Onset    Hypertension Brother x6     Cancer Brother x6         prostate       Social History     Tobacco Use    Smoking status: Never    Smokeless tobacco: Never   Substance Use Topics    Alcohol use: Yes     Comment: seldom    Drug use:  "No         Vitals:    07/22/25 1003   BP: 115/68   Pulse: 65   Resp: 18   Temp: 97.5 °F (36.4 °C)        Physical Exam:  /68 (BP Location: Right arm, Patient Position: Sitting)   Pulse 65   Temp 97.5 °F (36.4 °C) (Temporal)   Resp 18   Ht 5' 10" (1.778 m)   Wt 79.2 kg (174 lb 9.7 oz)   SpO2 98%   BMI 25.05 kg/m²     Physical Exam  Vitals and nursing note reviewed.   Constitutional:       Appearance: Normal appearance.   HENT:      Head: Normocephalic and atraumatic.      Nose: Nose normal.      Mouth/Throat:      Mouth: Mucous membranes are moist.      Pharynx: Oropharynx is clear.   Eyes:      Extraocular Movements: Extraocular movements intact.      Conjunctiva/sclera: Conjunctivae normal.   Cardiovascular:      Rate and Rhythm: Normal rate and regular rhythm.      Heart sounds: Normal heart sounds.   Pulmonary:      Effort: Pulmonary effort is normal.      Breath sounds: Normal breath sounds.   Abdominal:      General: Abdomen is flat. Bowel sounds are normal.      Palpations: Abdomen is soft.   Musculoskeletal:         General: Normal range of motion.      Cervical back: Normal range of motion and neck supple.      Comments: Right forearm with skin darkening possible mass like soft area underneath   Skin:     General: Skin is warm and dry.   Neurological:      General: No focal deficit present.      Mental Status: He is alert and oriented to person, place, and time. Mental status is at baseline.   Psychiatric:         Mood and Affect: Mood normal.          Labs:  Lab Results   Component Value Date    WBC 6.56 07/14/2025    RBC 5.31 07/14/2025    HGB 14.2 07/14/2025    HCT 41.8 07/14/2025    MCV 79 (L) 07/14/2025    MCH 26.7 (L) 07/14/2025    MCHC 34.0 07/14/2025    RDW 12.9 07/14/2025     07/14/2025    MPV 12.3 07/14/2025    GRAN 2.1 05/13/2024    GRAN 39.2 05/13/2024    LYMPH 32.2 06/16/2025    LYMPH 1.96 06/16/2025    MONO 10.9 06/16/2025    MONO 0.66 06/16/2025    EOS 5.8 06/16/2025    EOS " 0.35 06/16/2025    BASO 0.07 05/13/2024    EOSINOPHIL 4.3 05/13/2024    BASOPHIL 1.0 06/16/2025    BASOPHIL 0.06 06/16/2025     Sodium   Date Value Ref Range Status   07/14/2025 140 136 - 145 mmol/L Final     Potassium   Date Value Ref Range Status   07/14/2025 4.0 3.5 - 5.1 mmol/L Final     Chloride   Date Value Ref Range Status   07/14/2025 104 95 - 110 mmol/L Final     CO2   Date Value Ref Range Status   07/14/2025 28 23 - 29 mmol/L Final     Glucose   Date Value Ref Range Status   07/14/2025 106 70 - 110 mg/dL Final     BUN   Date Value Ref Range Status   07/14/2025 10 8 - 23 mg/dL Final   06/16/2025 13 8 - 23 mg/dL Final     Creatinine   Date Value Ref Range Status   07/14/2025 1.1 0.5 - 1.4 mg/dL Final   06/16/2025 1.1 0.5 - 1.4 mg/dL Final     Calcium   Date Value Ref Range Status   07/14/2025 10.1 8.7 - 10.5 mg/dL Final     Protein Total   Date Value Ref Range Status   07/14/2025 7.4 6.0 - 8.4 gm/dL Final     Albumin   Date Value Ref Range Status   07/14/2025 4.4 3.5 - 5.2 g/dL Final     Bilirubin Total   Date Value Ref Range Status   07/14/2025 0.9 0.1 - 1.0 mg/dL Final     Comment:     For infants and newborns, interpretation of results should be based   on gestational age, weight and in agreement with clinical   observations.    Premature Infant recommended reference ranges:   0-24 hours:  <8.0 mg/dL   24-48 hours: <12.0 mg/dL   3-5 days:    <15.0 mg/dL   6-29 days:   <15.0 mg/dL     ALP   Date Value Ref Range Status   07/14/2025 63 55 - 135 unit/L Final     AST   Date Value Ref Range Status   07/14/2025 23 10 - 40 unit/L Final     ALT   Date Value Ref Range Status   07/14/2025 20 10 - 44 unit/L Final     Anion Gap   Date Value Ref Range Status   07/14/2025 8 8 - 16 mmol/L Final   06/16/2025 10 8 - 16 mmol/L Final     eGFR if    Date Value Ref Range Status   04/07/2022 >60 >60 mL/min/1.73 m^2 Final     eGFR if non    Date Value Ref Range Status   04/07/2022 >60 >60  mL/min/1.73 m^2 Final     Comment:     Calculation used to obtain the estimated glomerular filtration  rate (eGFR) is the CKD-EPI equation.          A/P:    Hematoma   Prolonged bleeding   -has factor VIII inhibitor  -Rituxan has not yet worked, but needs about 6-8 weeks to work.  I spoke to patient's hemophilia physician.  We decided to start him on Cytoxan as a bridge for the Rituxan to work.  I will do this for 4 weeks and repeat blood work in 4 weeks.      Aurash Khoobehi, MD  Hematology and Oncology

## 2025-07-23 DIAGNOSIS — D68.311 ACQUIRED HEMOPHILIA A: ICD-10-CM

## 2025-07-23 DIAGNOSIS — D68.311 ACQUIRED HEMOPHILIA A: Primary | ICD-10-CM

## 2025-07-23 RX ORDER — CYCLOPHOSPHAMIDE 50 MG/1
100 TABLET ORAL DAILY
Qty: 60 TABLET | Refills: 1 | Status: SHIPPED | OUTPATIENT
Start: 2025-07-23 | End: 2025-07-23 | Stop reason: SDUPTHER

## 2025-07-23 RX ORDER — CYCLOPHOSPHAMIDE 50 MG/1
100 TABLET ORAL DAILY
Qty: 60 TABLET | Refills: 1 | Status: SHIPPED | OUTPATIENT
Start: 2025-07-23 | End: 2025-07-25 | Stop reason: CLARIF

## 2025-07-23 NOTE — TELEPHONE ENCOUNTER
Copied from CRM #[id number]. Topic: Medications - Medication Refill  >> Jul 23, 2025 12:59 PM Maye wrote:  Type:  RX Refill Request    Who Called:  pt wife  Refill or New Rx: refill  RX Name and Strength: cycloPHOSphamide (CYTOXAN) 50 MG tablet    How is the patient currently taking it? (ex. 1XDay): 2 1 x day  Is this a 30 day or 90 day RX: 60  Preferred Pharmacy with phone number:   [facility] [address]  [address] [place] [address]  Phone: [phone] Fax: [phone]    [facility] [place] - [address]  [address] [facility] [place] [address]  Phone: [phone] Fax: [phone]      Local or Mail Order: local  Ordering Provider: Khoobehi, Aurash,  Would the patient rather a call back or a response via [facility]? call  Best Call Back Number: Telephone Information:  Mobile          [phone]        Additional Information:  pt wife states that the script was sent to the wrong pharmacy and it needs to be sent to [place]

## 2025-07-25 RX ORDER — CYCLOPHOSPHAMIDE 50 MG/1
100 CAPSULE ORAL DAILY
Qty: 60 CAPSULE | Refills: 1 | Status: ACTIVE | OUTPATIENT
Start: 2025-07-25

## 2025-08-19 ENCOUNTER — TELEPHONE (OUTPATIENT)
Dept: HEMATOLOGY/ONCOLOGY | Facility: CLINIC | Age: 70
End: 2025-08-19
Payer: MEDICARE

## (undated) DEVICE — SUCTION YANKAUER 34970

## (undated) DEVICE — SUTURE PROLENE 2-0 SH 36 8523H

## (undated) DEVICE — SOL IRR NACL .9% 3000ML

## (undated) DEVICE — DERMABOND HVD MINI  DHVM12

## (undated) DEVICE — BASIN BLUE 32OZ 1232

## (undated) DEVICE — GLOVE BIOGELPI GOLD SIZE 7.5

## (undated) DEVICE — SUTURE VICRYL 4-0 SH 27 J415H

## (undated) DEVICE — NEEDLE SAFETY ECLIPSE 25G 1-1/2IN 305767

## (undated) DEVICE — SYR 50ML CATH TIP

## (undated) DEVICE — TRAY GENERAL SURGERY

## (undated) DEVICE — SOLUTION IRRI H2O BOTTLE 1000ML

## (undated) DEVICE — NEEDLE SAFETY ECLIPSE 18G 1-1/2

## (undated) DEVICE — SYRINGE 30ML 302832

## (undated) DEVICE — SOLUTION IRRI NS BOTTLE 1000ML R5200-01

## (undated) DEVICE — SUTURE ETHILON 2-0 PS 18 585H

## (undated) DEVICE — TUBING ARTHRO IRR 4-LEAD

## (undated) DEVICE — SLEEVE SCD EXPRESS KNEE MEDIUM

## (undated) DEVICE — SPONGE DRAIN 4X4 441407

## (undated) DEVICE — Device

## (undated) DEVICE — SCRUB DYNA-HEX LIQ 4% CHG 4OZ

## (undated) DEVICE — BOWL STERILE LARGE 32OZ

## (undated) DEVICE — ELECTRODE RESECTION BUTTON LRG

## (undated) DEVICE — SYS EASY CATCHER FLUID DRN

## (undated) DEVICE — GLOVE SURG ULTRA TOUCH 7.5

## (undated) DEVICE — SYR 30CC LUER LOCK

## (undated) DEVICE — GOWN B1 X-LG X-LONG

## (undated) DEVICE — SUTURE SILK 2-0 18 A185H

## (undated) DEVICE — SYR 10CC LUER LOCK

## (undated) DEVICE — BULB DRAIN 100CC 70740

## (undated) DEVICE — GLOVE SURG ULTRA TOUCH 6

## (undated) DEVICE — DRAIN 19FR TROCAR  072231

## (undated) DEVICE — BAG URINARY DRN 2000ML

## (undated) DEVICE — PAD BOVIE ADULT

## (undated) DEVICE — SUTURE VICRYL 2-0 27 SH VCP417H

## (undated) DEVICE — TUBE FRAZIER 5MM 2FT SOFT TIP

## (undated) DEVICE — SPONGE BULKEE II ABSRB 6X6.75

## (undated) DEVICE — SPONGE LAP 18X18

## (undated) DEVICE — SUTURE PDS 1 TP-1 48 PDP880G

## (undated) DEVICE — SEE MEDLINE ITEM 157185

## (undated) DEVICE — ELECTRODE RESECTION LOOP LRGE

## (undated) DEVICE — SPONGE PEANUT 3/8 7103

## (undated) DEVICE — SYRINGE BULB ASEPTO 60CC 2OZ

## (undated) DEVICE — SUTURE MONOCRYL 4-0 27 SH MCP415H

## (undated) DEVICE — SUTURE VICRYL 2-0 SH 18 VCP775D

## (undated) DEVICE — SEE MEDLINE ITEM 157116

## (undated) DEVICE — SOL 9P NACL IRR PIC IL